# Patient Record
Sex: FEMALE | Race: BLACK OR AFRICAN AMERICAN | NOT HISPANIC OR LATINO | Employment: FULL TIME | URBAN - METROPOLITAN AREA
[De-identification: names, ages, dates, MRNs, and addresses within clinical notes are randomized per-mention and may not be internally consistent; named-entity substitution may affect disease eponyms.]

---

## 2024-03-02 ENCOUNTER — HOSPITAL ENCOUNTER (EMERGENCY)
Facility: HOSPITAL | Age: 39
Discharge: HOME | End: 2024-03-02
Attending: EMERGENCY MEDICINE
Payer: OTHER GOVERNMENT

## 2024-03-02 VITALS
RESPIRATION RATE: 18 BRPM | HEIGHT: 69 IN | WEIGHT: 211.63 LBS | OXYGEN SATURATION: 100 % | BODY MASS INDEX: 31.34 KG/M2 | SYSTOLIC BLOOD PRESSURE: 114 MMHG | DIASTOLIC BLOOD PRESSURE: 77 MMHG | TEMPERATURE: 98 F | HEART RATE: 86 BPM

## 2024-03-02 DIAGNOSIS — J03.90 TONSILLITIS: Primary | ICD-10-CM

## 2024-03-02 LAB
ANION GAP SERPL CALC-SCNC: 8 MMOL/L (ref 8–16)
BASOPHILS # BLD AUTO: 0.04 K/UL (ref 0–0.2)
BASOPHILS NFR BLD: 0.3 % (ref 0–1.9)
BUN SERPL-MCNC: 12 MG/DL (ref 6–20)
CALCIUM SERPL-MCNC: 9.4 MG/DL (ref 8.7–10.5)
CHLORIDE SERPL-SCNC: 103 MMOL/L (ref 95–110)
CO2 SERPL-SCNC: 26 MMOL/L (ref 23–29)
CREAT SERPL-MCNC: 1 MG/DL (ref 0.5–1.4)
DIFFERENTIAL METHOD BLD: ABNORMAL
EOSINOPHIL # BLD AUTO: 0.1 K/UL (ref 0–0.5)
EOSINOPHIL NFR BLD: 0.5 % (ref 0–8)
ERYTHROCYTE [DISTWIDTH] IN BLOOD BY AUTOMATED COUNT: 14.2 % (ref 11.5–14.5)
EST. GFR  (NO RACE VARIABLE): >60 ML/MIN/1.73 M^2
GLUCOSE SERPL-MCNC: 91 MG/DL (ref 70–110)
HCT VFR BLD AUTO: 32 % (ref 37–48.5)
HGB BLD-MCNC: 10.1 G/DL (ref 12–16)
IMM GRANULOCYTES # BLD AUTO: 0.06 K/UL (ref 0–0.04)
IMM GRANULOCYTES NFR BLD AUTO: 0.4 % (ref 0–0.5)
LYMPHOCYTES # BLD AUTO: 3.5 K/UL (ref 1–4.8)
LYMPHOCYTES NFR BLD: 23.5 % (ref 18–48)
MCH RBC QN AUTO: 25.2 PG (ref 27–31)
MCHC RBC AUTO-ENTMCNC: 31.6 G/DL (ref 32–36)
MCV RBC AUTO: 80 FL (ref 82–98)
MONOCYTES # BLD AUTO: 0.8 K/UL (ref 0.3–1)
MONOCYTES NFR BLD: 5.2 % (ref 4–15)
NEUTROPHILS # BLD AUTO: 10.3 K/UL (ref 1.8–7.7)
NEUTROPHILS NFR BLD: 70.1 % (ref 38–73)
NRBC BLD-RTO: 0 /100 WBC
PLATELET # BLD AUTO: 422 K/UL (ref 150–450)
PMV BLD AUTO: 8.7 FL (ref 9.2–12.9)
POTASSIUM SERPL-SCNC: 3.6 MMOL/L (ref 3.5–5.1)
RBC # BLD AUTO: 4.01 M/UL (ref 4–5.4)
SODIUM SERPL-SCNC: 137 MMOL/L (ref 136–145)
WBC # BLD AUTO: 14.67 K/UL (ref 3.9–12.7)

## 2024-03-02 PROCEDURE — 63600175 PHARM REV CODE 636 W HCPCS: Mod: JZ,JG | Performed by: EMERGENCY MEDICINE

## 2024-03-02 PROCEDURE — 96375 TX/PRO/DX INJ NEW DRUG ADDON: CPT

## 2024-03-02 PROCEDURE — 85025 COMPLETE CBC W/AUTO DIFF WBC: CPT | Performed by: EMERGENCY MEDICINE

## 2024-03-02 PROCEDURE — 80048 BASIC METABOLIC PNL TOTAL CA: CPT | Performed by: EMERGENCY MEDICINE

## 2024-03-02 PROCEDURE — 25500020 PHARM REV CODE 255: Performed by: EMERGENCY MEDICINE

## 2024-03-02 PROCEDURE — 96365 THER/PROPH/DIAG IV INF INIT: CPT | Mod: 59

## 2024-03-02 PROCEDURE — 25000003 PHARM REV CODE 250: Performed by: EMERGENCY MEDICINE

## 2024-03-02 PROCEDURE — 99285 EMERGENCY DEPT VISIT HI MDM: CPT | Mod: 25

## 2024-03-02 RX ORDER — PREDNISONE 20 MG/1
40 TABLET ORAL DAILY
Qty: 10 TABLET | Refills: 0 | Status: SHIPPED | OUTPATIENT
Start: 2024-03-02 | End: 2024-03-07

## 2024-03-02 RX ORDER — PREDNISONE 20 MG/1
40 TABLET ORAL DAILY
Qty: 10 TABLET | Refills: 0 | Status: SHIPPED | OUTPATIENT
Start: 2024-03-02 | End: 2024-03-02

## 2024-03-02 RX ORDER — MINERAL OIL
180 ENEMA (ML) RECTAL
COMMUNITY
Start: 2024-01-24

## 2024-03-02 RX ORDER — DEXAMETHASONE SODIUM PHOSPHATE 4 MG/ML
12 INJECTION, SOLUTION INTRA-ARTICULAR; INTRALESIONAL; INTRAMUSCULAR; INTRAVENOUS; SOFT TISSUE
Status: COMPLETED | OUTPATIENT
Start: 2024-03-02 | End: 2024-03-02

## 2024-03-02 RX ORDER — AMOXICILLIN AND CLAVULANATE POTASSIUM 875; 125 MG/1; MG/1
TABLET, FILM COATED ORAL
Status: ON HOLD | COMMUNITY
Start: 2024-02-21 | End: 2024-04-17 | Stop reason: HOSPADM

## 2024-03-02 RX ORDER — CLINDAMYCIN PHOSPHATE 900 MG/50ML
900 INJECTION, SOLUTION INTRAVENOUS
Status: COMPLETED | OUTPATIENT
Start: 2024-03-02 | End: 2024-03-02

## 2024-03-02 RX ORDER — FLUTICASONE PROPIONATE 50 MCG
SPRAY, SUSPENSION (ML) NASAL
COMMUNITY
Start: 2024-01-24

## 2024-03-02 RX ORDER — ALBUTEROL SULFATE 90 UG/1
AEROSOL, METERED RESPIRATORY (INHALATION)
COMMUNITY
Start: 2024-01-24

## 2024-03-02 RX ADMIN — DEXAMETHASONE SODIUM PHOSPHATE 12 MG: 4 INJECTION INTRA-ARTICULAR; INTRALESIONAL; INTRAMUSCULAR; INTRAVENOUS; SOFT TISSUE at 10:03

## 2024-03-02 RX ADMIN — CLINDAMYCIN PHOSPHATE 900 MG: 900 INJECTION, SOLUTION INTRAVENOUS at 10:03

## 2024-03-02 RX ADMIN — SODIUM CHLORIDE 1000 ML: 9 INJECTION, SOLUTION INTRAVENOUS at 11:03

## 2024-03-02 RX ADMIN — IOHEXOL 100 ML: 350 INJECTION, SOLUTION INTRAVENOUS at 11:03

## 2024-03-02 NOTE — ED PROVIDER NOTES
SCRIBE #1 NOTE: I, Ata Brady, am scribing for, and in the presence of, Kay Hernandez MD. I have scribed the entire note.       History     Chief Complaint   Patient presents with    Abscess     Pt went to urgent care and told she may have a left  jayesh-tonsillar abscess. Tested negative for strep.     Review of patient's allergies indicates:  No Known Allergies      History of Present Illness     HPI    3/2/2024, 10:14 AM  History obtained from the patient      History of Present Illness: Suri Hameed is a 38 y.o. female patient with a PMHx of asthma who presents to the Emergency Department for evaluation of a possible abscess which onset gradually within the past couple weeks. Pt was sent to ED by [facility] for concerns of a peritonsil abscess. Pt went to [facility] last week and tested negative for COVID and Flu. Pt also went to [facility] in [place] last week and tested negative for strep. Pt received amoxicillin from [facility] last week as a precaution, which improved the pain and swelling initially. Pt's sxs were improved Saturday of last week, but it came back yesterday. Symptoms are constant and moderate in severity. Pain worsens with swallowing and jaw movement. Pt has not been able to eat as much and has been drinking only water and juice. No associated sxs. Patient denies any fever, weakness, SOB, CP, and all other sxs at this time. No prior Tx. No further complaints or concerns at this time.       Arrival mode: Personal vehicle    PCP: No, Primary Doctor        Past Medical History:  Past Medical History:   Diagnosis Date    Mild intermittent asthma, uncomplicated        Past Surgical History:  No past surgical history on file.      Family History:  No family history on file.    Social History:  Social History     Tobacco Use    Smoking status: Not on file    Smokeless tobacco: Not on file   Substance and Sexual Activity    Alcohol use: Not on file    Drug use: Not on file    Sexual activity: Not on  file        Review of Systems     Review of Systems   Constitutional:  Positive for appetite change (decreased due to pain). Negative for chills and fever.   HENT:  Positive for sore throat. Negative for congestion.    Respiratory:  Negative for cough and shortness of breath.    Cardiovascular:  Negative for chest pain.   Gastrointestinal:  Negative for abdominal pain, nausea and vomiting.   Genitourinary:  Negative for dysuria.   Musculoskeletal:  Negative for back pain.   Skin:  Negative for rash.   Neurological:  Negative for dizziness, weakness, light-headedness and headaches.   Hematological:  Does not bruise/bleed easily.   All other systems reviewed and are negative.       Physical Exam     Initial Vitals [03/02/24 0948]   BP Pulse Resp Temp SpO2   (!) 165/69 95 16 98 °F (36.7 °C) 100 %      MAP       --          Physical Exam  Nursing Notes and Vital Signs Reviewed.  Constitutional: Patient is in no acute distress. Well-developed and well-nourished.  Head: Atraumatic. Normocephalic.  Eyes: PERRL. EOM intact. Conjunctivae are not pale. No scleral icterus.  ENT: Mucous membranes are moist. Airways patent. No drooling or stridor. Hoarse voice. Swelling and exudate to left tonsil. Uvala shifted to right.  Neck: Supple. Full ROM. No lymphadenopathy.  Cardiovascular: Regular rate. Regular rhythm. No murmurs, rubs, or gallops. Distal pulses are 2+ and symmetric.  Pulmonary/Chest: No respiratory distress. Clear to auscultation bilaterally. No wheezing or rales.  Abdominal: Soft and non-distended.  There is no tenderness.  No rebound, guarding, or rigidity. Good bowel sounds.  Genitourinary: No CVA tenderness  Musculoskeletal: Moves all extremities. No obvious deformities. No edema. No calf tenderness.  Skin: Warm and dry.  Neurological:  Alert, awake, and appropriate.  Normal speech.  No acute focal neurological deficits are appreciated.  Psychiatric: Normal affect. Good eye contact. Appropriate in content.     ED  "Course   Procedures  ED Vital Signs:  Vitals:    03/02/24 0948 03/02/24 1015 03/02/24 1100 03/02/24 1200   BP: (!) 165/69 128/65 120/66 114/77   Pulse: 95 91 86 86   Resp: 16 17 18 18   Temp: 98 °F (36.7 °C)   97.8 °F (36.6 °C)   TempSrc: Oral   Oral   SpO2: 100% 100% 100% 100%   Weight: 96 kg (211 lb 10.3 oz)      Height: 5' 9" (1.753 m)          Abnormal Lab Results:  Labs Reviewed   CBC W/ AUTO DIFFERENTIAL - Abnormal; Notable for the following components:       Result Value    WBC 14.67 (*)     Hemoglobin 10.1 (*)     Hematocrit 32.0 (*)     MCV 80 (*)     MCH 25.2 (*)     MCHC 31.6 (*)     MPV 8.7 (*)     Gran # (ANC) 10.3 (*)     Immature Grans (Abs) 0.06 (*)     All other components within normal limits   BASIC METABOLIC PANEL   PREGNANCY TEST, URINE RAPID        All Lab Results:  Results for orders placed or performed during the hospital encounter of 03/02/24   CBC auto differential   Result Value Ref Range    WBC 14.67 (H) 3.90 - 12.70 K/uL    RBC 4.01 4.00 - 5.40 M/uL    Hemoglobin 10.1 (L) 12.0 - 16.0 g/dL    Hematocrit 32.0 (L) 37.0 - 48.5 %    MCV 80 (L) 82 - 98 fL    MCH 25.2 (L) 27.0 - 31.0 pg    MCHC 31.6 (L) 32.0 - 36.0 g/dL    RDW 14.2 11.5 - 14.5 %    Platelets 422 150 - 450 K/uL    MPV 8.7 (L) 9.2 - 12.9 fL    Immature Granulocytes 0.4 0.0 - 0.5 %    Gran # (ANC) 10.3 (H) 1.8 - 7.7 K/uL    Immature Grans (Abs) 0.06 (H) 0.00 - 0.04 K/uL    Lymph # 3.5 1.0 - 4.8 K/uL    Mono # 0.8 0.3 - 1.0 K/uL    Eos # 0.1 0.0 - 0.5 K/uL    Baso # 0.04 0.00 - 0.20 K/uL    nRBC 0 0 /100 WBC    Gran % 70.1 38.0 - 73.0 %    Lymph % 23.5 18.0 - 48.0 %    Mono % 5.2 4.0 - 15.0 %    Eosinophil % 0.5 0.0 - 8.0 %    Basophil % 0.3 0.0 - 1.9 %    Differential Method Automated    Basic metabolic panel   Result Value Ref Range    Sodium 137 136 - 145 mmol/L    Potassium 3.6 3.5 - 5.1 mmol/L    Chloride 103 95 - 110 mmol/L    CO2 26 23 - 29 mmol/L    Glucose 91 70 - 110 mg/dL    BUN 12 6 - 20 mg/dL    Creatinine 1.0 0.5 - " 1.4 mg/dL    Calcium 9.4 8.7 - 10.5 mg/dL    Anion Gap 8 8 - 16 mmol/L    eGFR >60 >60 mL/min/1.73 m^2         Imaging Results:  Imaging Results              CT Soft Tissue Neck With Contrast (Final result)  Result time 03/02/24 11:51:32      Final result by Uli Butler MD (03/02/24 11:51:32)                   Impression:      Adenoid and tonsillar enlargement with prominent enlargement of the left palatine tonsil consistent with acute tonsillitis.  No evidence of acute abscess at this time.      Electronically signed by: Uli Butler MD  Date:    03/02/2024  Time:    11:51               Narrative:    EXAMINATION:  CT SOFT TISSUE NECK WITH CONTRAST    CLINICAL HISTORY:  Tonsillitis    TECHNIQUE:  Contrast enhanced CT scan of the neck soft tissues performed with 100 mL Omnipaque 350.    All CT scans at this facility are performed  using dose modulation techniques as appropriate to performed exam including the following:  automated exposure control; adjustment of mA and/or kV according to the patients size (this includes techniques or standardized protocols for targeted exams where dose is matched to indication/reason for exam: i.e. extremities or head);  iterative reconstruction technique.    COMPARISON:  None    FINDINGS:  The adenoids are enlarged.    The palatine tonsils are enlarged with the left tonsil much larger than the right.  While there is heterogeneous enhancement there is no definite abscess at this time.  There is parapharyngeal edema.  There is mild left hypopharyngeal mucosal space edema.    There are reactive cervical lymph nodes greater on the left than right    The thyroid gland appears normal.    The larynx appears normal.    Lung apices appear clear.    Visualized sinus cavities are clear.                                           The Emergency Provider reviewed the vital signs and test results, which are outlined above.     ED Discussion       11:56 AM: Reassessed pt at this time.   Discussed with pt all pertinent ED information and results. Discussed pt dx and plan of tx. Gave pt all f/u and return to the ED instructions. All questions and concerns were addressed at this time. Pt expresses understanding of information and instructions, and is comfortable with plan to discharge. Pt is stable for discharge.    I discussed with patient and/or family/caretaker that evaluation in the ED does not suggest any emergent or life threatening medical conditions requiring immediate intervention beyond what was provided in the ED, and I believe patient is safe for discharge.  Regardless, an unremarkable evaluation in the ED does not preclude the development or presence of a serious of life threatening condition. As such, patient was instructed to return immediately for any worsening or change in current symptoms.        Medical Decision Making  DDX:  1. PTA  2. Tonsillitis  3. Pharyngitis    Lab work reviewed and otherwise normal, enlarged tonsil on the left, mild uvula deviation, airway patent, tolerating po, already on Augmentin, given iv steroids and antibiotics, CT scan reviewed and no evidence of PTA, imaging consistent with acute tonsillitis, no airway obstruction, mild pharyngeal soft tissue swelling noted, overall patient non-toxic appearing, tolerating po, given steroids, feel that she is safe for discharge home, cont with augmentin and oral steroids, ENT referral placed and reason to return given.     Amount and/or Complexity of Data Reviewed  Labs: ordered. Decision-making details documented in ED Course.  Radiology: ordered. Decision-making details documented in ED Course.    Risk  Prescription drug management.  Decision regarding hospitalization.                ED Medication(s):  Medications   dexAMETHasone injection 12 mg (12 mg Intravenous Given 3/2/24 1031)   clindamycin in D5W 900 mg/50 mL IVPB 900 mg (0 mg Intravenous Stopped 3/2/24 1204)   sodium chloride 0.9% bolus 1,000 mL 1,000 mL (0  mLs Intravenous Stopped 3/2/24 1210)   iohexoL (OMNIPAQUE 350) injection 100 mL (100 mLs Intravenous Given 3/2/24 1128)       Discharge Medication List as of 3/2/2024 11:56 AM        START taking these medications    Details   predniSONE (DELTASONE) 20 MG tablet Take 2 tablets (40 mg total) by mouth once daily. for 5 days, Starting Sat 3/2/2024, Until Thu 3/7/2024, Normal              Follow-up Information       Otolaryngology. Schedule an appointment as soon as possible for a visit in 2 days.    Specialty: Otolaryngology  Why: Return to the Emergency Room, If symptoms worsen  Contact information:  [address] [place] [id number] [phone]                               Scribe Attestation:   Scribe #1: I performed the above scribed service and the documentation accurately describes the services I performed. I attest to the accuracy of the note.     Attending:   Physician Attestation Statement for Scribe #1: I, Kay Hernandez MD, personally performed the services described in this documentation, as scribed by Ata Brady, in my presence, and it is both accurate and complete.           Clinical Impression       ICD-10-CM ICD-9-CM   1. Tonsillitis  J03.90 463       Disposition:   Disposition: Discharged  Condition: Stable        Kay Hernandez MD  03/02/24 1219

## 2024-03-22 ENCOUNTER — HOSPITAL ENCOUNTER (EMERGENCY)
Facility: HOSPITAL | Age: 39
Discharge: HOME | End: 2024-03-22
Attending: EMERGENCY MEDICINE
Payer: OTHER GOVERNMENT

## 2024-03-22 VITALS
WEIGHT: 212.31 LBS | BODY MASS INDEX: 31.35 KG/M2 | DIASTOLIC BLOOD PRESSURE: 84 MMHG | RESPIRATION RATE: 18 BRPM | HEART RATE: 90 BPM | OXYGEN SATURATION: 100 % | SYSTOLIC BLOOD PRESSURE: 137 MMHG | TEMPERATURE: 98 F

## 2024-03-22 DIAGNOSIS — H93.8X3 EAR FULLNESS, BILATERAL: Primary | ICD-10-CM

## 2024-03-22 PROCEDURE — 99283 EMERGENCY DEPT VISIT LOW MDM: CPT

## 2024-03-22 RX ORDER — METHYLPREDNISOLONE 4 MG/1
TABLET ORAL
Qty: 1 EACH | Refills: 0 | Status: ON HOLD | OUTPATIENT
Start: 2024-03-22 | End: 2024-04-17 | Stop reason: HOSPADM

## 2024-03-23 NOTE — ED PROVIDER NOTES
Encounter Date: 3/22/2024       History     Chief Complaint   Patient presents with    Ear Fullness     Was being treated for tonsillitis but now the right ear feels very full and is unrelieved by OTC meds.     Patient is a 38-year-old female that presents with bilateral ear fullness but states it is worse on the right than the left.  Symptoms have been ongoing after tonsillitis diagnosis.  Patient has been dealing with tonsillitis for the last couple of months.  Has an appointment with an ENT on Monday.  Denies any fever, cough, nausea and vomiting.  Patient shows no signs of distress at this time.      Review of patient's allergies indicates:  No Known Allergies  Past Medical History:   Diagnosis Date    Mild intermittent asthma, uncomplicated      No past surgical history on file.  No family history on file.     Review of Systems   Constitutional:  Negative for fever.   HENT:  Positive for ear pain (bilateral). Negative for sore throat.    Respiratory:  Negative for shortness of breath.    Cardiovascular:  Negative for chest pain.   Gastrointestinal:  Negative for nausea.   Genitourinary:  Negative for dysuria.   Musculoskeletal:  Negative for back pain.   Skin:  Negative for rash.   Neurological:  Negative for weakness.   Hematological:  Does not bruise/bleed easily.       Physical Exam     Initial Vitals   BP Pulse Resp Temp SpO2   03/22/24 2026 03/22/24 2026 03/22/24 2026 03/22/24 2027 03/22/24 2026   137/84 90 18 98.1 °F (36.7 °C) 100 %      MAP       --                Physical Exam    Nursing note and vitals reviewed.  Constitutional: She appears well-developed and well-nourished.   HENT:   Head: Normocephalic and atraumatic.   Right Ear: No tenderness. No foreign bodies. No mastoid tenderness. Tympanic membrane is bulging. Tympanic membrane is not perforated, not erythematous and not retracted.   Left Ear: No tenderness. No foreign bodies. No mastoid tenderness. Tympanic membrane is bulging. Tympanic  membrane is not perforated, not erythematous and not retracted.   Eyes: EOM are normal. Pupils are equal, round, and reactive to light.   Neck: Neck supple.   Normal range of motion.  Cardiovascular:  Normal rate, regular rhythm, normal heart sounds and intact distal pulses.           Pulmonary/Chest: Breath sounds normal.   Abdominal: Abdomen is soft. Bowel sounds are normal.   Musculoskeletal:         General: Normal range of motion.      Cervical back: Normal range of motion and neck supple.     Neurological: She is alert and oriented to person, place, and time. She has normal strength and normal reflexes.   Skin: Skin is warm and dry.         ED Course   Procedures  Labs Reviewed - No data to display       Imaging Results    None          Medications - No data to display  Medical Decision Making  I discussed with patient and/or family/caretaker that evaluation in the ED does not suggest any emergent or life threatening medical conditions requiring immediate intervention beyond what was provided in the ED, and I believe patient is safe for discharge. Regardless, an unremarkable evaluation in the ED does not preclude the development or presence of a serious of life threatening condition. As such, patient was instructed to return immediately for any worsening or change in current symptoms.                                        Clinical Impression:  Final diagnoses:  [H93.8X3] Ear fullness, bilateral (Primary)          ED Disposition Condition    Discharge Stable          ED Prescriptions       Medication Sig Dispense Start Date End Date Auth. Provider    methylPREDNISolone (MEDROL DOSEPACK) 4 mg tablet Take per package directions 1 each 3/22/2024 -- Mainor Corbin NP          Follow-up Information       Follow up With Specialties Details Why Contact [facility]   As needed [address] [facility] [id number] [phone]               Mainor Corbin NP  03/22/24 2031

## 2024-03-25 ENCOUNTER — OFFICE VISIT (OUTPATIENT)
Dept: OTOLARYNGOLOGY | Facility: CLINIC | Age: 39
End: 2024-03-25
Payer: OTHER GOVERNMENT

## 2024-03-25 VITALS — HEIGHT: 69 IN | BODY MASS INDEX: 31.35 KG/M2 | TEMPERATURE: 98 F

## 2024-03-25 DIAGNOSIS — J35.1 TONSILLAR HYPERTROPHY: ICD-10-CM

## 2024-03-25 DIAGNOSIS — J35.01 CHRONIC TONSILLITIS: ICD-10-CM

## 2024-03-25 DIAGNOSIS — J03.90 TONSILLITIS: ICD-10-CM

## 2024-03-25 DIAGNOSIS — H65.93 OME (OTITIS MEDIA WITH EFFUSION), BILATERAL: Primary | ICD-10-CM

## 2024-03-25 PROCEDURE — 99999 PR PBB SHADOW E&M-EST. PATIENT-LVL IV: CPT | Mod: PBBFAC,,, | Performed by: STUDENT IN AN ORGANIZED HEALTH CARE EDUCATION/TRAINING PROGRAM

## 2024-03-25 PROCEDURE — 99214 OFFICE O/P EST MOD 30 MIN: CPT | Mod: PBBFAC | Performed by: STUDENT IN AN ORGANIZED HEALTH CARE EDUCATION/TRAINING PROGRAM

## 2024-03-25 PROCEDURE — 99204 OFFICE O/P NEW MOD 45 MIN: CPT | Mod: S$PBB,,, | Performed by: STUDENT IN AN ORGANIZED HEALTH CARE EDUCATION/TRAINING PROGRAM

## 2024-03-25 NOTE — PROGRESS NOTES
"Chief complaint:    Chief Complaint   Patient presents with    Sore Throat     Patient has had tonsillitis 3 times since December. She c/o fluid in ears.           Referring Provider:  Kay Hernandez Md  [address] [place] [address] [place] [address]      History of present illness:     Ms. Hameed is a 38 y.o. presenting for evaluation of chronic tonsillitis.     The patient reports tonsil symptoms including: sore throats, swollen glands, snoring, mouthbreathing, difficulty swallowing certain foods. Also with R>L ear fullness, muffled hearing over last few days.  Symptoms have been present for 4-5 months after a severe tonsillitis that has not resolved despite medical  Severity of episodes: severe, 2x trips to ED  Treatment has included: augmentin and most recently medrol dosepack x 2  The patient denies voice change, otalgia, unintentional weight loss or neck mass.      History      Past Medical History:   Past Medical History:   Diagnosis Date    Mild intermittent asthma, uncomplicated          Past Surgical History:No past surgical history on file.      Medications: Medication list reviewed. She  has a current medication list which includes the following prescription(s): albuterol, amoxicillin-clavulanate 875-125mg, fexofenadine, fluticasone propionate, and methylprednisolone.     Allergies: Review of patient's allergies indicates:  No Known Allergies      Family history: family history is not on file.         Social History          Alcohol use:  has no history on file for alcohol use.            Tobacco:  reports that she has never smoked. She does not have any smokeless tobacco history on file.         Physical Examination      Vitals: Temperature 97.9 °F (36.6 °C), temperature source Temporal, height 5' 9" (1.753 m).      General: Well developed, well nourished, well hydrated.     Voice: no dysphonia, no dysarthria      Head/Face: Normocephalic, atraumatic. No scars or lesions. Facial musculature equal. "     Eyes: No scleral icterus or conjunctival hemorrhage. EOMI. PERRLA.     Ears:     Right ear: No gross deformity. EAC is clear of debris and erythema. TM are intact with a partial clear effusion    Left ear: No gross deformity. EAC is clear of debris and erythema. TM are intact with a partial clear effusion    Nose: No gross deformity or lesions. No purulent discharge. No significant NSD.      Mouth/Oropharynx: Lips without any lesions. No mucosal lesions within the oropharynx. Pharyngeal walls symmetrical. Uvula midline. Tongue midline without lesions. Tonsils are 3+++ and symmetric with normal appearance. Tonsilliths are not present.    Neck: Trachea midline. No masses. No thyromegaly or nodules palpated.     Lymphatic: No lymphadenopathy in the neck.     Extremities: No cyanosis. Warm and well-perfused.     Skin: No scars or lesions on face or neck.      Neurologic: Moving all extremities without gross abnormality.CN II-XII grossly intact. House-Brackmann 1/6. No signs of nystagmus.        Data reviewed      Review of records:      I reviewed records from the referring provider's office visits including the history, workup, and/or treatment of this problem thus far.      Imaging:      I have independently reviewed the following imaging with the findings noted below:     CT neck 3/2/24  Markedly enlarged bilateral palatine tonsils        Assessment/Plan:    1. OME (otitis media with effusion), bilateral    2. Tonsillitis    3. Chronic tonsillitis    4. Tonsillar hypertrophy         Suri has chronic, severe tonsillitis.  We discussed conservative measures during infections vs proceeding with tonsillectomy  We had a lengthy discussion of the options for treatment today and I recommend tonsillectomy.  The risks and benefits of surgery were discussed at length including, but not limited to, respiratory distress, hemorrhage, regurgitation of food/liquids into the nasopharynx, voice changes, dehydration and pain.   The patient had the opportunity to ask questions to their satisfaction.         Otitis Media with Effusion, bilateral  New since chronic tonsillitis started  Finish steroids, start flonase  Consider tubes if not improved after tonsillectomy      Juan M Cevallos MD  [facility]   [facility]  [id number] [place] [address].  [place] Moreau [address]  P: [phone]  F: [phone]

## 2024-03-25 NOTE — H&P (VIEW-ONLY)
"Chief complaint:    Chief Complaint   Patient presents with    Sore Throat     Patient has had tonsillitis 3 times since December. She c/o fluid in ears.           Referring Provider:  Kay Hernandez Md  [address] [place] [address] [place] [address]      History of present illness:     Ms. Hameed is a 38 y.o. presenting for evaluation of chronic tonsillitis.     The patient reports tonsil symptoms including: sore throats, swollen glands, snoring, mouthbreathing, difficulty swallowing certain foods. Also with R>L ear fullness, muffled hearing over last few days.  Symptoms have been present for 4-5 months after a severe tonsillitis that has not resolved despite medical  Severity of episodes: severe, 2x trips to ED  Treatment has included: augmentin and most recently medrol dosepack x 2  The patient denies voice change, otalgia, unintentional weight loss or neck mass.      History      Past Medical History:   Past Medical History:   Diagnosis Date    Mild intermittent asthma, uncomplicated          Past Surgical History:No past surgical history on file.      Medications: Medication list reviewed. She  has a current medication list which includes the following prescription(s): albuterol, amoxicillin-clavulanate 875-125mg, fexofenadine, fluticasone propionate, and methylprednisolone.     Allergies: Review of patient's allergies indicates:  No Known Allergies      Family history: family history is not on file.         Social History          Alcohol use:  has no history on file for alcohol use.            Tobacco:  reports that she has never smoked. She does not have any smokeless tobacco history on file.         Physical Examination      Vitals: Temperature 97.9 °F (36.6 °C), temperature source Temporal, height 5' 9" (1.753 m).      General: Well developed, well nourished, well hydrated.     Voice: no dysphonia, no dysarthria      Head/Face: Normocephalic, atraumatic. No scars or lesions. Facial musculature equal. "     Eyes: No scleral icterus or conjunctival hemorrhage. EOMI. PERRLA.     Ears:     Right ear: No gross deformity. EAC is clear of debris and erythema. TM are intact with a partial clear effusion    Left ear: No gross deformity. EAC is clear of debris and erythema. TM are intact with a partial clear effusion    Nose: No gross deformity or lesions. No purulent discharge. No significant NSD.      Mouth/Oropharynx: Lips without any lesions. No mucosal lesions within the oropharynx. Pharyngeal walls symmetrical. Uvula midline. Tongue midline without lesions. Tonsils are 3+++ and symmetric with normal appearance. Tonsilliths are not present.    Neck: Trachea midline. No masses. No thyromegaly or nodules palpated.     Lymphatic: No lymphadenopathy in the neck.     Extremities: No cyanosis. Warm and well-perfused.     Skin: No scars or lesions on face or neck.      Neurologic: Moving all extremities without gross abnormality.CN II-XII grossly intact. House-Brackmann 1/6. No signs of nystagmus.        Data reviewed      Review of records:      I reviewed records from the referring provider's office visits including the history, workup, and/or treatment of this problem thus far.      Imaging:      I have independently reviewed the following imaging with the findings noted below:     CT neck 3/2/24  Markedly enlarged bilateral palatine tonsils        Assessment/Plan:    1. OME (otitis media with effusion), bilateral    2. Tonsillitis    3. Chronic tonsillitis    4. Tonsillar hypertrophy         Suri has chronic, severe tonsillitis.  We discussed conservative measures during infections vs proceeding with tonsillectomy  We had a lengthy discussion of the options for treatment today and I recommend tonsillectomy.  The risks and benefits of surgery were discussed at length including, but not limited to, respiratory distress, hemorrhage, regurgitation of food/liquids into the nasopharynx, voice changes, dehydration and pain.   The patient had the opportunity to ask questions to their satisfaction.         Otitis Media with Effusion, bilateral  New since chronic tonsillitis started  Finish steroids, start flonase  Consider tubes if not improved after tonsillectomy      Juan M Cevallos MD  [facility]   [facility]  [id number] [place] [address].  [place] [address]  P: [phone]  F: [phone]

## 2024-03-26 ENCOUNTER — ANESTHESIA EVENT (OUTPATIENT)
Dept: SURGERY | Facility: HOSPITAL | Age: 39
End: 2024-03-26
Payer: OTHER GOVERNMENT

## 2024-03-28 ENCOUNTER — PATIENT MESSAGE (OUTPATIENT)
Dept: PREADMISSION TESTING | Facility: HOSPITAL | Age: 39
End: 2024-03-28
Payer: OTHER GOVERNMENT

## 2024-04-01 ENCOUNTER — TELEPHONE (OUTPATIENT)
Dept: OTOLARYNGOLOGY | Facility: CLINIC | Age: 39
End: 2024-04-01
Payer: OTHER GOVERNMENT

## 2024-04-01 NOTE — TELEPHONE ENCOUNTER
Spoke to pt and informed that the acrylic nails do need to be removed in order to properly assess her oxygen level. Voiced understanding.

## 2024-04-01 NOTE — TELEPHONE ENCOUNTER
----- Message from Deandra Shukla sent at 4/1/2024  3:20 PM CDT -----  Contact: self  Pt is asking for an return call in reference to questions she has about an up coming apt,please call back at.[phone] Thx CJ

## 2024-04-04 ENCOUNTER — PATIENT MESSAGE (OUTPATIENT)
Dept: PREADMISSION TESTING | Facility: HOSPITAL | Age: 39
End: 2024-04-04
Payer: OTHER GOVERNMENT

## 2024-04-04 NOTE — ANESTHESIA PREPROCEDURE EVALUATION
04/04/2024  Suri Hameed is a 38 y.o., female.      Pre-op Assessment    I have reviewed the Patient Summary Reports.    I have reviewed the NPO Status.   I have reviewed the Medications.     Review of Systems  EENT/Dental:            Chronic Tonsillitis    Cardiovascular:  Cardiovascular Normal                                            Pulmonary:    Asthma    Sleep Apnea   Asthma:    Obstructive Sleep Apnea (RONNA).           Renal/:  Renal/ Normal                 Hepatic/GI:  Hepatic/GI Normal                     Physical Exam  General: Well nourished    Airway:  Mallampati: II   Mouth Opening: Normal  TM Distance: Normal  Tongue: Normal  Neck ROM: Normal ROM    Dental:  Intact        Anesthesia Plan  Type of Anesthesia, risks & benefits discussed:    Anesthesia Type: Gen ETT  Intra-op Monitoring Plan: Standard ASA Monitors  Post Op Pain Control Plan: multimodal analgesia and IV/PO Opioids PRN  Induction:  IV  Informed Consent: Informed consent signed with the Patient and all parties understand the risks and agree with anesthesia plan.  All questions answered. Patient consented to blood products? No  ASA Score: 2  Day of Surgery Review of History & Physical: H&P Update referred to the surgeon/provider.    Ready For Surgery From Anesthesia Perspective.     .

## 2024-04-05 ENCOUNTER — HOSPITAL ENCOUNTER (OUTPATIENT)
Facility: HOSPITAL | Age: 39
Discharge: HOME | End: 2024-04-05
Attending: STUDENT IN AN ORGANIZED HEALTH CARE EDUCATION/TRAINING PROGRAM | Admitting: STUDENT IN AN ORGANIZED HEALTH CARE EDUCATION/TRAINING PROGRAM
Payer: OTHER GOVERNMENT

## 2024-04-05 ENCOUNTER — ANESTHESIA (OUTPATIENT)
Dept: SURGERY | Facility: HOSPITAL | Age: 39
End: 2024-04-05
Payer: OTHER GOVERNMENT

## 2024-04-05 DIAGNOSIS — J35.1 TONSILLAR HYPERTROPHY: Primary | ICD-10-CM

## 2024-04-05 DIAGNOSIS — J35.01 CHRONIC TONSILLITIS: ICD-10-CM

## 2024-04-05 PROCEDURE — 25000003 PHARM REV CODE 250: Performed by: NURSE ANESTHETIST, CERTIFIED REGISTERED

## 2024-04-05 PROCEDURE — 71000033 HC RECOVERY, INTIAL HOUR: Performed by: STUDENT IN AN ORGANIZED HEALTH CARE EDUCATION/TRAINING PROGRAM

## 2024-04-05 PROCEDURE — 63600175 PHARM REV CODE 636 W HCPCS: Performed by: STUDENT IN AN ORGANIZED HEALTH CARE EDUCATION/TRAINING PROGRAM

## 2024-04-05 PROCEDURE — 36000707: Performed by: STUDENT IN AN ORGANIZED HEALTH CARE EDUCATION/TRAINING PROGRAM

## 2024-04-05 PROCEDURE — 88304 TISSUE EXAM BY PATHOLOGIST: CPT | Performed by: PATHOLOGY

## 2024-04-05 PROCEDURE — 63600175 PHARM REV CODE 636 W HCPCS: Performed by: NURSE ANESTHETIST, CERTIFIED REGISTERED

## 2024-04-05 PROCEDURE — 42826 REMOVAL OF TONSILS: CPT | Mod: ,,, | Performed by: STUDENT IN AN ORGANIZED HEALTH CARE EDUCATION/TRAINING PROGRAM

## 2024-04-05 PROCEDURE — 71000015 HC POSTOP RECOV 1ST HR: Performed by: STUDENT IN AN ORGANIZED HEALTH CARE EDUCATION/TRAINING PROGRAM

## 2024-04-05 PROCEDURE — 88304 TISSUE EXAM BY PATHOLOGIST: CPT | Mod: 26,,, | Performed by: PATHOLOGY

## 2024-04-05 PROCEDURE — D9220A PRA ANESTHESIA: Mod: ,,, | Performed by: NURSE ANESTHETIST, CERTIFIED REGISTERED

## 2024-04-05 PROCEDURE — 36000706: Performed by: STUDENT IN AN ORGANIZED HEALTH CARE EDUCATION/TRAINING PROGRAM

## 2024-04-05 PROCEDURE — 37000009 HC ANESTHESIA EA ADD 15 MINS: Performed by: STUDENT IN AN ORGANIZED HEALTH CARE EDUCATION/TRAINING PROGRAM

## 2024-04-05 PROCEDURE — 63600175 PHARM REV CODE 636 W HCPCS: Performed by: ANESTHESIOLOGY

## 2024-04-05 PROCEDURE — 37000008 HC ANESTHESIA 1ST 15 MINUTES: Performed by: STUDENT IN AN ORGANIZED HEALTH CARE EDUCATION/TRAINING PROGRAM

## 2024-04-05 RX ORDER — LIDOCAINE HYDROCHLORIDE 20 MG/ML
INJECTION, SOLUTION EPIDURAL; INFILTRATION; INTRACAUDAL; PERINEURAL
Status: DISCONTINUED | OUTPATIENT
Start: 2024-04-05 | End: 2024-04-05

## 2024-04-05 RX ORDER — DEXAMETHASONE 6 MG/1
TABLET ORAL
Qty: 4 TABLET | Refills: 0 | Status: ON HOLD | OUTPATIENT
Start: 2024-04-05 | End: 2024-04-17 | Stop reason: HOSPADM

## 2024-04-05 RX ORDER — ONDANSETRON HYDROCHLORIDE 2 MG/ML
INJECTION, SOLUTION INTRAVENOUS
Status: DISCONTINUED | OUTPATIENT
Start: 2024-04-05 | End: 2024-04-05

## 2024-04-05 RX ORDER — SUCCINYLCHOLINE CHLORIDE 20 MG/ML
INJECTION INTRAMUSCULAR; INTRAVENOUS
Status: DISCONTINUED | OUTPATIENT
Start: 2024-04-05 | End: 2024-04-05

## 2024-04-05 RX ORDER — DEXMEDETOMIDINE HYDROCHLORIDE 100 UG/ML
INJECTION, SOLUTION INTRAVENOUS
Status: DISCONTINUED | OUTPATIENT
Start: 2024-04-05 | End: 2024-04-05

## 2024-04-05 RX ORDER — SODIUM CHLORIDE, SODIUM LACTATE, POTASSIUM CHLORIDE, CALCIUM CHLORIDE 600; 310; 30; 20 MG/100ML; MG/100ML; MG/100ML; MG/100ML
INJECTION, SOLUTION INTRAVENOUS CONTINUOUS
Status: DISCONTINUED | OUTPATIENT
Start: 2024-04-05 | End: 2024-04-05 | Stop reason: HOSPADM

## 2024-04-05 RX ORDER — FENTANYL CITRATE 50 UG/ML
INJECTION, SOLUTION INTRAMUSCULAR; INTRAVENOUS
Status: DISCONTINUED | OUTPATIENT
Start: 2024-04-05 | End: 2024-04-05

## 2024-04-05 RX ORDER — OXYCODONE HYDROCHLORIDE 5 MG/1
5 TABLET ORAL EVERY 4 HOURS PRN
Qty: 40 TABLET | Refills: 0 | Status: ON HOLD | OUTPATIENT
Start: 2024-04-05 | End: 2024-04-17 | Stop reason: HOSPADM

## 2024-04-05 RX ORDER — MEPERIDINE HYDROCHLORIDE 25 MG/ML
12.5 INJECTION INTRAMUSCULAR; INTRAVENOUS; SUBCUTANEOUS ONCE AS NEEDED
Status: DISCONTINUED | OUTPATIENT
Start: 2024-04-05 | End: 2024-04-05 | Stop reason: HOSPADM

## 2024-04-05 RX ORDER — PROPOFOL 10 MG/ML
VIAL (ML) INTRAVENOUS
Status: DISCONTINUED | OUTPATIENT
Start: 2024-04-05 | End: 2024-04-05

## 2024-04-05 RX ORDER — FENTANYL CITRATE 50 UG/ML
25 INJECTION, SOLUTION INTRAMUSCULAR; INTRAVENOUS EVERY 5 MIN PRN
Status: DISCONTINUED | OUTPATIENT
Start: 2024-04-05 | End: 2024-04-05 | Stop reason: HOSPADM

## 2024-04-05 RX ORDER — OXYCODONE AND ACETAMINOPHEN 5; 325 MG/1; MG/1
1 TABLET ORAL
Status: DISCONTINUED | OUTPATIENT
Start: 2024-04-05 | End: 2024-04-05 | Stop reason: HOSPADM

## 2024-04-05 RX ORDER — DEXAMETHASONE SODIUM PHOSPHATE 4 MG/ML
INJECTION, SOLUTION INTRA-ARTICULAR; INTRALESIONAL; INTRAMUSCULAR; INTRAVENOUS; SOFT TISSUE
Status: DISCONTINUED | OUTPATIENT
Start: 2024-04-05 | End: 2024-04-05

## 2024-04-05 RX ORDER — ROCURONIUM BROMIDE 10 MG/ML
INJECTION, SOLUTION INTRAVENOUS
Status: DISCONTINUED | OUTPATIENT
Start: 2024-04-05 | End: 2024-04-05

## 2024-04-05 RX ORDER — ONDANSETRON HYDROCHLORIDE 2 MG/ML
4 INJECTION, SOLUTION INTRAVENOUS DAILY PRN
Status: DISCONTINUED | OUTPATIENT
Start: 2024-04-05 | End: 2024-04-05 | Stop reason: HOSPADM

## 2024-04-05 RX ORDER — ACETAMINOPHEN 10 MG/ML
INJECTION, SOLUTION INTRAVENOUS
Status: DISCONTINUED | OUTPATIENT
Start: 2024-04-05 | End: 2024-04-05

## 2024-04-05 RX ORDER — KETOROLAC TROMETHAMINE 30 MG/ML
INJECTION, SOLUTION INTRAMUSCULAR; INTRAVENOUS
Status: DISCONTINUED | OUTPATIENT
Start: 2024-04-05 | End: 2024-04-05

## 2024-04-05 RX ORDER — MIDAZOLAM HYDROCHLORIDE 1 MG/ML
INJECTION INTRAMUSCULAR; INTRAVENOUS
Status: DISCONTINUED | OUTPATIENT
Start: 2024-04-05 | End: 2024-04-05

## 2024-04-05 RX ADMIN — FENTANYL CITRATE 25 MCG: 50 INJECTION, SOLUTION INTRAMUSCULAR; INTRAVENOUS at 12:04

## 2024-04-05 RX ADMIN — MIDAZOLAM HYDROCHLORIDE 2 MG: 1 INJECTION, SOLUTION INTRAMUSCULAR; INTRAVENOUS at 11:04

## 2024-04-05 RX ADMIN — KETOROLAC TROMETHAMINE 30 MG: 30 INJECTION, SOLUTION INTRAMUSCULAR; INTRAVENOUS at 12:04

## 2024-04-05 RX ADMIN — SUCCINYLCHOLINE CHLORIDE 120 MG: 20 INJECTION, SOLUTION INTRAMUSCULAR; INTRAVENOUS; PARENTERAL at 11:04

## 2024-04-05 RX ADMIN — SUGAMMADEX 200 MG: 100 INJECTION, SOLUTION INTRAVENOUS at 12:04

## 2024-04-05 RX ADMIN — ACETAMINOPHEN 1000 MG: 10 INJECTION, SOLUTION INTRAVENOUS at 11:04

## 2024-04-05 RX ADMIN — SODIUM CHLORIDE, SODIUM LACTATE, POTASSIUM CHLORIDE, AND CALCIUM CHLORIDE: 600; 310; 30; 20 INJECTION, SOLUTION INTRAVENOUS at 11:04

## 2024-04-05 RX ADMIN — ROCURONIUM BROMIDE 15 MG: 10 INJECTION, SOLUTION INTRAVENOUS at 11:04

## 2024-04-05 RX ADMIN — DEXAMETHASONE SODIUM PHOSPHATE 12 MG: 4 INJECTION, SOLUTION INTRA-ARTICULAR; INTRALESIONAL; INTRAMUSCULAR; INTRAVENOUS; SOFT TISSUE at 11:04

## 2024-04-05 RX ADMIN — SODIUM CHLORIDE, SODIUM LACTATE, POTASSIUM CHLORIDE, AND CALCIUM CHLORIDE: 600; 310; 30; 20 INJECTION, SOLUTION INTRAVENOUS at 12:04

## 2024-04-05 RX ADMIN — ONDANSETRON 4 MG: 2 INJECTION INTRAMUSCULAR; INTRAVENOUS at 12:04

## 2024-04-05 RX ADMIN — FENTANYL CITRATE 50 MCG: 50 INJECTION, SOLUTION INTRAMUSCULAR; INTRAVENOUS at 12:04

## 2024-04-05 RX ADMIN — FENTANYL CITRATE 100 MCG: 50 INJECTION, SOLUTION INTRAMUSCULAR; INTRAVENOUS at 11:04

## 2024-04-05 RX ADMIN — PROPOFOL 200 MG: 10 INJECTION, EMULSION INTRAVENOUS at 11:04

## 2024-04-05 RX ADMIN — LIDOCAINE HYDROCHLORIDE 70 MG: 20 INJECTION, SOLUTION EPIDURAL; INFILTRATION; INTRACAUDAL; PERINEURAL at 11:04

## 2024-04-05 RX ADMIN — ROCURONIUM BROMIDE 5 MG: 10 INJECTION, SOLUTION INTRAVENOUS at 11:04

## 2024-04-05 RX ADMIN — DEXMEDETOMIDINE HYDROCHLORIDE 4 MCG: 100 INJECTION, SOLUTION INTRAVENOUS at 12:04

## 2024-04-05 NOTE — BRIEF OP NOTE
[facility]  Brief Operative Note     SUMMARY     Surgery Date: 4/5/2024     Surgeon(s) and Role:     * Juan M Cevallos MD - Primary    Assisting Surgeon: None    Pre-op Diagnosis:  Chronic tonsillitis [J35.01]  Tonsillar hypertrophy [J35.1]    Post-op Diagnosis:  Post-Op Diagnosis Codes:     * Chronic tonsillitis [J35.01]     * Tonsillar hypertrophy [J35.1]    Procedure(s) (LRB):  TONSILLECTOMY (Bilateral)    Anesthesia: General    Findings/Key Components:  see op note    Estimated Blood Loss: 25 mL         Specimens:   Specimen (24h ago, onward)      None            Discharge Note    SUMMARY     Admit Date: 4/5/2024    Discharge Date and Time: No discharge date for patient encounter.    Attending Physician: Juan M Cevallos MD     Discharge Provider: Juan M Cevallos    Final Diagnosis: Post-Op Diagnosis Codes:     * Chronic tonsillitis [J35.01]     * Tonsillar hypertrophy [J35.1]    Disposition: Home or Self Care, discharged in good condition    Follow Up/Patient Instructions:       Medications:  Reconciled Home Medications:   Current Discharge Medication List        START taking these medications    Details   dexAMETHasone (DECADRON) 6 MG tablet Take one pill every other morning starting the day after surgery.  Qty: 4 tablet, Refills: 0      oxyCODONE (ROXICODONE) 5 MG immediate release tablet Take 1 tablet (5 mg total) by mouth every 4 (four) hours as needed for Pain.  Qty: 40 tablet, Refills: 0           CONTINUE these medications which have NOT CHANGED    Details   albuterol (PROVENTIL/VENTOLIN HFA) 90 mcg/actuation inhaler INHALE 2 PUFFS BY MOUTH FOUR TIMES A DAY AS NEEDED FOR ASTHMA ATTACK      amoxicillin-clavulanate 875-125mg (AUGMENTIN) 875-125 mg per tablet Take by mouth.      fexofenadine (ALLEGRA) 180 MG tablet 180 mg.      fluticasone propionate (FLONASE) 50 mcg/actuation nasal spray INSTILL 1 SPRAY IN EACH NOSTRIL EVERY DAY FOR ALLERGIES      methylPREDNISolone (MEDROL DOSEPACK) 4 mg  tablet Take per package directions  Qty: 1 each, Refills: 0           No discharge procedures on file.

## 2024-04-05 NOTE — OP NOTE
DATE OF PROCEDURE:  04/05/2024      PRE-OPERATIVE DIAGNOSIS:   Chronic tonsillitis  Tonsillar hypertrophy     POST-OPERATIVE DIAGNOSIS:   same      PROCEDURE:   tonsillectomy      SURGEON:   Juan M Cevallos MD     ANESTHESIA:   General      ESTIMATED BLOOD LOSS:   25 mL      SPECIMENS:   Left tonsil  Right tonsil       COMPLICATIONS:   None apparent     INDICATIONS:    Suri Hameed is a 38 y.o. female with  chronic tonsillitis and tonsillar hypertrophy who presents today for tonsillectomy. Risks, benefits, and expectations were thoroughly discussed and the patient/patient's family wished to proceed. Informed consent was obtained. All questions were answered.       OPERATIVE FINDINGS:   3+/4 palatine tonsils that were chronically inflamed       OPERATIVE DETAILS:   After being properly identified in the preoperative holding area, the patient was brought into the operating suite.  The patient was placed supine on the operating table.  A pre-procedural time-out was performed. Pre-operative antibiotics and steroids were administered. After induction of general anesthesia, the patient was successfully intubated with confirmation of tube placement via CO2 return.  The bed was then turned 90 degrees and the patient was prepped and draped in the usual fashion for this procedure.      The mouth was held open in suspension using a Zulma-Jose Guadalupe mouth gag and Erie Suspension Arm. The right tonsil was grasped using a curved Allis and removed in a capsular plane using the Bovie set on 20 coag, 20 cut. The left tonsil was removed in a similar fashion. Small areas of bleeding and visible blood vessels were coagulate with the suction bovie at coag 35. The operative field was then irrigated and meticulously checked for hemostasis. The Zulma-Jose Guadalupe was released and a flexible suction was used to remove stomach and oropharynx contents. The patient was resuspended and the tonsil fossa were rechecked for hemostasis. There was no  bleeding. The patient tolerated the procedure well.      With the surgical portion of the procedure complete, all instrumentation was then removed from the operative field. The patient's skin was cleaned.  The patient was returned to the care of the anesthesia team.  The patient was then weaned from his anesthetic and transported to the PACU in stable condition.

## 2024-04-05 NOTE — DISCHARGE INSTRUCTIONS
Adult Tonsillectomy Discharge Instructions    -Throat pain is greater the first few days after surgery, and may last up to two weeks. It may be severe at first. It is very important to stay well hydrated and ahead of the pain.  - staying hydrated is critical to avoiding dehydration and keeping the pain controlled. It will also help reduce the risk of bleeding. Drink small sips throughout the day. You will not have your usual appetite for up to two weeks after surgery, and this is normal. However, the key to recovery is to stay hydrated.  - No travel for 2 weeks.    - Call if poor drinking, bleeding, persistent fever >101.5 more than 1 day after surgery, or other concerning symptoms.     Activity/Restrictions:    - Avoid heavy lifting, straining or strenuous activities until instructed otherwise     Diet:   - Eat soft foods for 2 weeks, and nothing with sharp edges such as chips or pretzels. Spicy, citrus, or tomato-based foods may burn as well.     Pain Instructions:   - Alternate using acetaminophen/Tylenol and ibuprofen/Motrin every 3 hours for the first several days to control your pain. If this does not bring you relief or the pain remains severe, a stronger pain medication has been prescribed to you.   - Take Oxycodone as prescribed up to every 4 hours as needed if you are still having pain after taking Tylenol and Motrin.   - DO NOT MIX NARCOTIC PAIN MEDICATIONS OR TAKE NARCOTIC PRESCRIPTIONS AT THE SAME TIME (PERCODET, LORTAB, ROXICODONE, ETC.)   - DO NOT DRIVE OR OPERATE HEAVY MACHINERY WHILE ON NARCOTICS    - DO NOT TAKE MORE THAN 4 GRAMS (4000mg) OF TYLENOL (ACETAMINOPHEN) IN 24 HOURS       Follow Up:    - A follow up appointment has been scheduled for you as outlined below:   Future Appointments   Date Time Provider Department Center   4/22/2024  8:20 AM Diana Hernandez PA-C ON ENT  Medical          For any questions, please call our clinic our leave us a My Chart message. [facility]  Line: [phone], then ask for ENT Clinic.   For after hours questions and/or urgent concerns, call the same number above ([phone]) and ask for the on-call ENT physician.

## 2024-04-05 NOTE — ANESTHESIA PROCEDURE NOTES
Intubation    Date/Time: 4/5/2024 11:53 AM    Performed by: Todd Lawler CRNA  Authorized by: Dru Ling MD    Intubation:     Induction:  Intravenous    Intubated:  Postinduction    Mask Ventilation:  Easy mask    Attempts:  1    Method of Intubation:  Video laryngoscopy    Blade:  Locke 3    Laryngeal View Grade: Grade I - full view of cords      Difficult Airway Encountered?: No      Complications:  None    Airway Device:  Oral endotracheal tube    Airway Device Size:  7.5    Style/Cuff Inflation:  Cuffed (inflated to minimal occlusive pressure)    Inflation Amount (mL):  6    Tube secured:  22    Secured at:  The lips    Placement Verified By:  Capnometry    Complicating Factors:  Small mouth    Findings Post-Intubation:  BS equal bilateral and atraumatic/condition of teeth unchanged

## 2024-04-05 NOTE — TRANSFER OF CARE
"Anesthesia Transfer of Care Note    Patient: Suri Hameed    Procedure(s) Performed: Procedure(s) (LRB):  TONSILLECTOMY (Bilateral)    Patient location: PACU    Anesthesia Type: general    Transport from OR: Transported from OR on room air with adequate spontaneous ventilation    Post pain: adequate analgesia    Post assessment: no apparent anesthetic complications and tolerated procedure well    Post vital signs: stable    Level of consciousness: awake    Nausea/Vomiting: no nausea/vomiting    Complications: none    Transfer of care protocol was followed      Last vitals: Visit Vitals  /83 (BP Location: Right arm, Patient Position: Sitting)   Pulse 95   Temp 36.4 °C (97.6 °F) (Temporal)   Resp 18   Ht 5' 9" (1.753 m)   Wt 94.3 kg (208 lb 0.1 oz)   LMP 03/18/2024   Breastfeeding No   BMI 30.72 kg/m²     "

## 2024-04-05 NOTE — LETTER
April 5, 2024         [id number] [facility] [facility] [address]  [place] [address]  Phone: [phone]  Fax: [phone]       Patient: Suri Hameed   YOB: 1985  Date of Visit: 04/05/2024    To Whom It May Concern:    Dc Hameed  was at [facility] on 04/05/2024 for surgery with Dr. Cevallos. The patient may return to work/school on 4/22/2024 with no restrictions. If you have any questions or concerns, or if I can be of further assistance, please do not hesitate to contact me.    Sincerely,    Bambi Brooks RN/Juan M Cevallos MD

## 2024-04-05 NOTE — ANESTHESIA POSTPROCEDURE EVALUATION
Anesthesia Post Evaluation    Patient: Suri Hameed    Procedure(s) Performed: Procedure(s) (LRB):  TONSILLECTOMY (Bilateral)    Final Anesthesia Type: general      Patient location during evaluation: PACU  Patient participation: Yes- Able to Participate  Level of consciousness: awake  Post-procedure vital signs: reviewed and stable  Pain management: adequate  Airway patency: patent    PONV status at discharge: No PONV  Anesthetic complications: no      Cardiovascular status: stable  Respiratory status: unassisted  Hydration status: euvolemic  Follow-up not needed.              Vitals Value Taken Time   /75 04/05/24 1314   Temp 36.6 °C (97.8 °F) 04/05/24 1250   Pulse 94 04/05/24 1315   Resp 11 04/05/24 1315   SpO2 93 % 04/05/24 1315   Vitals shown include unvalidated device data.      No case tracking events are documented in the log.      Pain/Mykel Score: Pain Rating Prior to Med Admin: 7 (4/5/2024 12:56 PM)  Mykel Score: 9 (4/5/2024  1:15 PM)

## 2024-04-08 VITALS
RESPIRATION RATE: 19 BRPM | SYSTOLIC BLOOD PRESSURE: 133 MMHG | BODY MASS INDEX: 30.81 KG/M2 | HEIGHT: 69 IN | OXYGEN SATURATION: 96 % | HEART RATE: 100 BPM | WEIGHT: 208 LBS | DIASTOLIC BLOOD PRESSURE: 97 MMHG | TEMPERATURE: 98 F

## 2024-04-09 LAB
FINAL PATHOLOGIC DIAGNOSIS: NORMAL
GROSS: NORMAL
Lab: NORMAL

## 2024-04-15 ENCOUNTER — HOSPITAL ENCOUNTER (INPATIENT)
Facility: HOSPITAL | Age: 39
LOS: 2 days | Discharge: HOME | DRG: 392 | End: 2024-04-17
Attending: STUDENT IN AN ORGANIZED HEALTH CARE EDUCATION/TRAINING PROGRAM | Admitting: HOSPITALIST
Payer: OTHER GOVERNMENT

## 2024-04-15 DIAGNOSIS — K57.92 DIVERTICULITIS: ICD-10-CM

## 2024-04-15 DIAGNOSIS — R10.84 GENERALIZED ABDOMINAL PAIN: Primary | ICD-10-CM

## 2024-04-15 DIAGNOSIS — R07.9 CHEST PAIN: ICD-10-CM

## 2024-04-15 LAB
ALBUMIN SERPL BCP-MCNC: 3.6 G/DL (ref 3.5–5.2)
ALP SERPL-CCNC: 70 U/L (ref 55–135)
ALT SERPL W/O P-5'-P-CCNC: 25 U/L (ref 10–44)
ANION GAP SERPL CALC-SCNC: 12 MMOL/L (ref 8–16)
AST SERPL-CCNC: 14 U/L (ref 10–40)
B-HCG UR QL: NEGATIVE
BASOPHILS # BLD AUTO: 0.04 K/UL (ref 0–0.2)
BASOPHILS NFR BLD: 0.3 % (ref 0–1.9)
BILIRUB SERPL-MCNC: 0.7 MG/DL (ref 0.1–1)
BUN SERPL-MCNC: 13 MG/DL (ref 6–20)
CALCIUM SERPL-MCNC: 10.2 MG/DL (ref 8.7–10.5)
CHLORIDE SERPL-SCNC: 99 MMOL/L (ref 95–110)
CO2 SERPL-SCNC: 24 MMOL/L (ref 23–29)
CREAT SERPL-MCNC: 1 MG/DL (ref 0.5–1.4)
DIFFERENTIAL METHOD BLD: ABNORMAL
EOSINOPHIL # BLD AUTO: 0.1 K/UL (ref 0–0.5)
EOSINOPHIL NFR BLD: 0.4 % (ref 0–8)
ERYTHROCYTE [DISTWIDTH] IN BLOOD BY AUTOMATED COUNT: 14 % (ref 11.5–14.5)
EST. GFR  (NO RACE VARIABLE): >60 ML/MIN/1.73 M^2
GLUCOSE SERPL-MCNC: 85 MG/DL (ref 70–110)
HCT VFR BLD AUTO: 37.3 % (ref 37–48.5)
HGB BLD-MCNC: 11.7 G/DL (ref 12–16)
IMM GRANULOCYTES # BLD AUTO: 0.05 K/UL (ref 0–0.04)
IMM GRANULOCYTES NFR BLD AUTO: 0.4 % (ref 0–0.5)
LIPASE SERPL-CCNC: 23 U/L (ref 4–60)
LYMPHOCYTES # BLD AUTO: 2.6 K/UL (ref 1–4.8)
LYMPHOCYTES NFR BLD: 20 % (ref 18–48)
MCH RBC QN AUTO: 25 PG (ref 27–31)
MCHC RBC AUTO-ENTMCNC: 31.4 G/DL (ref 32–36)
MCV RBC AUTO: 80 FL (ref 82–98)
MONOCYTES # BLD AUTO: 0.8 K/UL (ref 0.3–1)
MONOCYTES NFR BLD: 6.5 % (ref 4–15)
NEUTROPHILS # BLD AUTO: 9.2 K/UL (ref 1.8–7.7)
NEUTROPHILS NFR BLD: 72.4 % (ref 38–73)
NRBC BLD-RTO: 0 /100 WBC
PLATELET # BLD AUTO: 605 K/UL (ref 150–450)
PMV BLD AUTO: 8.7 FL (ref 9.2–12.9)
POTASSIUM SERPL-SCNC: 3.9 MMOL/L (ref 3.5–5.1)
PROT SERPL-MCNC: 9.1 G/DL (ref 6–8.4)
RBC # BLD AUTO: 4.68 M/UL (ref 4–5.4)
SODIUM SERPL-SCNC: 135 MMOL/L (ref 136–145)
WBC # BLD AUTO: 12.77 K/UL (ref 3.9–12.7)

## 2024-04-15 PROCEDURE — 25000003 PHARM REV CODE 250: Performed by: REGISTERED NURSE

## 2024-04-15 PROCEDURE — 11000001 HC ACUTE MED/SURG PRIVATE ROOM

## 2024-04-15 PROCEDURE — 99285 EMERGENCY DEPT VISIT HI MDM: CPT | Mod: 25

## 2024-04-15 PROCEDURE — 80053 COMPREHEN METABOLIC PANEL: CPT | Performed by: NURSE PRACTITIONER

## 2024-04-15 PROCEDURE — 63600175 PHARM REV CODE 636 W HCPCS: Performed by: NURSE PRACTITIONER

## 2024-04-15 PROCEDURE — 25500020 PHARM REV CODE 255: Performed by: REGISTERED NURSE

## 2024-04-15 PROCEDURE — 96375 TX/PRO/DX INJ NEW DRUG ADDON: CPT

## 2024-04-15 PROCEDURE — 81025 URINE PREGNANCY TEST: CPT | Performed by: NURSE PRACTITIONER

## 2024-04-15 PROCEDURE — 63600175 PHARM REV CODE 636 W HCPCS: Mod: JZ,JG | Performed by: REGISTERED NURSE

## 2024-04-15 PROCEDURE — 85025 COMPLETE CBC W/AUTO DIFF WBC: CPT | Performed by: NURSE PRACTITIONER

## 2024-04-15 PROCEDURE — 25000003 PHARM REV CODE 250: Performed by: NURSE PRACTITIONER

## 2024-04-15 PROCEDURE — 96361 HYDRATE IV INFUSION ADD-ON: CPT

## 2024-04-15 PROCEDURE — 96365 THER/PROPH/DIAG IV INF INIT: CPT

## 2024-04-15 PROCEDURE — 83690 ASSAY OF LIPASE: CPT | Performed by: NURSE PRACTITIONER

## 2024-04-15 RX ORDER — METRONIDAZOLE 500 MG/100ML
500 INJECTION, SOLUTION INTRAVENOUS
Status: DISCONTINUED | OUTPATIENT
Start: 2024-04-16 | End: 2024-04-17 | Stop reason: HOSPADM

## 2024-04-15 RX ORDER — MORPHINE SULFATE 4 MG/ML
2 INJECTION, SOLUTION INTRAMUSCULAR; INTRAVENOUS EVERY 4 HOURS PRN
Status: DISCONTINUED | OUTPATIENT
Start: 2024-04-15 | End: 2024-04-17 | Stop reason: HOSPADM

## 2024-04-15 RX ORDER — ACETAMINOPHEN 325 MG/1
650 TABLET ORAL EVERY 8 HOURS PRN
Status: DISCONTINUED | OUTPATIENT
Start: 2024-04-15 | End: 2024-04-17 | Stop reason: HOSPADM

## 2024-04-15 RX ORDER — ALUMINUM HYDROXIDE, MAGNESIUM HYDROXIDE, AND SIMETHICONE 1200; 120; 1200 MG/30ML; MG/30ML; MG/30ML
30 SUSPENSION ORAL 4 TIMES DAILY PRN
Status: DISCONTINUED | OUTPATIENT
Start: 2024-04-15 | End: 2024-04-17 | Stop reason: HOSPADM

## 2024-04-15 RX ORDER — LIDOCAINE HYDROCHLORIDE 20 MG/ML
15 SOLUTION OROPHARYNGEAL ONCE
Status: COMPLETED | OUTPATIENT
Start: 2024-04-15 | End: 2024-04-15

## 2024-04-15 RX ORDER — SIMETHICONE 80 MG
1 TABLET,CHEWABLE ORAL 4 TIMES DAILY PRN
Status: DISCONTINUED | OUTPATIENT
Start: 2024-04-15 | End: 2024-04-17 | Stop reason: HOSPADM

## 2024-04-15 RX ORDER — SODIUM CHLORIDE 0.9 % (FLUSH) 0.9 %
3 SYRINGE (ML) INJECTION EVERY 12 HOURS PRN
Status: DISCONTINUED | OUTPATIENT
Start: 2024-04-15 | End: 2024-04-17 | Stop reason: HOSPADM

## 2024-04-15 RX ORDER — ALUMINUM HYDROXIDE, MAGNESIUM HYDROXIDE, AND SIMETHICONE 1200; 120; 1200 MG/30ML; MG/30ML; MG/30ML
30 SUSPENSION ORAL ONCE
Status: COMPLETED | OUTPATIENT
Start: 2024-04-15 | End: 2024-04-15

## 2024-04-15 RX ORDER — IBUPROFEN 200 MG
16 TABLET ORAL
Status: DISCONTINUED | OUTPATIENT
Start: 2024-04-15 | End: 2024-04-17 | Stop reason: HOSPADM

## 2024-04-15 RX ORDER — GLUCAGON 1 MG
1 KIT INJECTION
Status: DISCONTINUED | OUTPATIENT
Start: 2024-04-15 | End: 2024-04-17 | Stop reason: HOSPADM

## 2024-04-15 RX ORDER — SODIUM CHLORIDE 9 MG/ML
1000 INJECTION, SOLUTION INTRAVENOUS
Status: COMPLETED | OUTPATIENT
Start: 2024-04-15 | End: 2024-04-15

## 2024-04-15 RX ORDER — TALC
6 POWDER (GRAM) TOPICAL NIGHTLY PRN
Status: DISCONTINUED | OUTPATIENT
Start: 2024-04-15 | End: 2024-04-17 | Stop reason: HOSPADM

## 2024-04-15 RX ORDER — CIPROFLOXACIN 2 MG/ML
400 INJECTION, SOLUTION INTRAVENOUS
Status: DISCONTINUED | OUTPATIENT
Start: 2024-04-15 | End: 2024-04-17 | Stop reason: HOSPADM

## 2024-04-15 RX ORDER — HYDROCODONE BITARTRATE AND ACETAMINOPHEN 5; 325 MG/1; MG/1
1 TABLET ORAL EVERY 6 HOURS PRN
Status: DISCONTINUED | OUTPATIENT
Start: 2024-04-15 | End: 2024-04-17 | Stop reason: HOSPADM

## 2024-04-15 RX ORDER — IBUPROFEN 200 MG
24 TABLET ORAL
Status: DISCONTINUED | OUTPATIENT
Start: 2024-04-15 | End: 2024-04-17 | Stop reason: HOSPADM

## 2024-04-15 RX ORDER — POLYETHYLENE GLYCOL 3350 17 G/17G
17 POWDER, FOR SOLUTION ORAL DAILY PRN
Status: DISCONTINUED | OUTPATIENT
Start: 2024-04-15 | End: 2024-04-17 | Stop reason: HOSPADM

## 2024-04-15 RX ORDER — SODIUM CHLORIDE 9 MG/ML
INJECTION, SOLUTION INTRAVENOUS CONTINUOUS
Status: DISCONTINUED | OUTPATIENT
Start: 2024-04-15 | End: 2024-04-17 | Stop reason: HOSPADM

## 2024-04-15 RX ORDER — NALOXONE HCL 0.4 MG/ML
0.02 VIAL (ML) INJECTION
Status: DISCONTINUED | OUTPATIENT
Start: 2024-04-15 | End: 2024-04-17 | Stop reason: HOSPADM

## 2024-04-15 RX ORDER — ACETAMINOPHEN 650 MG/1
650 SUPPOSITORY RECTAL EVERY 4 HOURS PRN
Status: DISCONTINUED | OUTPATIENT
Start: 2024-04-15 | End: 2024-04-17 | Stop reason: HOSPADM

## 2024-04-15 RX ORDER — SODIUM CHLORIDE 9 MG/ML
INJECTION, SOLUTION INTRAVENOUS CONTINUOUS
Status: DISCONTINUED | OUTPATIENT
Start: 2024-04-15 | End: 2024-04-15

## 2024-04-15 RX ORDER — ONDANSETRON HYDROCHLORIDE 2 MG/ML
8 INJECTION, SOLUTION INTRAVENOUS
Status: COMPLETED | OUTPATIENT
Start: 2024-04-15 | End: 2024-04-15

## 2024-04-15 RX ORDER — ONDANSETRON HYDROCHLORIDE 2 MG/ML
4 INJECTION, SOLUTION INTRAVENOUS EVERY 8 HOURS PRN
Status: DISCONTINUED | OUTPATIENT
Start: 2024-04-15 | End: 2024-04-17 | Stop reason: HOSPADM

## 2024-04-15 RX ORDER — METRONIDAZOLE 500 MG/100ML
500 INJECTION, SOLUTION INTRAVENOUS
Status: COMPLETED | OUTPATIENT
Start: 2024-04-15 | End: 2024-04-15

## 2024-04-15 RX ORDER — PROMETHAZINE HYDROCHLORIDE 25 MG/1
25 TABLET ORAL EVERY 6 HOURS PRN
Status: DISCONTINUED | OUTPATIENT
Start: 2024-04-15 | End: 2024-04-17 | Stop reason: HOSPADM

## 2024-04-15 RX ADMIN — SODIUM CHLORIDE: 9 INJECTION, SOLUTION INTRAVENOUS at 07:04

## 2024-04-15 RX ADMIN — SODIUM CHLORIDE 1000 ML: 9 INJECTION, SOLUTION INTRAVENOUS at 01:04

## 2024-04-15 RX ADMIN — ONDANSETRON 8 MG: 2 INJECTION INTRAMUSCULAR; INTRAVENOUS at 01:04

## 2024-04-15 RX ADMIN — IOHEXOL 25 ML: 350 INJECTION, SOLUTION INTRAVENOUS at 06:04

## 2024-04-15 RX ADMIN — CIPROFLOXACIN 400 MG: 400 INJECTION, SOLUTION INTRAVENOUS at 09:04

## 2024-04-15 RX ADMIN — METRONIDAZOLE 500 MG: 5 INJECTION, SOLUTION INTRAVENOUS at 07:04

## 2024-04-15 RX ADMIN — IOHEXOL 100 ML: 350 INJECTION, SOLUTION INTRAVENOUS at 05:04

## 2024-04-15 RX ADMIN — LIDOCAINE HYDROCHLORIDE 15 ML: 20 SOLUTION ORAL at 02:04

## 2024-04-15 RX ADMIN — SODIUM CHLORIDE: 9 INJECTION, SOLUTION INTRAVENOUS at 09:04

## 2024-04-15 RX ADMIN — ALUMINUM HYDROXIDE, MAGNESIUM HYDROXIDE, AND SIMETHICONE 30 ML: 200; 200; 20 SUSPENSION ORAL at 02:04

## 2024-04-15 NOTE — FIRST PROVIDER EVALUATION
Medical screening examination initiated.  I have conducted a focused provider triage encounter, findings are as follows:    Brief history of present illness:  38-year-old female with complaint of epigastric pain over the past 2 days.  Patient reports mild nausea.    There were no vitals filed for this visit.    Pertinent physical exam: epigastric tenderness    Brief workup plan: labs, GI cocktail    Preliminary workup initiated; this workup will be continued and followed by the physician or advanced practice provider that is assigned to the patient when roomed.

## 2024-04-15 NOTE — ED PROVIDER NOTES
Encounter Date: 4/15/2024       History     Chief Complaint   Patient presents with    Abdominal Pain     Mid to upper abdominal pain since last pm, denies nausea, vomiting or diarrhea.     30-year-old female presents emergency department with complaints of abdominal pain.  Patient states pain began 2 days ago.  Patient denies any nausea/vomiting/diarrhea, fever, chills, weakness, dizziness or any other symptoms.    The history is provided by the patient.     Review of patient's allergies indicates:  No Known Allergies  Past Medical History:   Diagnosis Date    Mild intermittent asthma, uncomplicated     Sleep apnea      Past Surgical History:   Procedure Laterality Date    TONSILLECTOMY Bilateral 4/5/2024    Procedure: TONSILLECTOMY;  Surgeon: Juan M Cevallos MD;  Location: [facility];  Service: ENT;  Laterality: Bilateral;     Family History   Problem Relation Name Age of Onset    Heart disease Father      Seizures Sister       Social History     Tobacco Use    Smoking status: Never     Passive exposure: Never   Substance Use Topics    Alcohol use: Yes     Comment: OCC    Drug use: Never     Review of Systems   Constitutional:  Negative for fever.   HENT:  Negative for sore throat.    Respiratory:  Negative for shortness of breath.    Cardiovascular:  Negative for chest pain.   Gastrointestinal:  Positive for abdominal pain. Negative for nausea.   Genitourinary:  Negative for dysuria.   Musculoskeletal:  Negative for back pain.   Skin:  Negative for rash.   Neurological:  Negative for weakness.   Hematological:  Does not bruise/bleed easily.   All other systems reviewed and are negative.      Physical Exam     Initial Vitals [04/15/24 1232]   BP Pulse Resp Temp SpO2   137/83 104 20 98.2 °F (36.8 °C) 99 %      MAP       --         Physical Exam    Constitutional: She appears well-developed and well-nourished. She is not diaphoretic. No distress.   HENT:   Head: Normocephalic and atraumatic.   Eyes: Conjunctivae and  EOM are normal. Pupils are equal, round, and reactive to light.   Neck: Neck supple.   Normal range of motion.  Cardiovascular:  Normal rate, regular rhythm and normal heart sounds.           No murmur heard.  Pulmonary/Chest: Breath sounds normal. No respiratory distress. She has no wheezes. She has no rales.   Abdominal: Abdomen is soft. Bowel sounds are normal. There is generalized abdominal tenderness. There is no rebound and no guarding.   Musculoskeletal:         General: No tenderness or edema. Normal range of motion.      Cervical back: Normal range of motion and neck supple.     Neurological: She is alert and oriented to person, place, and time. No cranial nerve deficit. GCS score is 15. GCS eye subscore is 4. GCS verbal subscore is 5. GCS motor subscore is 6.   Skin: Skin is warm and dry. Capillary refill takes less than 2 seconds.   Psychiatric: She has a normal mood and affect. Thought content normal.         ED Course   Procedures  Labs Reviewed   CBC W/ AUTO DIFFERENTIAL - Abnormal; Notable for the following components:       Result Value    WBC 12.77 (*)     Hemoglobin 11.7 (*)     MCV 80 (*)     MCH 25.0 (*)     MCHC 31.4 (*)     Platelets 605 (*)     MPV 8.7 (*)     Gran # (ANC) 9.2 (*)     Immature Grans (Abs) 0.05 (*)     All other components within normal limits   COMPREHENSIVE METABOLIC PANEL - Abnormal; Notable for the following components:    Sodium 135 (*)     Total Protein 9.1 (*)     All other components within normal limits   LIPASE   PREGNANCY TEST, URINE RAPID    Narrative:     Specimen Source->Urine          Imaging Results              CT Abdomen Pelvis With IV Contrast NO Oral Contrast (Final result)  Result time 04/15/24 18:41:56      Final result by Marly Garcia MD (04/15/24 18:41:56)                   Impression:      Again there is regional mural thickening, underlying diverticulosis surrounding stranding and mesenteric prominent lymph nodes at the distal descending colon.   No overt perforation.  Associated mesenteric phlegmon possible 2 cm.  Again findings may be inflammatory related to diverticulitis versus neoplastic.    Scant ascites present.    No obstructive bowel findings.    No sizable liver lesion    No adrenal gland mass    Pancreas, spleen and kidneys stable unremarkable.    Urinary bladder decompressed      Electronically signed by: Marlykamila Garcia  Date:    04/15/2024  Time:    18:41               Narrative:    EXAMINATION:  CT ABDOMEN PELVIS WITH IV CONTRAST    CLINICAL HISTORY:  Abdominal pain, acute, nonlocalized;    TECHNIQUE:  Low dose axial images, sagittal and coronal reformations were obtained from the lung bases to the pubic symphysis following the IV administration of 100 mL of Omnipaque 350 and rectal contrast    COMPARISON:  Earlier imaging same date noncontrast                                       CT Abdomen Pelvis  Without Contrast (Final result)  Result time 04/15/24 15:57:33      Final result by Todd Mitchell MD (04/15/24 15:57:33)                   Impression:      There is a combination of concentric wall thickening and haziness and stranding in the surrounding fat involving a segment of the mid to distal descending colon measuring 8 cm in length.  Considerations include diverticulitis versus neoplasm.  No evidence of perforation is visible.    There are numerous prominent mesenteric lymph nodes identified medial to the left colon and in the retroperitoneum in the left para-aortic space.  This appearance is somewhat more suspicious for neoplasm.  Correlation with patient history and further evaluation by colonoscopy should be considered.    Moderate collection of dependent free pelvic fluid likely associated with the process in the left colon.    Incidental retroverted uterus.      Electronically signed by: Todd Mitchell  Date:    04/15/2024  Time:    15:57               Narrative:    EXAMINATION:  CT ABDOMEN PELVIS WITHOUT  CONTRAST    CLINICAL HISTORY:  Abdominal pain, acute, nonlocalized;    TECHNIQUE:  Low dose axial images, sagittal and coronal reformations were obtained from the lung bases to the pubic symphysis.  30 mL of oral Omnipaque 350 was administered.    COMPARISON:  None    FINDINGS:  The lung bases are clear and heart size is normal.    No gross parenchymal abnormalities are identified in the liver or spleen.  The pancreas, gallbladder and adrenals appear normal.  No parenchymal abnormality, intrarenal calculi or hydronephrosis are identified in either kidney.  There is a collection of free fluid visible in the left paracolic gutter extending into the pelvis associated with a segment of concentric wall thickening involving a segment of the mid to distal descending colon measuring approximately 8 cm in length consistent with diverticulitis or possible neoplasm.  There is haziness and stranding in the surrounding pericolic fat and multiple prominent mesenteric lymph nodes medial to the left colon in the abdomen and in the retroperitoneum in the left periaortic space.  Multiple colonic diverticula are visible projecting from the walls of the descending colon from the level of the splenic flexure through the distal segment.  No free air is visible to indicate evidence of perforation.    Images obtained through the pelvis demonstrate no abnormality in the urinary bladder.  The uterus is retroverted but otherwise unremarkable for patient age.  The ovaries appear normal for patient age including in incidental 1.7 cm follicular cyst on the right.  Moderate dependent free pelvic fluid is felt to be associated with the process in the left colon.                                       Medications   metronidazole IVPB 500 mg (500 mg Intravenous New Bag 4/15/24 1933)   ciprofloxacin (CIPRO)400mg/200ml D5W IVPB 400 mg (has no administration in time range)   0.9%  NaCl infusion ( Intravenous New Bag 4/15/24 1931)   aluminum-magnesium  hydroxide-simethicone 200-200-20 mg/5 mL suspension 30 mL (30 mLs Oral Given 4/15/24 1403)     And   LIDOcaine viscous HCl 2% oral solution 15 mL (15 mLs Oral Given 4/15/24 1403)   0.9%  NaCl infusion (1,000 mLs Intravenous New Bag 4/15/24 1348)   ondansetron injection 8 mg (8 mg Intravenous Given 4/15/24 1349)   iohexoL (OMNIPAQUE 350) injection 100 mL (100 mLs Intravenous Given 4/15/24 1724)   iohexoL (OMNIPAQUE 350) injection 25 mL (25 mLs Rectal Given 4/15/24 1817)     Medical Decision Making  Differential diagnosis:  Acute appendicitis, diverticulitis, acute cholecystitis    Amount and/or Complexity of Data Reviewed  Labs: ordered.     Details: Labs Reviewed  CBC W/ AUTO DIFFERENTIAL - Abnormal; Notable for the following components:      Result Value   WBC 12.77 (*)    Hemoglobin 11.7 (*)    MCV 80 (*)    MCH 25.0 (*)    MCHC 31.4 (*)    Platelets 605 (*)    MPV 8.7 (*)    Gran # (ANC) 9.2 (*)    Immature Grans (Abs) 0.05 (*)    All other components within normal limits  COMPREHENSIVE METABOLIC PANEL - Abnormal; Notable for the following components:   Sodium 135 (*)    Total Protein 9.1 (*)    All other components within normal limits  LIPASE  PREGNANCY TEST, URINE RAPID   Narrative:    Specimen Source->Urine    Radiology: ordered.     Details: Diverticulitis versus neoplasm on CT scan    Risk  Prescription drug management.  Risk Details: Patient admitted for IV antibiotics and serial abdominal exams.  Patient will be followed up by General surgery.         4:33 PM: I discussed the pt's case with Dr. Rosenthal (General surgery) who recommends repeating CT scan with IV contrast.    7:03 PM: Dr. Rosenthal recommends admission and IV antibiotics.    7:58 PM: Discussed case with Clarke Spann ([facility]). Dr. Spann agrees with current care and management of pt and accepts admission.   Admitting Service: Hospital medicine   Admitting Physician: Maria Ines  Admit to: [facility]                                    Clinical Impression:  Final diagnoses:  [R10.84] Generalized abdominal pain (Primary)          ED Disposition Condition    Admit Stable                Lance Fields Jr., TRINIDADP  04/15/24 2004

## 2024-04-16 PROBLEM — J45.909 UNCOMPLICATED ASTHMA: Status: ACTIVE | Noted: 2024-04-16

## 2024-04-16 PROBLEM — K57.92 DIVERTICULITIS: Status: ACTIVE | Noted: 2024-04-16

## 2024-04-16 PROBLEM — R10.84 GENERALIZED ABDOMINAL PAIN: Status: ACTIVE | Noted: 2024-04-16

## 2024-04-16 PROBLEM — G47.30 SLEEP APNEA: Status: ACTIVE | Noted: 2024-04-16

## 2024-04-16 LAB
ALBUMIN SERPL BCP-MCNC: 2.8 G/DL (ref 3.5–5.2)
ALP SERPL-CCNC: 61 U/L (ref 55–135)
ALT SERPL W/O P-5'-P-CCNC: 17 U/L (ref 10–44)
ANION GAP SERPL CALC-SCNC: 10 MMOL/L (ref 8–16)
AST SERPL-CCNC: 12 U/L (ref 10–40)
BASOPHILS # BLD AUTO: 0.03 K/UL (ref 0–0.2)
BASOPHILS NFR BLD: 0.3 % (ref 0–1.9)
BILIRUB DIRECT SERPL-MCNC: 0.3 MG/DL (ref 0.1–0.3)
BILIRUB SERPL-MCNC: 0.6 MG/DL (ref 0.1–1)
BUN SERPL-MCNC: 7 MG/DL (ref 6–20)
CALCIUM SERPL-MCNC: 8.7 MG/DL (ref 8.7–10.5)
CHLORIDE SERPL-SCNC: 102 MMOL/L (ref 95–110)
CO2 SERPL-SCNC: 22 MMOL/L (ref 23–29)
CREAT SERPL-MCNC: 0.9 MG/DL (ref 0.5–1.4)
DIFFERENTIAL METHOD BLD: ABNORMAL
EOSINOPHIL # BLD AUTO: 0.1 K/UL (ref 0–0.5)
EOSINOPHIL NFR BLD: 0.6 % (ref 0–8)
ERYTHROCYTE [DISTWIDTH] IN BLOOD BY AUTOMATED COUNT: 13.7 % (ref 11.5–14.5)
EST. GFR  (NO RACE VARIABLE): >60 ML/MIN/1.73 M^2
GLUCOSE SERPL-MCNC: 143 MG/DL (ref 70–110)
HCT VFR BLD AUTO: 27.8 % (ref 37–48.5)
HGB BLD-MCNC: 8.8 G/DL (ref 12–16)
IMM GRANULOCYTES # BLD AUTO: 0.04 K/UL (ref 0–0.04)
IMM GRANULOCYTES NFR BLD AUTO: 0.4 % (ref 0–0.5)
LYMPHOCYTES # BLD AUTO: 2.6 K/UL (ref 1–4.8)
LYMPHOCYTES NFR BLD: 27.2 % (ref 18–48)
MAGNESIUM SERPL-MCNC: 2 MG/DL (ref 1.6–2.6)
MCH RBC QN AUTO: 25.1 PG (ref 27–31)
MCHC RBC AUTO-ENTMCNC: 31.7 G/DL (ref 32–36)
MCV RBC AUTO: 79 FL (ref 82–98)
MONOCYTES # BLD AUTO: 0.6 K/UL (ref 0.3–1)
MONOCYTES NFR BLD: 5.8 % (ref 4–15)
NEUTROPHILS # BLD AUTO: 6.3 K/UL (ref 1.8–7.7)
NEUTROPHILS NFR BLD: 65.7 % (ref 38–73)
NRBC BLD-RTO: 0 /100 WBC
PHOSPHATE SERPL-MCNC: 2.7 MG/DL (ref 2.7–4.5)
PLATELET # BLD AUTO: 439 K/UL (ref 150–450)
PMV BLD AUTO: 8.6 FL (ref 9.2–12.9)
POTASSIUM SERPL-SCNC: 3.9 MMOL/L (ref 3.5–5.1)
PROT SERPL-MCNC: 6.3 G/DL (ref 6–8.4)
RBC # BLD AUTO: 3.51 M/UL (ref 4–5.4)
SODIUM SERPL-SCNC: 134 MMOL/L (ref 136–145)
WBC # BLD AUTO: 9.61 K/UL (ref 3.9–12.7)

## 2024-04-16 PROCEDURE — 11000001 HC ACUTE MED/SURG PRIVATE ROOM

## 2024-04-16 PROCEDURE — 25000003 PHARM REV CODE 250: Performed by: NURSE PRACTITIONER

## 2024-04-16 PROCEDURE — 99223 1ST HOSP IP/OBS HIGH 75: CPT | Mod: ,,, | Performed by: COLON & RECTAL SURGERY

## 2024-04-16 PROCEDURE — 36415 COLL VENOUS BLD VENIPUNCTURE: CPT | Performed by: COLON & RECTAL SURGERY

## 2024-04-16 PROCEDURE — 83735 ASSAY OF MAGNESIUM: CPT | Performed by: COLON & RECTAL SURGERY

## 2024-04-16 PROCEDURE — 63600175 PHARM REV CODE 636 W HCPCS: Mod: JZ,JG | Performed by: NURSE PRACTITIONER

## 2024-04-16 PROCEDURE — 85025 COMPLETE CBC W/AUTO DIFF WBC: CPT | Performed by: COLON & RECTAL SURGERY

## 2024-04-16 PROCEDURE — 94761 N-INVAS EAR/PLS OXIMETRY MLT: CPT

## 2024-04-16 PROCEDURE — 80048 BASIC METABOLIC PNL TOTAL CA: CPT | Performed by: COLON & RECTAL SURGERY

## 2024-04-16 PROCEDURE — 80076 HEPATIC FUNCTION PANEL: CPT | Performed by: COLON & RECTAL SURGERY

## 2024-04-16 PROCEDURE — 84100 ASSAY OF PHOSPHORUS: CPT | Performed by: COLON & RECTAL SURGERY

## 2024-04-16 PROCEDURE — 63600175 PHARM REV CODE 636 W HCPCS: Performed by: REGISTERED NURSE

## 2024-04-16 RX ADMIN — SODIUM CHLORIDE: 9 INJECTION, SOLUTION INTRAVENOUS at 03:04

## 2024-04-16 RX ADMIN — CIPROFLOXACIN 400 MG: 400 INJECTION, SOLUTION INTRAVENOUS at 08:04

## 2024-04-16 RX ADMIN — METRONIDAZOLE 500 MG: 5 INJECTION, SOLUTION INTRAVENOUS at 03:04

## 2024-04-16 RX ADMIN — HYDROCODONE BITARTRATE AND ACETAMINOPHEN 1 TABLET: 5; 325 TABLET ORAL at 08:04

## 2024-04-16 RX ADMIN — METRONIDAZOLE 500 MG: 5 INJECTION, SOLUTION INTRAVENOUS at 11:04

## 2024-04-16 RX ADMIN — HYDROCODONE BITARTRATE AND ACETAMINOPHEN 1 TABLET: 5; 325 TABLET ORAL at 09:04

## 2024-04-16 RX ADMIN — METRONIDAZOLE 500 MG: 5 INJECTION, SOLUTION INTRAVENOUS at 09:04

## 2024-04-16 RX ADMIN — HYDROCODONE BITARTRATE AND ACETAMINOPHEN 1 TABLET: 5; 325 TABLET ORAL at 03:04

## 2024-04-16 NOTE — PLAN OF CARE
[facility]  Initial Discharge Assessment       Primary Care Provider: Neelam Benjamin    Admission Diagnosis: Generalized abdominal pain [R10.84]  Chest pain [R07.9]    Admission Date: 4/15/2024  Expected Discharge Date:     Transition of Care Barriers: None    Payor: [employer] / Plan: [place] [employer] / Product Type: Government /     Extended Emergency Contact Information  Primary Emergency Contact: JANET MANCIA  Mobile Phone: [phone]  Relation: Relative  Preferred language: English   needed? No  Secondary Emergency Contact: Ryan Howard   [place]  Mobile Phone: [phone]  Relation: Significant other    Discharge Plan A: Home with family         [id number] - [place] - [address] AT [facility]  [address]  [place] [address]  Phone: [phone] Fax: [phone]      Initial Assessment (most recent)       Adult Discharge Assessment - 04/16/24 1144          Discharge Assessment    Assessment Type Discharge Planning Assessment     Confirmed/corrected address, phone number and insurance Yes     Confirmed Demographics Correct on Facesheet     Source of Information patient     Communicated ANATOLIY with patient/caregiver Date not available/Unable to determine     Reason For Admission abd pain     People in Home alone     Facility Arrived From: home     Do you expect to return to your current living situation? Yes     Do you have help at home or someone to help you manage your care at home? Yes     Who are your caregiver(s) and their phone number(s)? family     Prior to hospitilization cognitive status: Alert/Oriented     Current cognitive status: Alert/Oriented     Walking or Climbing Stairs Difficulty no     Dressing/Bathing Difficulty no     Home Accessibility stairs to enter home   3rd floor apt    Equipment Currently Used at Home CPAP     Readmission within 30 days? No     Patient currently being followed  by outpatient case management? No     Do you currently have service(s) that help you manage your care at home? No     Do you take prescription medications? Yes     Do you have prescription coverage? Yes     Coverage [place]     Do you have any problems affording any of your prescribed medications? No     Is the patient taking medications as prescribed? yes     Who is going to help you get home at discharge? family     How do you get to doctors appointments? car, drives self     Are you on dialysis? No     Do you take coumadin? No     Discharge Plan A Home with family     DME Needed Upon Discharge  none     Discharge Plan discussed with: Patient     Transition of Care Barriers None                   Anticipated DC dispo: home with family   Prior Level of Function: independent with ADLs   People in home: lives alone     Comments:  CM met with patient at bedside to introduce role and discuss discharge planning. Family will be help at home and can provide transport at time of discharge. Confirmed demographics, insurance, and emergency contacts. CM discharge needs depends on hospital progress. CM will continue following to assist with other needs.

## 2024-04-16 NOTE — HOSPITAL COURSE
4/16  Admitted for diverticulitis with mesenteric phlegmon  On IV antibiotics, IV fluids, bowel rest  Surgery following  Pain currently 3/10, LLQ, improving  Reports flatus, no BM    4/17  Pain resolved, tolerating diet  Stable for discharge home  F/U with surgery and PCP for further management

## 2024-04-16 NOTE — HPI
37yo F with a PMHx significant for asthma and sleep apnea  who presented to the emergency room for evaluation of diffuse abdominal pain over 2-3 days.  Patient states that her pain is located in the left lower quadrant and left flank.  Has previously had episodes of diverticulitis including requiring 1 admission in [place] for IV antibiotics but did not require any percutaneous drainage or surgical intervention.   Workup in the ER was concerning for leukocytosis of 12k and a CT scan concerning for a phlegmon near the descending colon concerning for diverticulitis versus malignancy. There was an associated phlegmon in the mesentery. She was admitted to the hospital medical service and surgery is consulted for evaluation.   She reports previously having diverticulitis requiring hospital admission in 2022 in [place] that resolved with IV antibiotics and bowel rest.  This was located in the sigmoid colon/left lower quadrant.  The pain this time as higher up than the previous episodes.  She reports having colonoscopy in [place], [place] in 2023 where diverticulosis was seen as well as 1 polyp was removed.  Denies any previous abdominal surgical history.  Denies any fever, chills, nausea, vomiting, hematochezia or melena.

## 2024-04-16 NOTE — ASSESSMENT & PLAN NOTE
Likely secondary to diverticulitis versus mesenteric phlegmon.  General surgery consulted.  IV antibiotics initiated in ED.  Plan:  -serial abdominal exams   -continue IV antibiotics   -repeat labs in a.m.  -general surgery consulted

## 2024-04-16 NOTE — ASSESSMENT & PLAN NOTE
37yo F with likely descending diverticulitis with mesenteric phlegmon    - No acute surgical intervention required at this time.  Given the previous history of diverticulitis as well as previous colonoscopy last year, I believe this to be more related to diverticular flare as opposed to malignancy.  Would treat with IV antibiotics and bowel rest and see if the pain and clinical condition improve.  If this is not improve with IV antibiotics and bowel rest, could require surgical intervention during this admission.  This was discussed with the patient and she is amenable to this plan.  - keep NPO for now, okay for sips of water and ice chips  - IVF  - IV abx  - OOB, ambulation encouraged

## 2024-04-16 NOTE — NURSING
Pt remains free of falls/injury. Safety precautions maintained. Pt . IV abx given.  Pt ambulated to the bathroom w/o any notes distress.  Regular diet tolerated. VSS. No S/S of distress noted at this time. Bed alarm set.12 hour chart check complete. Will continue to monitor.

## 2024-04-16 NOTE — H&P
[facility] - Med Surg 3  [facility] Medicine  History & Physical    Patient Name: Suri Hameed  MRN: [id number]  Patient Class: IP- Inpatient  Admission Date: 4/15/2024  Attending Physician: Rashid Spann MD   Primary Care Provider: [facility] Primary Doctor         Patient information was obtained from patient, past medical records, and ER records.     Subjective:     Principal Problem:Generalized abdominal pain    Chief Complaint:   Chief Complaint   Patient presents with    Abdominal Pain     Mid to upper abdominal pain since last pm, denies nausea, vomiting or diarrhea.        HPI: Suri Hameed is a 38 y.o. female with a PMH  has a past medical history of Mild intermittent asthma, uncomplicated and Sleep apnea.  Presented to the ER for diffuse abdominal pain gradually worsened over the last few days.  Patient states her pain is more prominent in the left lower quadrant.  Denies any suspicious food intake, Tiffani illnesses, or sick contacts.  Denies any associated fever, chills, sweats, nausea, vomiting, bladder/bowel complaints, or any other symptoms at this time.    ER workup revealed leukocytosis of 12.77, H/H of 11.7/37.3, and CT scan demonstrated:[Again there is regional mural thickening, underlying diverticulosis surrounding stranding and mesenteric prominent lymph nodes at the distal descending colon. No overt perforation. Associated mesenteric phlegmon possible 2 cm. Again findings may be inflammatory related to diverticulitis versus neoplastic].  ER provider discussed findings with general surgeon on-call (Dr. Rosenthal) who recommended hospital Medicine to admit for IV antibiotics and serial abdominal exams.  Will see patient was consult in a.m. patient in agreement with treatment plan.  Patient will be admitted under inpatient status.    PCP: Tiera Primary Doctor      Past Medical History:   Diagnosis Date    Mild intermittent asthma, uncomplicated     Sleep apnea        Past Surgical History:    Procedure Laterality Date    TONSILLECTOMY Bilateral 4/5/2024    Procedure: TONSILLECTOMY;  Surgeon: Juan M Cevallos MD;  Location: [facility];  Service: ENT;  Laterality: Bilateral;       Review of patient's allergies indicates:  No Known Allergies    Current Facility-Administered Medications   Medication Dose Route Frequency Provider Last Rate Last Admin    0.9%  NaCl infusion   Intravenous Continuous Reese Spann  mL/hr at 04/16/24 0343 New Bag at 04/16/24 0343    acetaminophen suppository 650 mg  650 mg Rectal Q4H PRN Reese Spann NP        acetaminophen tablet 650 mg  650 mg Oral Q8H PRN Reese Spann NP        aluminum-magnesium hydroxide-simethicone 200-200-20 mg/5 mL suspension 30 mL  30 mL Oral QID PRN Reese Spann NP        ciprofloxacin (CIPRO)400mg/200ml D5W IVPB 400 mg  400 mg Intravenous Q12H Lance Fields Jr., FNP   Stopped at 04/15/24 2202    dextrose 10% bolus 125 mL 125 mL  12.5 g Intravenous PRN Reese Spann NP        dextrose 10% bolus 250 mL 250 mL  25 g Intravenous PRN Reese Spann NP        glucagon (human recombinant) injection 1 mg  1 mg Intramuscular PRN Reese Spann NP        glucose chewable tablet 16 g  16 g Oral PRN Reese Spann NP        glucose chewable tablet 24 g  24 g Oral PRN Reese Spann NP        HYDROcodone-acetaminophen 5-325 mg per tablet 1 tablet  1 tablet Oral Q6H PRN Reese Spann NP        melatonin tablet 6 mg  6 mg Oral Nightly PRN Reese Spann NP        metronidazole IVPB 500 mg  500 mg Intravenous Q8H Reese Spann NP   Stopped at 04/16/24 0446    morphine injection 2 mg  2 mg Intravenous Q4H PRN Reese Spann NP        naloxone 0.4 mg/mL injection 0.02 mg  0.02 mg Intravenous PRN Reese Spann NP        ondansetron injection 4 mg  4 mg Intravenous Q8H PRN Resee Spann NP        polyethylene glycol packet 17 g  17 g Oral Daily PRN Reese Spann, NP         promethazine tablet 25 mg  25 mg Oral Q6H PRN Reese Spann, JOSE MANUEL        simethicone chewable tablet 80 mg  1 tablet Oral QID PRN Reese Spann NP        sodium chloride 0.9% flush 3 mL  3 mL Intravenous Q12H PRN Reese Spann NP         Family History       Problem Relation (Age of Onset)    Heart disease Father    Seizures Sister          Tobacco Use    Smoking status: Never     Passive exposure: Never    Smokeless tobacco: Not on file   Substance and Sexual Activity    Alcohol use: Yes     Comment: OCC    Drug use: Never    Sexual activity: Yes     Review of Systems   Constitutional:  Negative for chills, diaphoresis, fatigue and fever.   Respiratory:  Negative for cough and shortness of breath.    Cardiovascular:  Negative for chest pain and leg swelling.   Gastrointestinal:  Positive for abdominal pain. Negative for nausea and vomiting.   All other systems reviewed and are negative.    Objective:     Vital Signs (Most Recent):  Temp: 98.6 °F (37 °C) (04/16/24 0751)  Pulse: 94 (04/16/24 0751)  Resp: 16 (04/16/24 0751)  BP: (!) 125/59 (04/16/24 0751)  SpO2: 100 % (04/16/24 0751) Vital Signs (24h Range):  Temp:  [97.6 °F (36.4 °C)-100 °F (37.8 °C)] 98.6 °F (37 °C)  Pulse:  [] 94  Resp:  [16-20] 16  SpO2:  [97 %-100 %] 100 %  BP: (105-137)/(51-83) 125/59     Weight: 97.7 kg (215 lb 7.3 oz)  Body mass index is 31.82 kg/m².     Physical Exam  Vitals and nursing note reviewed.   Constitutional:       General: She is awake. She is not in acute distress.     Appearance: Normal appearance. She is well-developed and well-groomed. She is not ill-appearing, toxic-appearing or diaphoretic.   HENT:      Head: Normocephalic and atraumatic.   Eyes:      Extraocular Movements: Extraocular movements intact.      Conjunctiva/sclera: Conjunctivae normal.   Cardiovascular:      Rate and Rhythm: Normal rate and regular rhythm.      Heart sounds: Normal heart sounds. No murmur heard.  Pulmonary:      Effort:  Pulmonary effort is normal.      Breath sounds: Normal breath sounds.   Abdominal:      General: Bowel sounds are normal.      Palpations: Abdomen is soft.      Tenderness: There is generalized abdominal tenderness.   Musculoskeletal:      Cervical back: Normal range of motion and neck supple.      Right lower leg: No edema.      Left lower leg: No edema.      Comments: 5/5 strength throughout   Skin:     General: Skin is warm and dry.      Capillary Refill: Capillary refill takes less than 2 seconds.   Neurological:      General: No focal deficit present.      Mental Status: She is alert and oriented to person, place, and time. Mental status is at baseline.      GCS: GCS eye subscore is 4. GCS verbal subscore is 5. GCS motor subscore is 6.      Cranial Nerves: Cranial nerves 2-12 are intact.      Sensory: Sensation is intact.   Psychiatric:         Mood and Affect: Mood normal.         Speech: Speech normal.         Behavior: Behavior normal. Behavior is cooperative.              LABS:  Recent Results (from the past 24 hour(s))   CBC auto differential    Collection Time: 04/15/24  1:46 PM   Result Value Ref Range    WBC 12.77 (H) 3.90 - 12.70 K/uL    RBC 4.68 4.00 - 5.40 M/uL    Hemoglobin 11.7 (L) 12.0 - 16.0 g/dL    Hematocrit 37.3 37.0 - 48.5 %    MCV 80 (L) 82 - 98 fL    MCH 25.0 (L) 27.0 - 31.0 pg    MCHC 31.4 (L) 32.0 - 36.0 g/dL    RDW 14.0 11.5 - 14.5 %    Platelets 605 (H) 150 - 450 K/uL    MPV 8.7 (L) 9.2 - 12.9 fL    Immature Granulocytes 0.4 0.0 - 0.5 %    Gran # (ANC) 9.2 (H) 1.8 - 7.7 K/uL    Immature Grans (Abs) 0.05 (H) 0.00 - 0.04 K/uL    Lymph # 2.6 1.0 - 4.8 K/uL    Mono # 0.8 0.3 - 1.0 K/uL    Eos # 0.1 0.0 - 0.5 K/uL    Baso # 0.04 0.00 - 0.20 K/uL    nRBC 0 0 /100 WBC    Gran % 72.4 38.0 - 73.0 %    Lymph % 20.0 18.0 - 48.0 %    Mono % 6.5 4.0 - 15.0 %    Eosinophil % 0.4 0.0 - 8.0 %    Basophil % 0.3 0.0 - 1.9 %    Differential Method Automated    Comprehensive metabolic panel    Collection  Time: 04/15/24  1:46 PM   Result Value Ref Range    Sodium 135 (L) 136 - 145 mmol/L    Potassium 3.9 3.5 - 5.1 mmol/L    Chloride 99 95 - 110 mmol/L    CO2 24 23 - 29 mmol/L    Glucose 85 70 - 110 mg/dL    BUN 13 6 - 20 mg/dL    Creatinine 1.0 0.5 - 1.4 mg/dL    Calcium 10.2 8.7 - 10.5 mg/dL    Total Protein 9.1 (H) 6.0 - 8.4 g/dL    Albumin 3.6 3.5 - 5.2 g/dL    Total Bilirubin 0.7 0.1 - 1.0 mg/dL    Alkaline Phosphatase 70 55 - 135 U/L    AST 14 10 - 40 U/L    ALT 25 10 - 44 U/L    eGFR >60 >60 mL/min/1.73 m^2    Anion Gap 12 8 - 16 mmol/L   Lipase    Collection Time: 04/15/24  1:46 PM   Result Value Ref Range    Lipase 23 4 - 60 U/L   Pregnancy, urine rapid (UPT)    Collection Time: 04/15/24  1:52 PM   Result Value Ref Range    Preg Test, Ur Negative        RADIOLOGY  CT Abdomen Pelvis With IV Contrast NO Oral Contrast    Result Date: 4/15/2024  EXAMINATION: CT ABDOMEN PELVIS WITH IV CONTRAST CLINICAL HISTORY: Abdominal pain, acute, nonlocalized; TECHNIQUE: Low dose axial images, sagittal and coronal reformations were obtained from the lung bases to the pubic symphysis following the IV administration of 100 mL of Omnipaque 350 and rectal contrast COMPARISON: Earlier imaging same date noncontrast     Again there is regional mural thickening, underlying diverticulosis surrounding stranding and mesenteric prominent lymph nodes at the distal descending colon.  No overt perforation.  Associated mesenteric phlegmon possible 2 cm.  Again findings may be inflammatory related to diverticulitis versus neoplastic. Scant ascites present. No obstructive bowel findings. No sizable liver lesion No adrenal gland mass Pancreas, spleen and kidneys stable unremarkable. Urinary bladder decompressed Electronically signed by: Marly Garcia Date:    04/15/2024 Time:    18:41    CT Abdomen Pelvis  Without Contrast    Result Date: 4/15/2024  EXAMINATION: CT ABDOMEN PELVIS WITHOUT CONTRAST CLINICAL HISTORY: Abdominal pain, acute,  nonlocalized; TECHNIQUE: Low dose axial images, sagittal and coronal reformations were obtained from the lung bases to the pubic symphysis.  30 mL of oral Omnipaque 350 was administered. COMPARISON: None FINDINGS: The lung bases are clear and heart size is normal. No gross parenchymal abnormalities are identified in the liver or spleen.  The pancreas, gallbladder and adrenals appear normal.  No parenchymal abnormality, intrarenal calculi or hydronephrosis are identified in either kidney.  There is a collection of free fluid visible in the left paracolic gutter extending into the pelvis associated with a segment of concentric wall thickening involving a segment of the mid to distal descending colon measuring approximately 8 cm in length consistent with diverticulitis or possible neoplasm.  There is haziness and stranding in the surrounding pericolic fat and multiple prominent mesenteric lymph nodes medial to the left colon in the abdomen and in the retroperitoneum in the left periaortic space.  Multiple colonic diverticula are visible projecting from the walls of the descending colon from the level of the splenic flexure through the distal segment.  No free air is visible to indicate evidence of perforation. Images obtained through the pelvis demonstrate no abnormality in the urinary bladder.  The uterus is retroverted but otherwise unremarkable for patient age.  The ovaries appear normal for patient age including in incidental 1.7 cm follicular cyst on the right.  Moderate dependent free pelvic fluid is felt to be associated with the process in the left colon.     There is a combination of concentric wall thickening and haziness and stranding in the surrounding fat involving a segment of the mid to distal descending colon measuring 8 cm in length.  Considerations include diverticulitis versus neoplasm.  No evidence of perforation is visible. There are numerous prominent mesenteric lymph nodes identified medial to  the left colon and in the retroperitoneum in the left para-aortic space.  This appearance is somewhat more suspicious for neoplasm.  Correlation with patient history and further evaluation by colonoscopy should be considered. Moderate collection of dependent free pelvic fluid likely associated with the process in the left colon. Incidental retroverted uterus. Electronically signed by: Todd Mitchell Date:    04/15/2024 Time:    15:57      EKG    MICROBIOLOGY    MDM     Amount and/or Complexity of Data Reviewed  Clinical lab tests: reviewed  Tests in the radiology section of CPT®: reviewed  Tests in the medicine section of CPT®: reviewed  Discussion of test results with the performing providers: yes  Decide to obtain previous medical records or to obtain history from someone other than the patient: yes  Obtain history from someone other than the patient: yes  Review and summarize past medical records: yes  Discuss the patient with other providers: yes  Independent visualization of images, tracings, or specimens: yes        Assessment/Plan:     * Generalized abdominal pain  Likely secondary to diverticulitis versus mesenteric phlegmon.  General surgery consulted.  IV antibiotics initiated in ED.  Plan:  -serial abdominal exams   -continue IV antibiotics   -repeat labs in a.m.  -general surgery consulted        VTE Risk Mitigation (From admission, onward)           Ordered     Reason for No Pharmacological VTE Prophylaxis  Once        Comments: Possible surgical procedure   Question:  Reasons:  Answer:  Physician Provided (leave comment)    04/15/24 2033     IP VTE HIGH RISK PATIENT  Once         04/15/24 2033     Place sequential compression device  Until discontinued         04/15/24 2033                     //Core Measures   -DVT proph: SCDs, withholding anticoagulation pending surgical intervention   -Code status Full    -Surrogate: none present      Components of this note were documented using a voice  recognition system and are subject to errors not corrected at the time the document was proof read. Please contact the author for any clarifications.          Reese Spann NP  Department of Hospital Medicine  [facility] - [facility]

## 2024-04-16 NOTE — CONSULTS
[facility]  Colorectal Surgery  Consult Note    Patient Name: Suri Hameed  MRN: [id number]  Code Status: Full Code  Admission Date: 4/15/2024  Hospital Length of Stay: 1 days  Attending Physician: Rashid Spann MD  Primary Care Provider: Tiera Primary Doctor    Patient information was obtained from patient, past medical records, and ER records.     Inpatient consult to General Surgery  Consult performed by: Chris Rosenthal MD  Consult ordered by: Reese Spann NP        Subjective:     Principal Problem: Generalized abdominal pain    History of Present Illness: 37yo F with a PMHx significant for asthma and sleep apnea  who presented to the emergency room for evaluation of diffuse abdominal pain over 2-3 days.  Patient states that her pain is located in the left lower quadrant and left flank.  Has previously had episodes of diverticulitis including requiring 1 admission in [place] for IV antibiotics but did not require any percutaneous drainage or surgical intervention.   Workup in the ER was concerning for leukocytosis of 12k and a CT scan concerning for a phlegmon near the descending colon concerning for diverticulitis versus malignancy. There was an associated phlegmon in the mesentery. She was admitted to the hospital medical service and surgery is consulted for evaluation.   She reports previously having diverticulitis requiring hospital admission in 2022 in [place] that resolved with IV antibiotics and bowel rest.  This was located in the sigmoid colon/left lower quadrant.  The pain this time as higher up than the previous episodes.  She reports having colonoscopy in [place], [place] in 2023 where diverticulosis was seen as well as 1 polyp was removed.  Denies any previous abdominal surgical history.  Denies any fever, chills, nausea, vomiting, hematochezia or melena.    Current Facility-Administered Medications   Medication Dose Route Frequency Provider Last Rate Last Admin    0.9%   NaCl infusion   Intravenous Continuous Reese Spann  mL/hr at 04/16/24 0343 New Bag at 04/16/24 0343    acetaminophen suppository 650 mg  650 mg Rectal Q4H PRN Reese Spann NP        acetaminophen tablet 650 mg  650 mg Oral Q8H PRN Reese Spann NP        aluminum-magnesium hydroxide-simethicone 200-200-20 mg/5 mL suspension 30 mL  30 mL Oral QID PRN Reese Spann NP        ciprofloxacin (CIPRO)400mg/200ml D5W IVPB 400 mg  400 mg Intravenous Q12H Lance Fields Jr.,  mL/hr at 04/16/24 0822 400 mg at 04/16/24 0822    dextrose 10% bolus 125 mL 125 mL  12.5 g Intravenous PRN Reese Spann NP        dextrose 10% bolus 250 mL 250 mL  25 g Intravenous PRN Reese Spann NP        glucagon (human recombinant) injection 1 mg  1 mg Intramuscular PRN Reese Spann NP        glucose chewable tablet 16 g  16 g Oral PRN Reese Spann NP        glucose chewable tablet 24 g  24 g Oral PRN Reese Spann NP        HYDROcodone-acetaminophen 5-325 mg per tablet 1 tablet  1 tablet Oral Q6H PRN Reese Spann NP   1 tablet at 04/16/24 0825    melatonin tablet 6 mg  6 mg Oral Nightly PRN Reese Spann NP        metronidazole IVPB 500 mg  500 mg Intravenous Q8H Reese Spann NP   Stopped at 04/16/24 0446    morphine injection 2 mg  2 mg Intravenous Q4H PRN Reese Spann NP        naloxone 0.4 mg/mL injection 0.02 mg  0.02 mg Intravenous PRN Reese Spann NP        ondansetron injection 4 mg  4 mg Intravenous Q8H PRN Reese Spann NP        polyethylene glycol packet 17 g  17 g Oral Daily PRN Reese Spann NP        promethazine tablet 25 mg  25 mg Oral Q6H PRN Reese Spann NP        simethicone chewable tablet 80 mg  1 tablet Oral QID PRN Alombro, Reese J, NP        sodium chloride 0.9% flush 3 mL  3 mL Intravenous Q12H PRN Reese Spann, NP           Review of patient's allergies indicates:  No Known  Allergies    Past Medical History:   Diagnosis Date    Mild intermittent asthma, uncomplicated     Sleep apnea      Past Surgical History:   Procedure Laterality Date    TONSILLECTOMY Bilateral 4/5/2024    Procedure: TONSILLECTOMY;  Surgeon: Juan M Cevallos MD;  Location: [facility];  Service: ENT;  Laterality: Bilateral;     Family History       Problem Relation (Age of Onset)    Heart disease Father    Seizures Sister          Tobacco Use    Smoking status: Never     Passive exposure: Never    Smokeless tobacco: Not on file   Substance and Sexual Activity    Alcohol use: Yes     Comment: OCC    Drug use: Never    Sexual activity: Yes     Review of Systems   Constitutional:  Negative for activity change, appetite change, chills, fatigue, fever and unexpected weight change.   HENT:  Negative for congestion, ear pain, sore throat and trouble swallowing.    Eyes:  Negative for pain, redness and itching.   Respiratory:  Negative for cough, shortness of breath and wheezing.    Cardiovascular:  Negative for chest pain, palpitations and leg swelling.   Gastrointestinal:  Positive for abdominal pain. Negative for abdominal distention, anal bleeding, blood in stool, nausea, rectal pain and vomiting.   Endocrine: Negative for cold intolerance, heat intolerance and polyuria.   Genitourinary:  Negative for dysuria, flank pain, frequency and hematuria.   Musculoskeletal:  Negative for gait problem, joint swelling and neck pain.   Skin:  Negative for color change, rash and wound.   Allergic/Immunologic: Negative for environmental allergies and immunocompromised state.   Neurological:  Negative for dizziness, speech difficulty, weakness and numbness.   Psychiatric/Behavioral:  Negative for agitation, confusion and hallucinations.      Objective:     Vital Signs (Most Recent):  Temp: 98.6 °F (37 °C) (04/16/24 0751)  Pulse: 95 (04/16/24 0913)  Resp: 18 (04/16/24 0913)  BP: (!) 125/59 (04/16/24 0751)  SpO2: 97 % (04/16/24 0913) Vital  Signs (24h Range):  Temp:  [97.6 °F (36.4 °C)-100 °F (37.8 °C)] 98.6 °F (37 °C)  Pulse:  [] 95  Resp:  [16-20] 18  SpO2:  [97 %-100 %] 97 %  BP: (105-137)/(51-83) 125/59     Weight: 97.7 kg (215 lb 7.3 oz)  Body mass index is 31.82 kg/m².     Physical Exam  Constitutional:       Appearance: She is well-developed.   HENT:      Head: Normocephalic and atraumatic.   Eyes:      Conjunctiva/sclera: Conjunctivae normal.   Neck:      Thyroid: No thyromegaly.   Cardiovascular:      Rate and Rhythm: Normal rate.   Pulmonary:      Effort: Pulmonary effort is normal. No respiratory distress.   Abdominal:      Comments: Soft, nondistended, +TTP L lateral abdomen >LLQ; no rebound or guarding   Musculoskeletal:         General: No tenderness. Normal range of motion.      Cervical back: Normal range of motion.   Skin:     General: Skin is warm and dry.      Capillary Refill: Capillary refill takes less than 2 seconds.      Findings: No rash.   Neurological:      Mental Status: She is alert and oriented to person, place, and time.            I have reviewed all pertinent lab results within the past 24 hours.  CBC:   Recent Labs   Lab 04/16/24  0911   WBC 9.61   RBC 3.51*   HGB 8.8*   HCT 27.8*      MCV 79*   MCH 25.1*   MCHC 31.7*     BMP:   Recent Labs   Lab 04/16/24  0911   *   *   K 3.9      CO2 22*   BUN 7   CREATININE 0.9   CALCIUM 8.7   MG 2.0     CMP:   Recent Labs   Lab 04/16/24  0911   *   CALCIUM 8.7   ALBUMIN 2.8*   PROT 6.3   *   K 3.9   CO2 22*      BUN 7   CREATININE 0.9   ALKPHOS 61   ALT 17   AST 12   BILITOT 0.6     LFTs:   Recent Labs   Lab 04/16/24  0911   ALT 17   AST 12   ALKPHOS 61   BILITOT 0.6   PROT 6.3   ALBUMIN 2.8*       Significant Diagnostics:  I have reviewed all pertinent imaging results/findings within the past 24 hours.    CT:  Impression:     Again there is regional mural thickening, underlying diverticulosis surrounding stranding and mesenteric  prominent lymph nodes at the distal descending colon.  No overt perforation.  Associated mesenteric phlegmon possible 2 cm.  Again findings may be inflammatory related to diverticulitis versus neoplastic.     Scant ascites present.     No obstructive bowel findings.     No sizable liver lesion     No adrenal gland mass     Pancreas, spleen and kidneys stable unremarkable.     Urinary bladder decompressed    Assessment/Plan:     * Generalized abdominal pain  39yo F with likely descending diverticulitis with mesenteric phlegmon    - No acute surgical intervention required at this time.  Given the previous history of diverticulitis as well as previous colonoscopy last year, I believe this to be more related to diverticular flare as opposed to malignancy.  Would treat with IV antibiotics and bowel rest and see if the pain and clinical condition improve.  If this is not improve with IV antibiotics and bowel rest, could require surgical intervention during this admission.  This was discussed with the patient and she is amenable to this plan.  - keep NPO for now, okay for sips of water and ice chips  - IVF  - IV abx  - OOB, ambulation encouraged    Sleep apnea  Care per hospital medicine    Uncomplicated asthma  Care per hospital medicine      VTE Risk Mitigation (From admission, onward)           Ordered     Reason for No Pharmacological VTE Prophylaxis  Once        Comments: Possible surgical procedure   Question:  Reasons:  Answer:  Physician Provided (leave comment)    04/15/24 2033     IP VTE HIGH RISK PATIENT  Once         04/15/24 2033     Place sequential compression device  Until discontinued         04/15/24 2033                    Thank you for your consult. I will follow-up with patient. Please contact us if you have any additional questions.    Chris Rosenthal MD  Colorectal Surgery  [facility]

## 2024-04-16 NOTE — SUBJECTIVE & OBJECTIVE
Current Facility-Administered Medications   Medication Dose Route Frequency Provider Last Rate Last Admin    0.9%  NaCl infusion   Intravenous Continuous Reese Spann  mL/hr at 04/16/24 0343 New Bag at 04/16/24 0343    acetaminophen suppository 650 mg  650 mg Rectal Q4H PRN Reese Spann NP        acetaminophen tablet 650 mg  650 mg Oral Q8H PRN Reese Spann NP        aluminum-magnesium hydroxide-simethicone 200-200-20 mg/5 mL suspension 30 mL  30 mL Oral QID PRN Reese Spann NP        ciprofloxacin (CIPRO)400mg/200ml D5W IVPB 400 mg  400 mg Intravenous Q12H Lance Fields Jr.,  mL/hr at 04/16/24 0822 400 mg at 04/16/24 0822    dextrose 10% bolus 125 mL 125 mL  12.5 g Intravenous PRN Reese Spann NP        dextrose 10% bolus 250 mL 250 mL  25 g Intravenous PRN Reese Spann NP        glucagon (human recombinant) injection 1 mg  1 mg Intramuscular PRN Reese Spann NP        glucose chewable tablet 16 g  16 g Oral PRN Reese Spann NP        glucose chewable tablet 24 g  24 g Oral PRN Reese Spann NP        HYDROcodone-acetaminophen 5-325 mg per tablet 1 tablet  1 tablet Oral Q6H PRN Reese Spann NP   1 tablet at 04/16/24 0825    melatonin tablet 6 mg  6 mg Oral Nightly PRN Reese Spann NP        metronidazole IVPB 500 mg  500 mg Intravenous Q8H Reese Spann NP   Stopped at 04/16/24 0446    morphine injection 2 mg  2 mg Intravenous Q4H PRN Reese Spann NP        naloxone 0.4 mg/mL injection 0.02 mg  0.02 mg Intravenous PRN Reese Spann NP        ondansetron injection 4 mg  4 mg Intravenous Q8H PRN Reese Spann NP        polyethylene glycol packet 17 g  17 g Oral Daily PRN Reese Spann NP        promethazine tablet 25 mg  25 mg Oral Q6H PRN Alombro, Reese J, NP        simethicone chewable tablet 80 mg  1 tablet Oral QID PRN Reese Spann, NP        sodium chloride 0.9% flush 3 mL  3 mL  Intravenous Q12H PRN Reese Spann NP           Review of patient's allergies indicates:  No Known Allergies    Past Medical History:   Diagnosis Date    Mild intermittent asthma, uncomplicated     Sleep apnea      Past Surgical History:   Procedure Laterality Date    TONSILLECTOMY Bilateral 4/5/2024    Procedure: TONSILLECTOMY;  Surgeon: Juan M Cevallos MD;  Location: [facility];  Service: ENT;  Laterality: Bilateral;     Family History       Problem Relation (Age of Onset)    Heart disease Father    Seizures Sister          Tobacco Use    Smoking status: Never     Passive exposure: Never    Smokeless tobacco: Not on file   Substance and Sexual Activity    Alcohol use: Yes     Comment: OCC    Drug use: Never    Sexual activity: Yes     Review of Systems   Constitutional:  Negative for activity change, appetite change, chills, fatigue, fever and unexpected weight change.   HENT:  Negative for congestion, ear pain, sore throat and trouble swallowing.    Eyes:  Negative for pain, redness and itching.   Respiratory:  Negative for cough, shortness of breath and wheezing.    Cardiovascular:  Negative for chest pain, palpitations and leg swelling.   Gastrointestinal:  Positive for abdominal pain. Negative for abdominal distention, anal bleeding, blood in stool, nausea, rectal pain and vomiting.   Endocrine: Negative for cold intolerance, heat intolerance and polyuria.   Genitourinary:  Negative for dysuria, flank pain, frequency and hematuria.   Musculoskeletal:  Negative for gait problem, joint swelling and neck pain.   Skin:  Negative for color change, rash and wound.   Allergic/Immunologic: Negative for environmental allergies and immunocompromised state.   Neurological:  Negative for dizziness, speech difficulty, weakness and numbness.   Psychiatric/Behavioral:  Negative for agitation, confusion and hallucinations.      Objective:     Vital Signs (Most Recent):  Temp: 98.6 °F (37 °C) (04/16/24 0751)  Pulse: 95  (04/16/24 0913)  Resp: 18 (04/16/24 0913)  BP: (!) 125/59 (04/16/24 0751)  SpO2: 97 % (04/16/24 0913) Vital Signs (24h Range):  Temp:  [97.6 °F (36.4 °C)-100 °F (37.8 °C)] 98.6 °F (37 °C)  Pulse:  [] 95  Resp:  [16-20] 18  SpO2:  [97 %-100 %] 97 %  BP: (105-137)/(51-83) 125/59     Weight: 97.7 kg (215 lb 7.3 oz)  Body mass index is 31.82 kg/m².     Physical Exam  Constitutional:       Appearance: She is well-developed.   HENT:      Head: Normocephalic and atraumatic.   Eyes:      Conjunctiva/sclera: Conjunctivae normal.   Neck:      Thyroid: No thyromegaly.   Cardiovascular:      Rate and Rhythm: Normal rate.   Pulmonary:      Effort: Pulmonary effort is normal. No respiratory distress.   Abdominal:      Comments: Soft, nondistended, +TTP L lateral abdomen >LLQ; no rebound or guarding   Musculoskeletal:         General: No tenderness. Normal range of motion.      Cervical back: Normal range of motion.   Skin:     General: Skin is warm and dry.      Capillary Refill: Capillary refill takes less than 2 seconds.      Findings: No rash.   Neurological:      Mental Status: She is alert and oriented to person, place, and time.            I have reviewed all pertinent lab results within the past 24 hours.  CBC:   Recent Labs   Lab 04/16/24 0911   WBC 9.61   RBC 3.51*   HGB 8.8*   HCT 27.8*      MCV 79*   MCH 25.1*   MCHC 31.7*     BMP:   Recent Labs   Lab 04/16/24 0911   *   *   K 3.9      CO2 22*   BUN 7   CREATININE 0.9   CALCIUM 8.7   MG 2.0     CMP:   Recent Labs   Lab 04/16/24 0911   *   CALCIUM 8.7   ALBUMIN 2.8*   PROT 6.3   *   K 3.9   CO2 22*      BUN 7   CREATININE 0.9   ALKPHOS 61   ALT 17   AST 12   BILITOT 0.6     LFTs:   Recent Labs   Lab 04/16/24 0911   ALT 17   AST 12   ALKPHOS 61   BILITOT 0.6   PROT 6.3   ALBUMIN 2.8*       Significant Diagnostics:  I have reviewed all pertinent imaging results/findings within the past 24 hours.    CT:  Impression:      Again there is regional mural thickening, underlying diverticulosis surrounding stranding and mesenteric prominent lymph nodes at the distal descending colon.  No overt perforation.  Associated mesenteric phlegmon possible 2 cm.  Again findings may be inflammatory related to diverticulitis versus neoplastic.     Scant ascites present.     No obstructive bowel findings.     No sizable liver lesion     No adrenal gland mass     Pancreas, spleen and kidneys stable unremarkable.     Urinary bladder decompressed

## 2024-04-16 NOTE — ASSESSMENT & PLAN NOTE
Likely secondary to diverticulitis versus mesenteric phlegmon.  General surgery consulted.  IV antibiotics initiated in ED.  Plan:  -serial abdominal exams   -continue IV antibiotics   -repeat labs in a.m.  -general surgery following

## 2024-04-16 NOTE — PROGRESS NOTES
[facility]  Hospital Medicine  Progress Note    Patient Name: Suri Hameed  MRN: [id number]  Patient Class: IP- Inpatient   Admission Date: 4/15/2024  Length of Stay: 1 days  Attending Physician: Gustavo Navarro MD  Primary Care Provider: Neelam Benjamin        Subjective:     Principal Problem:Diverticulitis        HPI:  Suri Hameed is a 38 y.o. female with a PMH  has a past medical history of Mild intermittent asthma, uncomplicated and Sleep apnea.  Presented to the ER for diffuse abdominal pain gradually worsened over the last few days.  Patient states her pain is more prominent in the left lower quadrant.  Denies any suspicious food intake, [place] illnesses, or sick contacts.  Denies any associated fever, chills, sweats, nausea, vomiting, bladder/bowel complaints, or any other symptoms at this time.    ER workup revealed leukocytosis of 12.77, H/H of 11.7/37.3, and CT scan demonstrated:[Again there is regional mural thickening, underlying diverticulosis surrounding stranding and mesenteric prominent lymph nodes at the distal descending colon. No overt perforation. Associated mesenteric phlegmon possible 2 cm. Again findings may be inflammatory related to diverticulitis versus neoplastic].  ER provider discussed findings with general surgeon on-call (Dr. Rosenthal) who recommended hospital Medicine to admit for IV antibiotics and serial abdominal exams.  Will see patient was consult in a.m. patient in agreement with treatment plan.  Patient will be admitted under inpatient status.    PCP: No, Primary Doctor      Overview/Hospital Course:  4/16  Admitted for diverticulitis with mesenteric phlegmon  On IV antibiotics, IV fluids, bowel rest  Surgery following  Pain currently 3/10, LLQ, improving  Reports flatus, no BM      Review of Systems   All other systems reviewed and are negative.    Objective:     Vital Signs (Most Recent):  Temp: 98.6 °F (37 °C) (04/16/24 0751)  Pulse: 95 (04/16/24  0913)  Resp: 15 (04/16/24 1533)  BP: (!) 125/59 (04/16/24 0751)  SpO2: 97 % (04/16/24 0913) Vital Signs (24h Range):  Temp:  [97.6 °F (36.4 °C)-100 °F (37.8 °C)] 98.6 °F (37 °C)  Pulse:  [] 95  Resp:  [15-20] 15  SpO2:  [97 %-100 %] 97 %  BP: (105-125)/(51-60) 125/59     Weight: 97.7 kg (215 lb 7.3 oz)  Body mass index is 31.82 kg/m².    Intake/Output Summary (Last 24 hours) at 4/16/2024 1625  Last data filed at 4/16/2024 0425  Gross per 24 hour   Intake 0 ml   Output --   Net 0 ml         Physical Exam  Vitals and nursing note reviewed.   Constitutional:       General: She is not in acute distress.     Appearance: Normal appearance. She is normal weight.   Cardiovascular:      Rate and Rhythm: Normal rate and regular rhythm.      Heart sounds: No murmur heard.  Pulmonary:      Effort: Pulmonary effort is normal. No respiratory distress.      Breath sounds: No wheezing.   Abdominal:      Tenderness: There is abdominal tenderness.   Neurological:      General: No focal deficit present.      Mental Status: She is alert and oriented to person, place, and time.   Psychiatric:         Mood and Affect: Mood normal.         Behavior: Behavior normal.             Significant Labs: All pertinent labs within the past 24 hours have been reviewed.  Recent Lab Results         04/16/24  0911        Albumin 2.8       ALP 61       ALT 17       Anion Gap 10       AST 12       Baso # 0.03       Basophil % 0.3       Bilirubin Direct 0.3       BILIRUBIN TOTAL 0.6  Comment: For infants and newborns, interpretation of results should be based  on gestational age, weight and in agreement with clinical  observations.    Premature Infant recommended reference ranges:  Up to 24 hours.............<8.0 mg/dL  Up to 48 hours............<12.0 mg/dL  3-5 days..................<15.0 mg/dL  6-29 days.................<15.0 mg/dL         BUN 7       Calcium 8.7       Chloride 102       CO2 22       Creatinine 0.9       Differential Method  Automated       eGFR >60       Eos # 0.1       Eos % 0.6       Glucose 143       Gran # (ANC) 6.3       Gran % 65.7       Hematocrit 27.8       Hemoglobin 8.8       Immature Grans (Abs) 0.04  Comment: Mild elevation in immature granulocytes is non specific and   can be seen in a variety of conditions including stress response,   acute inflammation, trauma and pregnancy. Correlation with other   laboratory and clinical findings is essential.         Immature Granulocytes 0.4       Lymph # 2.6       Lymph % 27.2       Magnesium  2.0       MCH 25.1       MCHC 31.7       MCV 79       Mono # 0.6       Mono % 5.8       MPV 8.6       nRBC 0       Phosphorus Level 2.7       Platelet Count 439       Potassium 3.9       PROTEIN TOTAL 6.3       RBC 3.51       RDW 13.7       Sodium 134       WBC 9.61               Significant Imaging: I have reviewed all pertinent imaging results/findings within the past 24 hours.    CT Abdomen Pelvis With IV Contrast NO Oral Contrast   Final Result      Again there is regional mural thickening, underlying diverticulosis surrounding stranding and mesenteric prominent lymph nodes at the distal descending colon.  No overt perforation.  Associated mesenteric phlegmon possible 2 cm.  Again findings may be inflammatory related to diverticulitis versus neoplastic.      Scant ascites present.      No obstructive bowel findings.      No sizable liver lesion      No adrenal gland mass      Pancreas, spleen and kidneys stable unremarkable.      Urinary bladder decompressed         Electronically signed by: Marly Garcia   Date:    04/15/2024   Time:    18:41      CT Abdomen Pelvis  Without Contrast   Final Result      There is a combination of concentric wall thickening and haziness and stranding in the surrounding fat involving a segment of the mid to distal descending colon measuring 8 cm in length.  Considerations include diverticulitis versus neoplasm.  No evidence of perforation is visible.       There are numerous prominent mesenteric lymph nodes identified medial to the left colon and in the retroperitoneum in the left para-aortic space.  This appearance is somewhat more suspicious for neoplasm.  Correlation with patient history and further evaluation by colonoscopy should be considered.      Moderate collection of dependent free pelvic fluid likely associated with the process in the left colon.      Incidental retroverted uterus.         Electronically signed by: Todd Mitchell   Date:    04/15/2024   Time:    15:57            Assessment/Plan:      * Diverticulitis  Likely secondary to diverticulitis versus mesenteric phlegmon.  General surgery consulted.  IV antibiotics initiated in ED.  Plan:  -serial abdominal exams   -continue IV antibiotics   -repeat labs in a.m.  -general surgery following        VTE Risk Mitigation (From admission, onward)           Ordered     Reason for No Pharmacological VTE Prophylaxis  Once        Comments: Possible surgical procedure   Question:  Reasons:  Answer:  Physician Provided (leave comment)    04/15/24 2033     IP VTE HIGH RISK PATIENT  Once         04/15/24 2033     Place sequential compression device  Until discontinued         04/15/24 2033                    Discharge Planning   ANATOLIY:      Code Status: Full Code   Is the patient medically ready for discharge?:     Reason for patient still in hospital (select all that apply): Patient trending condition, Laboratory test, Treatment, and Consult recommendations  Discharge Plan A: Home with family                  Gustavo Navarro MD  Department of Hospital Medicine   [facility]

## 2024-04-16 NOTE — SUBJECTIVE & OBJECTIVE
Review of Systems   All other systems reviewed and are negative.    Objective:     Vital Signs (Most Recent):  Temp: 98.6 °F (37 °C) (04/16/24 0751)  Pulse: 95 (04/16/24 0913)  Resp: 15 (04/16/24 1533)  BP: (!) 125/59 (04/16/24 0751)  SpO2: 97 % (04/16/24 0913) Vital Signs (24h Range):  Temp:  [97.6 °F (36.4 °C)-100 °F (37.8 °C)] 98.6 °F (37 °C)  Pulse:  [] 95  Resp:  [15-20] 15  SpO2:  [97 %-100 %] 97 %  BP: (105-125)/(51-60) 125/59     Weight: 97.7 kg (215 lb 7.3 oz)  Body mass index is 31.82 kg/m².    Intake/Output Summary (Last 24 hours) at 4/16/2024 1625  Last data filed at 4/16/2024 0425  Gross per 24 hour   Intake 0 ml   Output --   Net 0 ml         Physical Exam  Vitals and nursing note reviewed.   Constitutional:       General: She is not in acute distress.     Appearance: Normal appearance. She is normal weight.   Cardiovascular:      Rate and Rhythm: Normal rate and regular rhythm.      Heart sounds: No murmur heard.  Pulmonary:      Effort: Pulmonary effort is normal. No respiratory distress.      Breath sounds: No wheezing.   Abdominal:      Tenderness: There is abdominal tenderness.   Neurological:      General: No focal deficit present.      Mental Status: She is alert and oriented to person, place, and time.   Psychiatric:         Mood and Affect: Mood normal.         Behavior: Behavior normal.             Significant Labs: All pertinent labs within the past 24 hours have been reviewed.  Recent Lab Results         04/16/24  0911        Albumin 2.8       ALP 61       ALT 17       Anion Gap 10       AST 12       Baso # 0.03       Basophil % 0.3       Bilirubin Direct 0.3       BILIRUBIN TOTAL 0.6  Comment: For infants and newborns, interpretation of results should be based  on gestational age, weight and in agreement with clinical  observations.    Premature Infant recommended reference ranges:  Up to 24 hours.............<8.0 mg/dL  Up to 48 hours............<12.0 mg/dL  3-5  days..................<15.0 mg/dL  6-29 days.................<15.0 mg/dL         BUN 7       Calcium 8.7       Chloride 102       CO2 22       Creatinine 0.9       Differential Method Automated       eGFR >60       Eos # 0.1       Eos % 0.6       Glucose 143       Gran # (ANC) 6.3       Gran % 65.7       Hematocrit 27.8       Hemoglobin 8.8       Immature Grans (Abs) 0.04  Comment: Mild elevation in immature granulocytes is non specific and   can be seen in a variety of conditions including stress response,   acute inflammation, trauma and pregnancy. Correlation with other   laboratory and clinical findings is essential.         Immature Granulocytes 0.4       Lymph # 2.6       Lymph % 27.2       Magnesium  2.0       MCH 25.1       MCHC 31.7       MCV 79       Mono # 0.6       Mono % 5.8       MPV 8.6       nRBC 0       Phosphorus Level 2.7       Platelet Count 439       Potassium 3.9       PROTEIN TOTAL 6.3       RBC 3.51       RDW 13.7       Sodium 134       WBC 9.61               Significant Imaging: I have reviewed all pertinent imaging results/findings within the past 24 hours.    CT Abdomen Pelvis With IV Contrast NO Oral Contrast   Final Result      Again there is regional mural thickening, underlying diverticulosis surrounding stranding and mesenteric prominent lymph nodes at the distal descending colon.  No overt perforation.  Associated mesenteric phlegmon possible 2 cm.  Again findings may be inflammatory related to diverticulitis versus neoplastic.      Scant ascites present.      No obstructive bowel findings.      No sizable liver lesion      No adrenal gland mass      Pancreas, spleen and kidneys stable unremarkable.      Urinary bladder decompressed         Electronically signed by: Marly Garcia   Date:    04/15/2024   Time:    18:41      CT Abdomen Pelvis  Without Contrast   Final Result      There is a combination of concentric wall thickening and haziness and stranding in the surrounding  fat involving a segment of the mid to distal descending colon measuring 8 cm in length.  Considerations include diverticulitis versus neoplasm.  No evidence of perforation is visible.      There are numerous prominent mesenteric lymph nodes identified medial to the left colon and in the retroperitoneum in the left para-aortic space.  This appearance is somewhat more suspicious for neoplasm.  Correlation with patient history and further evaluation by colonoscopy should be considered.      Moderate collection of dependent free pelvic fluid likely associated with the process in the left colon.      Incidental retroverted uterus.         Electronically signed by: Todd Mitchell   Date:    04/15/2024   Time:    15:57

## 2024-04-16 NOTE — HPI
Suri Hameed is a 38 y.o. female with a PMH  has a past medical history of Mild intermittent asthma, uncomplicated and Sleep apnea.  Presented to the ER for diffuse abdominal pain gradually worsened over the last few days.  Patient states her pain is more prominent in the left lower quadrant.  Denies any suspicious food intake, [place] illnesses, or sick contacts.  Denies any associated fever, chills, sweats, nausea, vomiting, bladder/bowel complaints, or any other symptoms at this time.    ER workup revealed leukocytosis of 12.77, H/H of 11.7/37.3, and CT scan demonstrated:[Again there is regional mural thickening, underlying diverticulosis surrounding stranding and mesenteric prominent lymph nodes at the distal descending colon. No overt perforation. Associated mesenteric phlegmon possible 2 cm. Again findings may be inflammatory related to diverticulitis versus neoplastic].  ER provider discussed findings with general surgeon on-call (Dr. Rosenthal) who recommended hospital Medicine to admit for IV antibiotics and serial abdominal exams.  Will see patient was consult in a.m. patient in agreement with treatment plan.  Patient will be admitted under inpatient status.    PCP: No, Primary Doctor

## 2024-04-16 NOTE — SUBJECTIVE & OBJECTIVE
Past Medical History:   Diagnosis Date    Mild intermittent asthma, uncomplicated     Sleep apnea        Past Surgical History:   Procedure Laterality Date    TONSILLECTOMY Bilateral 4/5/2024    Procedure: TONSILLECTOMY;  Surgeon: Juan M Cevallos MD;  Location: [facility];  Service: ENT;  Laterality: Bilateral;       Review of patient's allergies indicates:  No Known Allergies    Current Facility-Administered Medications   Medication Dose Route Frequency Provider Last Rate Last Admin    0.9%  NaCl infusion   Intravenous Continuous Reese Spann  mL/hr at 04/16/24 0343 New Bag at 04/16/24 0343    acetaminophen suppository 650 mg  650 mg Rectal Q4H PRN Reese Spann NP        acetaminophen tablet 650 mg  650 mg Oral Q8H PRN Reese Spann NP        aluminum-magnesium hydroxide-simethicone 200-200-20 mg/5 mL suspension 30 mL  30 mL Oral QID PRN Reese Spann NP        ciprofloxacin (CIPRO)400mg/200ml D5W IVPB 400 mg  400 mg Intravenous Q12H Lance Fields Jr., FNP   Stopped at 04/15/24 2202    dextrose 10% bolus 125 mL 125 mL  12.5 g Intravenous PRN Reese Spann NP        dextrose 10% bolus 250 mL 250 mL  25 g Intravenous PRN Reese Spann NP        glucagon (human recombinant) injection 1 mg  1 mg Intramuscular PRN Reese Spann NP        glucose chewable tablet 16 g  16 g Oral PRN Reese Spann NP        glucose chewable tablet 24 g  24 g Oral PRN Reese Spann NP        HYDROcodone-acetaminophen 5-325 mg per tablet 1 tablet  1 tablet Oral Q6H PRN Reese Spann NP        melatonin tablet 6 mg  6 mg Oral Nightly PRN Reese Spann NP        metronidazole IVPB 500 mg  500 mg Intravenous Q8H Reese Spann NP   Stopped at 04/16/24 0446    morphine injection 2 mg  2 mg Intravenous Q4H PRN Reese Spann NP        naloxone 0.4 mg/mL injection 0.02 mg  0.02 mg Intravenous PRN Reese Spann NP        ondansetron injection 4 mg  4 mg  Intravenous Q8H PRN Reese Spann NP        polyethylene glycol packet 17 g  17 g Oral Daily PRN Reese Spann NP        promethazine tablet 25 mg  25 mg Oral Q6H PRN Reese Spann NP        simethicone chewable tablet 80 mg  1 tablet Oral QID PRN Reese Spann NP        sodium chloride 0.9% flush 3 mL  3 mL Intravenous Q12H PRN Reese Spann NP         Family History       Problem Relation (Age of Onset)    Heart disease Father    Seizures Sister          Tobacco Use    Smoking status: Never     Passive exposure: Never    Smokeless tobacco: Not on file   Substance and Sexual Activity    Alcohol use: Yes     Comment: OCC    Drug use: Never    Sexual activity: Yes     Review of Systems   Constitutional:  Negative for chills, diaphoresis, fatigue and fever.   Respiratory:  Negative for cough and shortness of breath.    Cardiovascular:  Negative for chest pain and leg swelling.   Gastrointestinal:  Positive for abdominal pain. Negative for nausea and vomiting.   All other systems reviewed and are negative.    Objective:     Vital Signs (Most Recent):  Temp: 98.6 °F (37 °C) (04/16/24 0751)  Pulse: 94 (04/16/24 0751)  Resp: 16 (04/16/24 0751)  BP: (!) 125/59 (04/16/24 0751)  SpO2: 100 % (04/16/24 0751) Vital Signs (24h Range):  Temp:  [97.6 °F (36.4 °C)-100 °F (37.8 °C)] 98.6 °F (37 °C)  Pulse:  [] 94  Resp:  [16-20] 16  SpO2:  [97 %-100 %] 100 %  BP: (105-137)/(51-83) 125/59     Weight: 97.7 kg (215 lb 7.3 oz)  Body mass index is 31.82 kg/m².     Physical Exam  Vitals and nursing note reviewed.   Constitutional:       General: She is awake. She is not in acute distress.     Appearance: Normal appearance. She is well-developed and well-groomed. She is not ill-appearing, toxic-appearing or diaphoretic.   HENT:      Head: Normocephalic and atraumatic.   Eyes:      Extraocular Movements: Extraocular movements intact.      Conjunctiva/sclera: Conjunctivae normal.   Cardiovascular:       Rate and Rhythm: Normal rate and regular rhythm.      Heart sounds: Normal heart sounds. No murmur heard.  Pulmonary:      Effort: Pulmonary effort is normal.      Breath sounds: Normal breath sounds.   Abdominal:      General: Bowel sounds are normal.      Palpations: Abdomen is soft.      Tenderness: There is generalized abdominal tenderness.   Musculoskeletal:      Cervical back: Normal range of motion and neck supple.      Right lower leg: No edema.      Left lower leg: No edema.      Comments: 5/5 strength throughout   Skin:     General: Skin is warm and dry.      Capillary Refill: Capillary refill takes less than 2 seconds.   Neurological:      General: No focal deficit present.      Mental Status: She is alert and oriented to person, place, and time. Mental status is at baseline.      GCS: GCS eye subscore is 4. GCS verbal subscore is 5. GCS motor subscore is 6.      Cranial Nerves: Cranial nerves 2-12 are intact.      Sensory: Sensation is intact.   Psychiatric:         Mood and Affect: Mood normal.         Speech: Speech normal.         Behavior: Behavior normal. Behavior is cooperative.              LABS:  Recent Results (from the past 24 hour(s))   CBC auto differential    Collection Time: 04/15/24  1:46 PM   Result Value Ref Range    WBC 12.77 (H) 3.90 - 12.70 K/uL    RBC 4.68 4.00 - 5.40 M/uL    Hemoglobin 11.7 (L) 12.0 - 16.0 g/dL    Hematocrit 37.3 37.0 - 48.5 %    MCV 80 (L) 82 - 98 fL    MCH 25.0 (L) 27.0 - 31.0 pg    MCHC 31.4 (L) 32.0 - 36.0 g/dL    RDW 14.0 11.5 - 14.5 %    Platelets 605 (H) 150 - 450 K/uL    MPV 8.7 (L) 9.2 - 12.9 fL    Immature Granulocytes 0.4 0.0 - 0.5 %    Gran # (ANC) 9.2 (H) 1.8 - 7.7 K/uL    Immature Grans (Abs) 0.05 (H) 0.00 - 0.04 K/uL    Lymph # 2.6 1.0 - 4.8 K/uL    Mono # 0.8 0.3 - 1.0 K/uL    Eos # 0.1 0.0 - 0.5 K/uL    Baso # 0.04 0.00 - 0.20 K/uL    nRBC 0 0 /100 WBC    Gran % 72.4 38.0 - 73.0 %    Lymph % 20.0 18.0 - 48.0 %    Mono % 6.5 4.0 - 15.0 %     Eosinophil % 0.4 0.0 - 8.0 %    Basophil % 0.3 0.0 - 1.9 %    Differential Method Automated    Comprehensive metabolic panel    Collection Time: 04/15/24  1:46 PM   Result Value Ref Range    Sodium 135 (L) 136 - 145 mmol/L    Potassium 3.9 3.5 - 5.1 mmol/L    Chloride 99 95 - 110 mmol/L    CO2 24 23 - 29 mmol/L    Glucose 85 70 - 110 mg/dL    BUN 13 6 - 20 mg/dL    Creatinine 1.0 0.5 - 1.4 mg/dL    Calcium 10.2 8.7 - 10.5 mg/dL    Total Protein 9.1 (H) 6.0 - 8.4 g/dL    Albumin 3.6 3.5 - 5.2 g/dL    Total Bilirubin 0.7 0.1 - 1.0 mg/dL    Alkaline Phosphatase 70 55 - 135 U/L    AST 14 10 - 40 U/L    ALT 25 10 - 44 U/L    eGFR >60 >60 mL/min/1.73 m^2    Anion Gap 12 8 - 16 mmol/L   Lipase    Collection Time: 04/15/24  1:46 PM   Result Value Ref Range    Lipase 23 4 - 60 U/L   Pregnancy, urine rapid (UPT)    Collection Time: 04/15/24  1:52 PM   Result Value Ref Range    Preg Test, Ur Negative        RADIOLOGY  CT Abdomen Pelvis With IV Contrast NO Oral Contrast    Result Date: 4/15/2024  EXAMINATION: CT ABDOMEN PELVIS WITH IV CONTRAST CLINICAL HISTORY: Abdominal pain, acute, nonlocalized; TECHNIQUE: Low dose axial images, sagittal and coronal reformations were obtained from the lung bases to the pubic symphysis following the IV administration of 100 mL of Omnipaque 350 and rectal contrast COMPARISON: Earlier imaging same date noncontrast     Again there is regional mural thickening, underlying diverticulosis surrounding stranding and mesenteric prominent lymph nodes at the distal descending colon.  No overt perforation.  Associated mesenteric phlegmon possible 2 cm.  Again findings may be inflammatory related to diverticulitis versus neoplastic. Scant ascites present. No obstructive bowel findings. No sizable liver lesion No adrenal gland mass Pancreas, spleen and kidneys stable unremarkable. Urinary bladder decompressed Electronically signed by: Marly Garcia Date:    04/15/2024 Time:    18:41    CT Abdomen  Pelvis  Without Contrast    Result Date: 4/15/2024  EXAMINATION: CT ABDOMEN PELVIS WITHOUT CONTRAST CLINICAL HISTORY: Abdominal pain, acute, nonlocalized; TECHNIQUE: Low dose axial images, sagittal and coronal reformations were obtained from the lung bases to the pubic symphysis.  30 mL of oral Omnipaque 350 was administered. COMPARISON: None FINDINGS: The lung bases are clear and heart size is normal. No gross parenchymal abnormalities are identified in the liver or spleen.  The pancreas, gallbladder and adrenals appear normal.  No parenchymal abnormality, intrarenal calculi or hydronephrosis are identified in either kidney.  There is a collection of free fluid visible in the left paracolic gutter extending into the pelvis associated with a segment of concentric wall thickening involving a segment of the mid to distal descending colon measuring approximately 8 cm in length consistent with diverticulitis or possible neoplasm.  There is haziness and stranding in the surrounding pericolic fat and multiple prominent mesenteric lymph nodes medial to the left colon in the abdomen and in the retroperitoneum in the left periaortic space.  Multiple colonic diverticula are visible projecting from the walls of the descending colon from the level of the splenic flexure through the distal segment.  No free air is visible to indicate evidence of perforation. Images obtained through the pelvis demonstrate no abnormality in the urinary bladder.  The uterus is retroverted but otherwise unremarkable for patient age.  The ovaries appear normal for patient age including in incidental 1.7 cm follicular cyst on the right.  Moderate dependent free pelvic fluid is felt to be associated with the process in the left colon.     There is a combination of concentric wall thickening and haziness and stranding in the surrounding fat involving a segment of the mid to distal descending colon measuring 8 cm in length.  Considerations include  diverticulitis versus neoplasm.  No evidence of perforation is visible. There are numerous prominent mesenteric lymph nodes identified medial to the left colon and in the retroperitoneum in the left para-aortic space.  This appearance is somewhat more suspicious for neoplasm.  Correlation with patient history and further evaluation by colonoscopy should be considered. Moderate collection of dependent free pelvic fluid likely associated with the process in the left colon. Incidental retroverted uterus. Electronically signed by: Todd Mitchell Date:    04/15/2024 Time:    15:57      EKG    MICROBIOLOGY    MDM     Amount and/or Complexity of Data Reviewed  Clinical lab tests: reviewed  Tests in the radiology section of CPT®: reviewed  Tests in the medicine section of CPT®: reviewed  Discussion of test results with the performing providers: yes  Decide to obtain previous medical records or to obtain history from someone other than the patient: yes  Obtain history from someone other than the patient: yes  Review and summarize past medical records: yes  Discuss the patient with other providers: yes  Independent visualization of images, tracings, or specimens: yes

## 2024-04-17 VITALS
BODY MASS INDEX: 31.91 KG/M2 | HEART RATE: 78 BPM | RESPIRATION RATE: 18 BRPM | HEIGHT: 69 IN | TEMPERATURE: 98 F | WEIGHT: 215.44 LBS | DIASTOLIC BLOOD PRESSURE: 56 MMHG | OXYGEN SATURATION: 99 % | SYSTOLIC BLOOD PRESSURE: 105 MMHG

## 2024-04-17 LAB
ALBUMIN SERPL BCP-MCNC: 2.7 G/DL (ref 3.5–5.2)
ALP SERPL-CCNC: 67 U/L (ref 55–135)
ALT SERPL W/O P-5'-P-CCNC: 20 U/L (ref 10–44)
ANION GAP SERPL CALC-SCNC: 9 MMOL/L (ref 8–16)
AST SERPL-CCNC: 18 U/L (ref 10–40)
BASOPHILS # BLD AUTO: 0.02 K/UL (ref 0–0.2)
BASOPHILS NFR BLD: 0.2 % (ref 0–1.9)
BILIRUB DIRECT SERPL-MCNC: 0.2 MG/DL (ref 0.1–0.3)
BILIRUB SERPL-MCNC: 0.5 MG/DL (ref 0.1–1)
BUN SERPL-MCNC: 5 MG/DL (ref 6–20)
CALCIUM SERPL-MCNC: 8.9 MG/DL (ref 8.7–10.5)
CHLORIDE SERPL-SCNC: 101 MMOL/L (ref 95–110)
CO2 SERPL-SCNC: 22 MMOL/L (ref 23–29)
CREAT SERPL-MCNC: 0.8 MG/DL (ref 0.5–1.4)
DIFFERENTIAL METHOD BLD: ABNORMAL
EOSINOPHIL # BLD AUTO: 0.1 K/UL (ref 0–0.5)
EOSINOPHIL NFR BLD: 1 % (ref 0–8)
ERYTHROCYTE [DISTWIDTH] IN BLOOD BY AUTOMATED COUNT: 13.6 % (ref 11.5–14.5)
EST. GFR  (NO RACE VARIABLE): >60 ML/MIN/1.73 M^2
GLUCOSE SERPL-MCNC: 87 MG/DL (ref 70–110)
HCT VFR BLD AUTO: 26.7 % (ref 37–48.5)
HGB BLD-MCNC: 8.3 G/DL (ref 12–16)
IMM GRANULOCYTES # BLD AUTO: 0.02 K/UL (ref 0–0.04)
IMM GRANULOCYTES NFR BLD AUTO: 0.2 % (ref 0–0.5)
LYMPHOCYTES # BLD AUTO: 2.4 K/UL (ref 1–4.8)
LYMPHOCYTES NFR BLD: 28 % (ref 18–48)
MAGNESIUM SERPL-MCNC: 1.8 MG/DL (ref 1.6–2.6)
MCH RBC QN AUTO: 25.1 PG (ref 27–31)
MCHC RBC AUTO-ENTMCNC: 31.1 G/DL (ref 32–36)
MCV RBC AUTO: 81 FL (ref 82–98)
MONOCYTES # BLD AUTO: 0.7 K/UL (ref 0.3–1)
MONOCYTES NFR BLD: 8 % (ref 4–15)
NEUTROPHILS # BLD AUTO: 5.4 K/UL (ref 1.8–7.7)
NEUTROPHILS NFR BLD: 62.6 % (ref 38–73)
NRBC BLD-RTO: 0 /100 WBC
PHOSPHATE SERPL-MCNC: 3 MG/DL (ref 2.7–4.5)
PLATELET # BLD AUTO: 401 K/UL (ref 150–450)
PMV BLD AUTO: 8.7 FL (ref 9.2–12.9)
POTASSIUM SERPL-SCNC: 3.8 MMOL/L (ref 3.5–5.1)
PROT SERPL-MCNC: 6.7 G/DL (ref 6–8.4)
RBC # BLD AUTO: 3.31 M/UL (ref 4–5.4)
SODIUM SERPL-SCNC: 132 MMOL/L (ref 136–145)
WBC # BLD AUTO: 8.61 K/UL (ref 3.9–12.7)

## 2024-04-17 PROCEDURE — 63600175 PHARM REV CODE 636 W HCPCS: Mod: JZ,JG | Performed by: NURSE PRACTITIONER

## 2024-04-17 PROCEDURE — 83735 ASSAY OF MAGNESIUM: CPT | Performed by: COLON & RECTAL SURGERY

## 2024-04-17 PROCEDURE — 36415 COLL VENOUS BLD VENIPUNCTURE: CPT | Performed by: COLON & RECTAL SURGERY

## 2024-04-17 PROCEDURE — 85025 COMPLETE CBC W/AUTO DIFF WBC: CPT | Performed by: COLON & RECTAL SURGERY

## 2024-04-17 PROCEDURE — 80048 BASIC METABOLIC PNL TOTAL CA: CPT | Performed by: COLON & RECTAL SURGERY

## 2024-04-17 PROCEDURE — 63600175 PHARM REV CODE 636 W HCPCS: Performed by: REGISTERED NURSE

## 2024-04-17 PROCEDURE — 99232 SBSQ HOSP IP/OBS MODERATE 35: CPT | Mod: ,,,

## 2024-04-17 PROCEDURE — 80076 HEPATIC FUNCTION PANEL: CPT | Performed by: COLON & RECTAL SURGERY

## 2024-04-17 PROCEDURE — 25000003 PHARM REV CODE 250: Performed by: NURSE PRACTITIONER

## 2024-04-17 PROCEDURE — 94761 N-INVAS EAR/PLS OXIMETRY MLT: CPT

## 2024-04-17 PROCEDURE — 84100 ASSAY OF PHOSPHORUS: CPT | Performed by: COLON & RECTAL SURGERY

## 2024-04-17 RX ORDER — CIPROFLOXACIN 500 MG/1
500 TABLET ORAL EVERY 12 HOURS
Qty: 14 TABLET | Refills: 0 | Status: SHIPPED | OUTPATIENT
Start: 2024-04-17 | End: 2024-04-24

## 2024-04-17 RX ORDER — METRONIDAZOLE 500 MG/1
500 TABLET ORAL EVERY 8 HOURS
Qty: 21 TABLET | Refills: 0 | Status: SHIPPED | OUTPATIENT
Start: 2024-04-17 | End: 2024-04-24

## 2024-04-17 RX ORDER — HYDROCODONE BITARTRATE AND ACETAMINOPHEN 5; 325 MG/1; MG/1
1 TABLET ORAL EVERY 6 HOURS PRN
Qty: 15 TABLET | Refills: 0 | Status: SHIPPED | OUTPATIENT
Start: 2024-04-17

## 2024-04-17 RX ADMIN — METRONIDAZOLE 500 MG: 5 INJECTION, SOLUTION INTRAVENOUS at 12:04

## 2024-04-17 RX ADMIN — CIPROFLOXACIN 400 MG: 400 INJECTION, SOLUTION INTRAVENOUS at 08:04

## 2024-04-17 RX ADMIN — HYDROCODONE BITARTRATE AND ACETAMINOPHEN 1 TABLET: 5; 325 TABLET ORAL at 07:04

## 2024-04-17 RX ADMIN — METRONIDAZOLE 500 MG: 5 INJECTION, SOLUTION INTRAVENOUS at 05:04

## 2024-04-17 NOTE — SUBJECTIVE & OBJECTIVE
Interval History: abdominal pain improved, now intermittent located in LUQ. She has been NPO, bowel rest. She is passing flatus. Denies n/v.     Medications:  Continuous Infusions:  Current Facility-Administered Medications   Medication Dose Route Frequency Provider Last Rate Last Admin    0.9%  NaCl infusion   Intravenous Continuous Reese Spann  mL/hr at 04/16/24 0343 New Bag at 04/16/24 0343    acetaminophen suppository 650 mg  650 mg Rectal Q4H PRN Reese Spann NP        acetaminophen tablet 650 mg  650 mg Oral Q8H PRN Reese Spann NP        aluminum-magnesium hydroxide-simethicone 200-200-20 mg/5 mL suspension 30 mL  30 mL Oral QID PRN Reese Spann NP        ciprofloxacin (CIPRO)400mg/200ml D5W IVPB 400 mg  400 mg Intravenous Q12H Lance Fields Jr.,  mL/hr at 04/17/24 0834 400 mg at 04/17/24 0834    dextrose 10% bolus 125 mL 125 mL  12.5 g Intravenous PRN Reese Spann NP        dextrose 10% bolus 250 mL 250 mL  25 g Intravenous PRN Reese Spann NP        glucagon (human recombinant) injection 1 mg  1 mg Intramuscular PRN Reese Spann NP        glucose chewable tablet 16 g  16 g Oral PRN Reese Spann NP        glucose chewable tablet 24 g  24 g Oral PRN Reese Spann NP        HYDROcodone-acetaminophen 5-325 mg per tablet 1 tablet  1 tablet Oral Q6H PRN Reese Spann NP   1 tablet at 04/17/24 0744    melatonin tablet 6 mg  6 mg Oral Nightly PRN Reese Spann NP        metronidazole IVPB 500 mg  500 mg Intravenous Q8H Reese Spann NP   Stopped at 04/17/24 0604    morphine injection 2 mg  2 mg Intravenous Q4H PRN Reese Spann NP        naloxone 0.4 mg/mL injection 0.02 mg  0.02 mg Intravenous PRN Reese Spann NP        ondansetron injection 4 mg  4 mg Intravenous Q8H PRN Reese Spann NP        polyethylene glycol packet 17 g  17 g Oral Daily PRN Reese Spann, NP        promethazine tablet  25 mg  25 mg Oral Q6H PRN Reese Spann NP        simethicone chewable tablet 80 mg  1 tablet Oral QID PRN Reese Spann NP        sodium chloride 0.9% flush 3 mL  3 mL Intravenous Q12H PRN Reese Spann NP         Scheduled Meds:  Current Facility-Administered Medications   Medication Dose Route Frequency Provider Last Rate Last Admin    0.9%  NaCl infusion   Intravenous Continuous Reese Spann  mL/hr at 04/16/24 0343 New Bag at 04/16/24 0343    acetaminophen suppository 650 mg  650 mg Rectal Q4H PRN Reese Spann NP        acetaminophen tablet 650 mg  650 mg Oral Q8H PRN Reese Spann NP        aluminum-magnesium hydroxide-simethicone 200-200-20 mg/5 mL suspension 30 mL  30 mL Oral QID PRN Reese Spann NP        ciprofloxacin (CIPRO)400mg/200ml D5W IVPB 400 mg  400 mg Intravenous Q12H Lance Fields Jr.,  mL/hr at 04/17/24 0834 400 mg at 04/17/24 0834    dextrose 10% bolus 125 mL 125 mL  12.5 g Intravenous PRN Reese Spann NP        dextrose 10% bolus 250 mL 250 mL  25 g Intravenous PRN Reese Spann NP        glucagon (human recombinant) injection 1 mg  1 mg Intramuscular PRN Reese Spann NP        glucose chewable tablet 16 g  16 g Oral PRN Reese Spann NP        glucose chewable tablet 24 g  24 g Oral PRN Reese Spann NP        HYDROcodone-acetaminophen 5-325 mg per tablet 1 tablet  1 tablet Oral Q6H PRN Reese Spann NP   1 tablet at 04/17/24 0744    melatonin tablet 6 mg  6 mg Oral Nightly PRN Reese Spann NP        metronidazole IVPB 500 mg  500 mg Intravenous Q8H Reese Spann NP   Stopped at 04/17/24 0604    morphine injection 2 mg  2 mg Intravenous Q4H PRN Reese Spann NP        naloxone 0.4 mg/mL injection 0.02 mg  0.02 mg Intravenous PRN Reese Spann NP        ondansetron injection 4 mg  4 mg Intravenous Q8H PRN Reese Spann, NP        polyethylene glycol packet 17 g   17 g Oral Daily PRN Reese Spann NP        promethazine tablet 25 mg  25 mg Oral Q6H PRN Reese Spann NP        simethicone chewable tablet 80 mg  1 tablet Oral QID PRN Reese Spann NP        sodium chloride 0.9% flush 3 mL  3 mL Intravenous Q12H PRN Reese Spann NP         PRN Meds:  Current Facility-Administered Medications   Medication Dose Route Frequency Provider Last Rate Last Admin    0.9%  NaCl infusion   Intravenous Continuous Reese Spann  mL/hr at 04/16/24 0343 New Bag at 04/16/24 0343    acetaminophen suppository 650 mg  650 mg Rectal Q4H PRN Reese Spann NP        acetaminophen tablet 650 mg  650 mg Oral Q8H PRN Reese Spann NP        aluminum-magnesium hydroxide-simethicone 200-200-20 mg/5 mL suspension 30 mL  30 mL Oral QID PRN Reese Spann NP        ciprofloxacin (CIPRO)400mg/200ml D5W IVPB 400 mg  400 mg Intravenous Q12H Lance Fields Jr.,  mL/hr at 04/17/24 0834 400 mg at 04/17/24 0834    dextrose 10% bolus 125 mL 125 mL  12.5 g Intravenous PRN Reese Spann NP        dextrose 10% bolus 250 mL 250 mL  25 g Intravenous PRN Reese Spann NP        glucagon (human recombinant) injection 1 mg  1 mg Intramuscular PRN Reese Spann NP        glucose chewable tablet 16 g  16 g Oral PRN Reese Spann NP        glucose chewable tablet 24 g  24 g Oral PRN Reese Spann NP        HYDROcodone-acetaminophen 5-325 mg per tablet 1 tablet  1 tablet Oral Q6H PRN Reese Spann NP   1 tablet at 04/17/24 0744    melatonin tablet 6 mg  6 mg Oral Nightly PRN Reese Spann NP        metronidazole IVPB 500 mg  500 mg Intravenous Q8H Reese Spann NP   Stopped at 04/17/24 0604    morphine injection 2 mg  2 mg Intravenous Q4H PRN Alombro, Reese J, NP        naloxone 0.4 mg/mL injection 0.02 mg  0.02 mg Intravenous PRN Reese Spann, NP        ondansetron injection 4 mg  4 mg Intravenous Q8H PRN  Reese Spann, JOSE MANUEL        polyethylene glycol packet 17 g  17 g Oral Daily PRN Reese Spann NP        promethazine tablet 25 mg  25 mg Oral Q6H PRN Reese Spann NP        simethicone chewable tablet 80 mg  1 tablet Oral QID PRN Reees Spann NP        sodium chloride 0.9% flush 3 mL  3 mL Intravenous Q12H PRN Reese Spann NP            Review of patient's allergies indicates:  No Known Allergies  Objective:     Vital Signs (Most Recent):  Temp: 98.5 °F (36.9 °C) (04/17/24 0729)  Pulse: 86 (04/17/24 0729)  Resp: 17 (04/17/24 0744)  BP: (!) 104/54 (04/17/24 0729)  SpO2: 99 % (04/17/24 0738) Vital Signs (24h Range):  Temp:  [98.1 °F (36.7 °C)-99.4 °F (37.4 °C)] 98.5 °F (36.9 °C)  Pulse:  [86-98] 86  Resp:  [15-18] 17  SpO2:  [96 %-99 %] 99 %  BP: (102-123)/(50-62) 104/54     Weight: 97.7 kg (215 lb 7.3 oz)  Body mass index is 31.82 kg/m².    Intake/Output - Last 3 Shifts         04/15 0700  04/16 0659 04/16 0700  04/17 0659 04/17 0700  04/18 0659    P.O. 0      Total Intake(mL/kg) 0 (0)      Net 0             Urine Occurrence 0 x 1 x              Physical Exam  Constitutional:       Appearance: She is well-developed.   HENT:      Head: Normocephalic and atraumatic.   Eyes:      Conjunctiva/sclera: Conjunctivae normal.   Neck:      Thyroid: No thyromegaly.   Cardiovascular:      Rate and Rhythm: Normal rate.   Pulmonary:      Effort: Pulmonary effort is normal. No respiratory distress.   Abdominal:      Comments: Soft, nondistended, +mild TTP L lateral abdomen >LLQ; no rebound or guarding   Musculoskeletal:         General: No tenderness. Normal range of motion.      Cervical back: Normal range of motion.   Skin:     General: Skin is warm and dry.      Capillary Refill: Capillary refill takes less than 2 seconds.      Findings: No rash.   Neurological:      Mental Status: She is alert and oriented to person, place, and time.          Significant Labs:  I have reviewed all pertinent lab  results within the past 24 hours.  CBC:   Recent Labs   Lab 04/17/24  0640   WBC 8.61   RBC 3.31*   HGB 8.3*   HCT 26.7*      MCV 81*   MCH 25.1*   MCHC 31.1*     BMP:   Recent Labs   Lab 04/17/24  0640   GLU 87   *   K 3.8      CO2 22*   BUN 5*   CREATININE 0.8   CALCIUM 8.9   MG 1.8       Significant Diagnostics:  I have reviewed all pertinent imaging results/findings within the past 24 hours.

## 2024-04-17 NOTE — PLAN OF CARE
[facility]  Discharge Final Note    Primary Care Provider: Neelam Benjamin    Expected Discharge Date: 4/17/2024    Final Discharge Note (most recent)       Final Note - 04/17/24 1410          Final Note    Assessment Type Final Discharge Note (P)      Anticipated Discharge Disposition Home or Self Care (P)         Post-Acute Status    Discharge Delays None known at this time (P)                      Important Message from Medicare             Contact Info       [facility] [place]   Relationship: PCP - General    [address]  [place] [address]   Phone: [phone]       Next Steps: Schedule an appointment as soon as possible for a visit in 1 week(s)    Instructions: Hospital discharge follow up    Chris Rosenthal MD   Specialty: Colon and Rectal Surgery, General Surgery    [address] [place] [address] [address] [place] [id number]   Phone: [phone]       Next Steps: Schedule an appointment as soon as possible for a visit in 1 week(s)    Instructions: Hospital discharge follow up

## 2024-04-17 NOTE — PROGRESS NOTES
[facility]  Colorectal Surgery  Progress Note    Subjective:     Interval History: abdominal pain improved, now intermittent located in LUQ. She has been NPO, bowel rest. She is passing flatus. Denies n/v.     Medications:  Continuous Infusions:  Current Facility-Administered Medications   Medication Dose Route Frequency Provider Last Rate Last Admin    0.9%  NaCl infusion   Intravenous Continuous Reese Spann  mL/hr at 04/16/24 0343 New Bag at 04/16/24 0343    acetaminophen suppository 650 mg  650 mg Rectal Q4H PRN Reese Spann NP        acetaminophen tablet 650 mg  650 mg Oral Q8H PRN Reese Spann NP        aluminum-magnesium hydroxide-simethicone 200-200-20 mg/5 mL suspension 30 mL  30 mL Oral QID PRN Reese Spann NP        ciprofloxacin (CIPRO)400mg/200ml D5W IVPB 400 mg  400 mg Intravenous Q12H Lance Fields Jr.,  mL/hr at 04/17/24 0834 400 mg at 04/17/24 0834    dextrose 10% bolus 125 mL 125 mL  12.5 g Intravenous PRN Reese Spann NP        dextrose 10% bolus 250 mL 250 mL  25 g Intravenous PRN Reese Spann NP        glucagon (human recombinant) injection 1 mg  1 mg Intramuscular PRN Reese Spann NP        glucose chewable tablet 16 g  16 g Oral PRN Reese Spann NP        glucose chewable tablet 24 g  24 g Oral PRN Reese Spann NP        HYDROcodone-acetaminophen 5-325 mg per tablet 1 tablet  1 tablet Oral Q6H PRN Reese Spann NP   1 tablet at 04/17/24 0744    melatonin tablet 6 mg  6 mg Oral Nightly PRN Reese Spann NP        metronidazole IVPB 500 mg  500 mg Intravenous Q8H Reese Spann NP   Stopped at 04/17/24 0604    morphine injection 2 mg  2 mg Intravenous Q4H PRN Reese Spann NP        naloxone 0.4 mg/mL injection 0.02 mg  0.02 mg Intravenous PRN Reese Spann NP        ondansetron injection 4 mg  4 mg Intravenous Q8H PRN Reese Spann, NP        polyethylene glycol packet  17 g  17 g Oral Daily PRN Reese Spann NP        promethazine tablet 25 mg  25 mg Oral Q6H PRN Reese Spann NP        simethicone chewable tablet 80 mg  1 tablet Oral QID PRN Reese Spann NP        sodium chloride 0.9% flush 3 mL  3 mL Intravenous Q12H PRN Reese Spann NP         Scheduled Meds:  Current Facility-Administered Medications   Medication Dose Route Frequency Provider Last Rate Last Admin    0.9%  NaCl infusion   Intravenous Continuous Reese Spann  mL/hr at 04/16/24 0343 New Bag at 04/16/24 0343    acetaminophen suppository 650 mg  650 mg Rectal Q4H PRN Reese Spann NP        acetaminophen tablet 650 mg  650 mg Oral Q8H PRN Reese Spann NP        aluminum-magnesium hydroxide-simethicone 200-200-20 mg/5 mL suspension 30 mL  30 mL Oral QID PRN Reese Spann NP        ciprofloxacin (CIPRO)400mg/200ml D5W IVPB 400 mg  400 mg Intravenous Q12H Lance Fields Jr.,  mL/hr at 04/17/24 0834 400 mg at 04/17/24 0834    dextrose 10% bolus 125 mL 125 mL  12.5 g Intravenous PRN Reese Spann NP        dextrose 10% bolus 250 mL 250 mL  25 g Intravenous PRN Reese Spann NP        glucagon (human recombinant) injection 1 mg  1 mg Intramuscular PRN Reese Spann NP        glucose chewable tablet 16 g  16 g Oral PRN Reese Spann NP        glucose chewable tablet 24 g  24 g Oral PRN Reese Spann NP        HYDROcodone-acetaminophen 5-325 mg per tablet 1 tablet  1 tablet Oral Q6H PRN Reese Spann NP   1 tablet at 04/17/24 0744    melatonin tablet 6 mg  6 mg Oral Nightly PRN Reese Spann NP        metronidazole IVPB 500 mg  500 mg Intravenous Q8H Reese Spann NP   Stopped at 04/17/24 0604    morphine injection 2 mg  2 mg Intravenous Q4H PRN Alombro, Reese J, NP        naloxone 0.4 mg/mL injection 0.02 mg  0.02 mg Intravenous PRN Reese Spann, NP        ondansetron injection 4 mg  4 mg  Intravenous Q8H PRN Reese Spann NP        polyethylene glycol packet 17 g  17 g Oral Daily PRN Reese Spann NP        promethazine tablet 25 mg  25 mg Oral Q6H PRN Reese Spann NP        simethicone chewable tablet 80 mg  1 tablet Oral QID PRN Reese Spann NP        sodium chloride 0.9% flush 3 mL  3 mL Intravenous Q12H PRN Reese Spann NP         PRN Meds:  Current Facility-Administered Medications   Medication Dose Route Frequency Provider Last Rate Last Admin    0.9%  NaCl infusion   Intravenous Continuous Reese Spann  mL/hr at 04/16/24 0343 New Bag at 04/16/24 0343    acetaminophen suppository 650 mg  650 mg Rectal Q4H PRN Reese Spann NP        acetaminophen tablet 650 mg  650 mg Oral Q8H PRN Reese Spann NP        aluminum-magnesium hydroxide-simethicone 200-200-20 mg/5 mL suspension 30 mL  30 mL Oral QID PRN Reese Spann NP        ciprofloxacin (CIPRO)400mg/200ml D5W IVPB 400 mg  400 mg Intravenous Q12H Lance Fields Jr.,  mL/hr at 04/17/24 0834 400 mg at 04/17/24 0834    dextrose 10% bolus 125 mL 125 mL  12.5 g Intravenous PRN Reese Spann NP        dextrose 10% bolus 250 mL 250 mL  25 g Intravenous PRN Reese Spann NP        glucagon (human recombinant) injection 1 mg  1 mg Intramuscular PRN Reese Spann NP        glucose chewable tablet 16 g  16 g Oral PRN Reese Spann NP        glucose chewable tablet 24 g  24 g Oral PRN Reese Spann NP        HYDROcodone-acetaminophen 5-325 mg per tablet 1 tablet  1 tablet Oral Q6H PRN Reese Spann NP   1 tablet at 04/17/24 0744    melatonin tablet 6 mg  6 mg Oral Nightly PRN Reese Spann NP        metronidazole IVPB 500 mg  500 mg Intravenous Q8H Reese Spann NP   Stopped at 04/17/24 0604    morphine injection 2 mg  2 mg Intravenous Q4H PRN Reese Spann, NP        naloxone 0.4 mg/mL injection 0.02 mg  0.02 mg Intravenous PRN  Reese Spann NP        ondansetron injection 4 mg  4 mg Intravenous Q8H PRN Reese Spann NP        polyethylene glycol packet 17 g  17 g Oral Daily PRN Reese Spann NP        promethazine tablet 25 mg  25 mg Oral Q6H PRN Reese Spann NP        simethicone chewable tablet 80 mg  1 tablet Oral QID PRN Reese Spann NP        sodium chloride 0.9% flush 3 mL  3 mL Intravenous Q12H PRN Reese Spann NP            Review of patient's allergies indicates:  No Known Allergies  Objective:     Vital Signs (Most Recent):  Temp: 98.5 °F (36.9 °C) (04/17/24 0729)  Pulse: 86 (04/17/24 0729)  Resp: 17 (04/17/24 0744)  BP: (!) 104/54 (04/17/24 0729)  SpO2: 99 % (04/17/24 0738) Vital Signs (24h Range):  Temp:  [98.1 °F (36.7 °C)-99.4 °F (37.4 °C)] 98.5 °F (36.9 °C)  Pulse:  [86-98] 86  Resp:  [15-18] 17  SpO2:  [96 %-99 %] 99 %  BP: (102-123)/(50-62) 104/54     Weight: 97.7 kg (215 lb 7.3 oz)  Body mass index is 31.82 kg/m².    Intake/Output - Last 3 Shifts         04/15 0700 04/16 0659 04/16 0700 04/17 0659 04/17 0700 04/18 0659    P.O. 0      Total Intake(mL/kg) 0 (0)      Net 0             Urine Occurrence 0 x 1 x              Physical Exam  Constitutional:       Appearance: She is well-developed.   HENT:      Head: Normocephalic and atraumatic.   Eyes:      Conjunctiva/sclera: Conjunctivae normal.   Neck:      Thyroid: No thyromegaly.   Cardiovascular:      Rate and Rhythm: Normal rate.   Pulmonary:      Effort: Pulmonary effort is normal. No respiratory distress.   Abdominal:      Comments: Soft, nondistended, +mild TTP L lateral abdomen >LLQ; no rebound or guarding   Musculoskeletal:         General: No tenderness. Normal range of motion.      Cervical back: Normal range of motion.   Skin:     General: Skin is warm and dry.      Capillary Refill: Capillary refill takes less than 2 seconds.      Findings: No rash.   Neurological:      Mental Status: She is alert and oriented to person,  place, and time.          Significant Labs:  I have reviewed all pertinent lab results within the past 24 hours.  CBC:   Recent Labs   Lab 04/17/24  0640   WBC 8.61   RBC 3.31*   HGB 8.3*   HCT 26.7*      MCV 81*   MCH 25.1*   MCHC 31.1*     BMP:   Recent Labs   Lab 04/17/24  0640   GLU 87   *   K 3.8      CO2 22*   BUN 5*   CREATININE 0.8   CALCIUM 8.9   MG 1.8       Significant Diagnostics:  I have reviewed all pertinent imaging results/findings within the past 24 hours.  Assessment/Plan:     * Diverticulitis  39yo F with likely descending diverticulitis with mesenteric phlegmon    - No acute surgical intervention required at this time.  Given the previous history of diverticulitis as well as previous colonoscopy last year, I believe this to be more related to diverticular flare as opposed to malignancy. Pain has improved over last 24 hours. Would continue treat with IV antibiotics with eventual transition to po abx. Ok for CLD and monitor for tolerance.  If this is not improve with IV antibiotics/bowel rest, could require surgical intervention during this admission.  This was discussed with the patient and she is amenable to this plan.  - CLD today   - IVF  - IV abx  - OOB, ambulation encouraged    Sleep apnea  Care per hospital medicine    Uncomplicated asthma  Care per hospital medicine        Tomer Moura PA-C  Colorectal Surgery  [facility]

## 2024-04-17 NOTE — NURSING
Pt remains free of falls/injury. Safety precautions maintained. Pt denies pain/discomfort. IV abx given.  Clear liquid diet tolerated. VSS. No S/S of distress noted at this time. Pt education complete.  Prescriptions given to pt.  [facility]

## 2024-04-17 NOTE — ASSESSMENT & PLAN NOTE
39yo F with likely descending diverticulitis with mesenteric phlegmon    - No acute surgical intervention required at this time.  Given the previous history of diverticulitis as well as previous colonoscopy last year, I believe this to be more related to diverticular flare as opposed to malignancy. Pain has improved over last 24 hours. Would continue treat with IV antibiotics with eventual transition to po abx. Ok for CLD and monitor for tolerance.  If this is not improve with IV antibiotics/bowel rest, could require surgical intervention during this admission.  This was discussed with the patient and she is amenable to this plan.  - CLD today   - IVF  - IV abx  - OOB, ambulation encouraged

## 2024-04-18 ENCOUNTER — PATIENT MESSAGE (OUTPATIENT)
Dept: SURGERY | Facility: CLINIC | Age: 39
End: 2024-04-18
Payer: OTHER GOVERNMENT

## 2024-04-18 ENCOUNTER — TELEPHONE (OUTPATIENT)
Dept: SURGERY | Facility: CLINIC | Age: 39
End: 2024-04-18
Payer: OTHER GOVERNMENT

## 2024-04-18 NOTE — TELEPHONE ENCOUNTER
RN initially called pt's number, unable to get call through. RN called Ryan and let him know that we have been trying to contact pt. He stated he will call pt or send her a message to let her know to call us back.     ----- Message from Chris Rosenthal MD sent at 4/18/2024  3:26 PM CDT -----  Patient discharged yesterday that needs an appointment with me in 2 weeks.    Thanks,  MDG

## 2024-04-22 ENCOUNTER — OFFICE VISIT (OUTPATIENT)
Dept: OTOLARYNGOLOGY | Facility: CLINIC | Age: 39
End: 2024-04-22
Payer: OTHER GOVERNMENT

## 2024-04-22 VITALS — BODY MASS INDEX: 30.17 KG/M2 | WEIGHT: 203.69 LBS | TEMPERATURE: 98 F | HEIGHT: 69 IN

## 2024-04-22 DIAGNOSIS — Z90.89 S/P TONSILLECTOMY: Primary | ICD-10-CM

## 2024-04-22 PROCEDURE — 99213 OFFICE O/P EST LOW 20 MIN: CPT | Mod: PBBFAC | Performed by: PHYSICIAN ASSISTANT

## 2024-04-22 PROCEDURE — 99999 PR PBB SHADOW E&M-EST. PATIENT-LVL III: CPT | Mod: PBBFAC,,, | Performed by: PHYSICIAN ASSISTANT

## 2024-04-22 PROCEDURE — 99024 POSTOP FOLLOW-UP VISIT: CPT | Mod: ,,, | Performed by: PHYSICIAN ASSISTANT

## 2024-04-28 NOTE — DISCHARGE SUMMARY
[facility]  Hospital Medicine  Discharge Summary      Patient Name: Suri Hameed  MRN: [id number]  MARLEY: [id number]  Patient Class: IP- Inpatient  Admission Date: 4/15/2024  Hospital Length of Stay: 2 days  Discharge Date and Time: 4/17/2024  3:35 PM  Attending Physician: Tiera att. providers found   Discharging Provider: Gustavo Navarro MD  Primary Care Provider: [facility]    Primary Care Team: Networked reference to record PCT     HPI:   Suri Hameed is a 38 y.o. female with a PMH  has a past medical history of Mild intermittent asthma, uncomplicated and Sleep apnea.  Presented to the ER for diffuse abdominal pain gradually worsened over the last few days.  Patient states her pain is more prominent in the left lower quadrant.  Denies any suspicious food intake, [place] illnesses, or sick contacts.  Denies any associated fever, chills, sweats, nausea, vomiting, bladder/bowel complaints, or any other symptoms at this time.    ER workup revealed leukocytosis of 12.77, H/H of 11.7/37.3, and CT scan demonstrated:[Again there is regional mural thickening, underlying diverticulosis surrounding stranding and mesenteric prominent lymph nodes at the distal descending colon. No overt perforation. Associated mesenteric phlegmon possible 2 cm. Again findings may be inflammatory related to diverticulitis versus neoplastic].  ER provider discussed findings with general surgeon on-call (Dr. Rosenthal) who recommended hospital Medicine to admit for IV antibiotics and serial abdominal exams.  Will see patient was consult in a.m. patient in agreement with treatment plan.  Patient will be admitted under inpatient status.    PCP: No, Primary Doctor      * No surgery found *      Hospital Course:   4/16  Admitted for diverticulitis with mesenteric phlegmon  On IV antibiotics, IV fluids, bowel rest  Surgery following  Pain currently 3/10, LLQ, improving  Reports flatus, no BM    4/17  Pain resolved, tolerating  diet  Stable for discharge home  F/U with surgery and PCP for further management     Goals of Care Treatment Preferences:  Code Status: Full Code      Consults:   Consults (From admission, onward)          Status Ordering Provider     Inpatient consult to General Surgery  Once        Provider:  Bam Epstein MD    Completed OBDULIA SIEGEL            No new Assessment & Plan notes have been filed under this hospital service since the last note was generated.  Service: Hospital Medicine    Final Active Diagnoses:    Diagnosis Date Noted POA    PRINCIPAL PROBLEM:  Diverticulitis [K57.92] 04/16/2024 Yes    Uncomplicated asthma [J45.909] 04/16/2024 Yes    Sleep apnea [G47.30] 04/16/2024 Yes      Problems Resolved During this Admission:       Discharged Condition: stable    Disposition: Home or Self Care    Follow Up:   Follow-up Information       [facility]. Schedule an appointment as soon as possible for a visit in 1 week(s).    Why: Hospital discharge follow up  Contact information:  [address] [facility]  [place] [address] [phone]               Chris Rosenthal MD. Schedule an appointment as soon as possible for a visit in 1 week(s).    Specialties: Colon and Rectal Surgery, General Surgery  Why: Hospital discharge follow up  Contact information:  [address] [place] [address] [place] [address] [phone]                           Patient Instructions:   No discharge procedures on file.    Significant Diagnostic Studies: Labs: All labs within the past 24 hours have been reviewed    Pending Diagnostic Studies:       None           Medications:  Reconciled Home Medications:      Medication List        START taking these medications      HYDROcodone-acetaminophen 5-325 mg per tablet  Commonly known as: NORCO  Take 1 tablet by mouth every 6 (six) hours as needed for Pain.            CONTINUE taking these medications      albuterol 90 mcg/actuation inhaler  Commonly known as:  PROVENTIL/VENTOLIN HFA  INHALE 2 PUFFS BY MOUTH FOUR TIMES A DAY AS NEEDED FOR ASTHMA ATTACK     fexofenadine 180 MG tablet  Commonly known as: ALLEGRA  180 mg.     fluticasone propionate 50 mcg/actuation nasal spray  Commonly known as: FLONASE  INSTILL 1 SPRAY IN EACH NOSTRIL EVERY DAY FOR ALLERGIES            STOP taking these medications      amoxicillin-clavulanate 875-125mg 875-125 mg per tablet  Commonly known as: AUGMENTIN     dexAMETHasone 6 MG tablet  Commonly known as: DECADRON     methylPREDNISolone 4 mg tablet  Commonly known as: MEDROL DOSEPACK     oxyCODONE 5 MG immediate release tablet  Commonly known as: ROXICODONE            ASK your doctor about these medications      ciprofloxacin HCl 500 MG tablet  Commonly known as: CIPRO  Take 1 tablet (500 mg total) by mouth every 12 (twelve) hours. for 7 days  Ask about: Should I take this medication?     metroNIDAZOLE 500 MG tablet  Commonly known as: FLAGYL  Take 1 tablet (500 mg total) by mouth every 8 (eight) hours. for 7 days  Ask about: Should I take this medication?              Indwelling Lines/Drains at time of discharge:   Lines/Drains/Airways       None                   Time spent on the discharge of patient: 33 minutes         Gustavo Navarro MD  Department of Hospital Medicine  [facility]

## 2024-06-24 ENCOUNTER — OFFICE VISIT (OUTPATIENT)
Dept: SURGERY | Facility: CLINIC | Age: 39
End: 2024-06-24
Payer: OTHER GOVERNMENT

## 2024-06-24 VITALS
TEMPERATURE: 98 F | DIASTOLIC BLOOD PRESSURE: 81 MMHG | SYSTOLIC BLOOD PRESSURE: 129 MMHG | HEART RATE: 82 BPM | WEIGHT: 202.5 LBS | OXYGEN SATURATION: 97 % | BODY MASS INDEX: 29.9 KG/M2

## 2024-06-24 DIAGNOSIS — R93.5 ABNORMAL CT OF THE ABDOMEN: ICD-10-CM

## 2024-06-24 DIAGNOSIS — K57.92 DIVERTICULITIS: Primary | ICD-10-CM

## 2024-06-24 PROCEDURE — 99999 PR PBB SHADOW E&M-EST. PATIENT-LVL III: CPT | Mod: PBBFAC,,, | Performed by: COLON & RECTAL SURGERY

## 2024-06-24 PROCEDURE — 99214 OFFICE O/P EST MOD 30 MIN: CPT | Mod: S$PBB,,, | Performed by: COLON & RECTAL SURGERY

## 2024-06-24 PROCEDURE — 99213 OFFICE O/P EST LOW 20 MIN: CPT | Mod: PBBFAC | Performed by: COLON & RECTAL SURGERY

## 2024-06-24 RX ORDER — SODIUM, POTASSIUM,MAG SULFATES 17.5-3.13G
1 SOLUTION, RECONSTITUTED, ORAL ORAL DAILY
Qty: 1 KIT | Refills: 0 | Status: SHIPPED | OUTPATIENT
Start: 2024-06-24 | End: 2024-06-26

## 2024-06-24 NOTE — PROGRESS NOTES
History & Physical    Subjective     CC: diverticulitis     History of Present Illness:  Patient is a 38 y.o. female presents with for hospital follow up s/p diverticulitis.  She originally presented to the ED for evaluation of LLQ abdominal pain.  Workup in ED was concerning for leukocytosis of 12k and a CT scan concerning for a phlegmon near the descending colon concerning for diverticulitis versus malignancy. There was an associated phlegmon in the mesentery. She was admitted to the hospital medical service and surgery was consulted for evaluation.  She stayed for about 2 days with IV antibiotics and bowel rest.  Was later discharged with p.o. antibiotics which she has completed.  She has no longer having any abdominal pain.  Reports previous history of diverticulitis requiring admission in [place] for IV antibiotics but did not require any percutaneous drainage or surgical intervention.  Otherwise, had several episodes of diverticulitis before that did not require hospitalization. She reports having colonoscopy in [place], [place] in 2023 where diverticulosis was seen as well as one polyp was removed. Denies any previous abdominal surgical history. Denies any fever, chills, nausea, vomiting, hematochezia or melena.  Since discharge, she has been tolerating a regular diet and not having any further abdominal pain; having normal bowel movements.    Review of patient's allergies indicates:  No Known Allergies    Current Outpatient Medications   Medication Sig Dispense Refill    albuterol (PROVENTIL/VENTOLIN HFA) 90 mcg/actuation inhaler INHALE 2 PUFFS BY MOUTH FOUR TIMES A DAY AS NEEDED FOR ASTHMA ATTACK      fexofenadine (ALLEGRA) 180 MG tablet 180 mg.      fluticasone propionate (FLONASE) 50 mcg/actuation nasal spray INSTILL 1 SPRAY IN EACH NOSTRIL EVERY DAY FOR ALLERGIES      HYDROcodone-acetaminophen (NORCO) 5-325 mg per tablet Take 1 tablet by mouth every 6 (six) hours as needed for Pain. 15 tablet 0     sodium,potassium,mag sulfates (SUPREP BOWEL PREP KIT) 17.5-3.13-1.6 gram SolR Take 177 mLs by mouth once daily. for 2 days 1 kit 0     No current facility-administered medications for this visit.       Past Medical History:   Diagnosis Date    Mild intermittent asthma, uncomplicated     Sleep apnea      Past Surgical History:   Procedure Laterality Date    TONSILLECTOMY Bilateral 4/5/2024    Procedure: TONSILLECTOMY;  Surgeon: Juan M Cevallos MD;  Location: [facility];  Service: ENT;  Laterality: Bilateral;     Family History   Problem Relation Name Age of Onset    Heart disease Father      Seizures Sister       Social History     Tobacco Use    Smoking status: Never     Passive exposure: Never   Substance Use Topics    Alcohol use: Yes     Comment: OCC    Drug use: Never        Review of Systems:  Review of Systems   Constitutional:  Negative for activity change, appetite change, chills, fatigue, fever and unexpected weight change.   HENT:  Negative for congestion, ear pain, sore throat and trouble swallowing.    Eyes:  Negative for pain, redness and itching.   Respiratory:  Negative for cough, shortness of breath and wheezing.    Cardiovascular:  Negative for chest pain, palpitations and leg swelling.   Gastrointestinal:  Negative for abdominal distention, abdominal pain, anal bleeding, blood in stool, constipation, diarrhea, nausea, rectal pain and vomiting.   Endocrine: Negative for cold intolerance, heat intolerance and polyuria.   Genitourinary:  Negative for dysuria, flank pain, frequency and hematuria.   Musculoskeletal:  Negative for gait problem, joint swelling and neck pain.   Skin:  Negative for color change, rash and wound.   Allergic/Immunologic: Negative for environmental allergies and immunocompromised state.   Neurological:  Negative for dizziness, speech difficulty, weakness and numbness.   Psychiatric/Behavioral:  Negative for agitation, confusion and hallucinations.           Objective     Vital  Signs (Most Recent)  Temp: 98.3 °F (36.8 °C) (06/24/24 1330)  Pulse: 82 (06/24/24 1330)  BP: 129/81 (06/24/24 1330)  SpO2: 97 % (06/24/24 1330)     91.8 kg (202 lb 7.9 oz)     Physical Exam:  Physical Exam  Vitals reviewed.   Constitutional:       General: She is not in acute distress.     Appearance: Normal appearance. She is well-developed. She is not ill-appearing or toxic-appearing.   HENT:      Head: Normocephalic and atraumatic.      Right Ear: External ear normal.      Left Ear: External ear normal.      Nose: Nose normal.   Cardiovascular:      Rate and Rhythm: Normal rate.   Pulmonary:      Effort: Pulmonary effort is normal. No respiratory distress.   Abdominal:      General: Abdomen is flat. There is no distension.      Palpations: Abdomen is soft.      Tenderness: There is no abdominal tenderness.      Comments: No previous incisions   Musculoskeletal:         General: Normal range of motion.      Cervical back: Normal range of motion and neck supple.   Skin:     General: Skin is warm and dry.   Neurological:      Mental Status: She is alert and oriented to person, place, and time.   Psychiatric:         Mood and Affect: Mood normal.         Behavior: Behavior normal.       Diagnostic Results:  EXAMINATION:  CT ABDOMEN PELVIS WITH IV CONTRAST     CLINICAL HISTORY:  Abdominal pain, acute, nonlocalized;     TECHNIQUE:  Low dose axial images, sagittal and coronal reformations were obtained from the lung bases to the pubic symphysis following the IV administration of 100 mL of Omnipaque 350 and rectal contrast     COMPARISON:  Earlier imaging same date noncontrast     Impression:     Again there is regional mural thickening, underlying diverticulosis surrounding stranding and mesenteric prominent lymph nodes at the distal descending colon.  No overt perforation.  Associated mesenteric phlegmon possible 2 cm.  Again findings may be inflammatory related to diverticulitis versus neoplastic.     Scant ascites  present.     No obstructive bowel findings.     No sizable liver lesion     No adrenal gland mass     Pancreas, spleen and kidneys stable unremarkable.     Urinary bladder decompressed       Assessment and Plan     38 y.o. female with descending diverticulitis with mesenteric phlegmon     -discussed pathophysiology of diverticulitis   -discussed complicated vs uncomplicated diverticulitis, as well as treatment options and management  -encouraged high fiber diet  -recommend colonoscopy in near future given previous colonoscopy >1yr ago and suspicious CT scan. Plan for colonoscopy 07/17/24. Suprep to pharmacy   -RTC 6 weeks     Chris Rosenthal MD  Colon and Rectal Surgery  [facility]

## 2024-06-24 NOTE — H&P (VIEW-ONLY)
History & Physical    Subjective     CC: diverticulitis     History of Present Illness:  Patient is a 38 y.o. female presents with for hospital follow up s/p diverticulitis.  She originally presented to the ED for evaluation of LLQ abdominal pain.  Workup in ED was concerning for leukocytosis of 12k and a CT scan concerning for a phlegmon near the descending colon concerning for diverticulitis versus malignancy. There was an associated phlegmon in the mesentery. She was admitted to the hospital medical service and surgery was consulted for evaluation.  She stayed for about 2 days with IV antibiotics and bowel rest.  Was later discharged with p.o. antibiotics which she has completed.  She has no longer having any abdominal pain.  Reports previous history of diverticulitis requiring admission in [place] for IV antibiotics but did not require any percutaneous drainage or surgical intervention.  Otherwise, had several episodes of diverticulitis before that did not require hospitalization. She reports having colonoscopy in [place], [place] in 2023 where diverticulosis was seen as well as one polyp was removed. Denies any previous abdominal surgical history. Denies any fever, chills, nausea, vomiting, hematochezia or melena.  Since discharge, she has been tolerating a regular diet and not having any further abdominal pain; having normal bowel movements.    Review of patient's allergies indicates:  No Known Allergies    Current Outpatient Medications   Medication Sig Dispense Refill    albuterol (PROVENTIL/VENTOLIN HFA) 90 mcg/actuation inhaler INHALE 2 PUFFS BY MOUTH FOUR TIMES A DAY AS NEEDED FOR ASTHMA ATTACK      fexofenadine (ALLEGRA) 180 MG tablet 180 mg.      fluticasone propionate (FLONASE) 50 mcg/actuation nasal spray INSTILL 1 SPRAY IN EACH NOSTRIL EVERY DAY FOR ALLERGIES      HYDROcodone-acetaminophen (NORCO) 5-325 mg per tablet Take 1 tablet by mouth every 6 (six) hours as needed for Pain. 15 tablet 0     sodium,potassium,mag sulfates (SUPREP BOWEL PREP KIT) 17.5-3.13-1.6 gram SolR Take 177 mLs by mouth once daily. for 2 days 1 kit 0     No current facility-administered medications for this visit.       Past Medical History:   Diagnosis Date    Mild intermittent asthma, uncomplicated     Sleep apnea      Past Surgical History:   Procedure Laterality Date    TONSILLECTOMY Bilateral 4/5/2024    Procedure: TONSILLECTOMY;  Surgeon: Juan M Cevallos MD;  [facility];  Service: ENT;  Laterality: Bilateral;     Family History   Problem Relation Name Age of Onset    Heart disease Father      Seizures Sister       Social History     Tobacco Use    Smoking status: Never     Passive exposure: Never   Substance Use Topics    Alcohol use: Yes     Comment: OCC    Drug use: Never        Review of Systems:  Review of Systems   Constitutional:  Negative for activity change, appetite change, chills, fatigue, fever and unexpected weight change.   HENT:  Negative for congestion, ear pain, sore throat and trouble swallowing.    Eyes:  Negative for pain, redness and itching.   Respiratory:  Negative for cough, shortness of breath and wheezing.    Cardiovascular:  Negative for chest pain, palpitations and leg swelling.   Gastrointestinal:  Negative for abdominal distention, abdominal pain, anal bleeding, blood in stool, constipation, diarrhea, nausea, rectal pain and vomiting.   Endocrine: Negative for cold intolerance, heat intolerance and polyuria.   Genitourinary:  Negative for dysuria, flank pain, frequency and hematuria.   Musculoskeletal:  Negative for gait problem, joint swelling and neck pain.   Skin:  Negative for color change, rash and wound.   Allergic/Immunologic: Negative for environmental allergies and immunocompromised state.   Neurological:  Negative for dizziness, speech difficulty, weakness and numbness.   Psychiatric/Behavioral:  Negative for agitation, confusion and hallucinations.           Objective     Vital  Signs (Most Recent)  Temp: 98.3 °F (36.8 °C) (06/24/24 1330)  Pulse: 82 (06/24/24 1330)  BP: 129/81 (06/24/24 1330)  SpO2: 97 % (06/24/24 1330)     91.8 kg (202 lb 7.9 oz)     Physical Exam:  Physical Exam  Vitals reviewed.   Constitutional:       General: She is not in acute distress.     Appearance: Normal appearance. She is well-developed. She is not ill-appearing or toxic-appearing.   HENT:      Head: Normocephalic and atraumatic.      Right Ear: External ear normal.      Left Ear: External ear normal.      Nose: Nose normal.   Cardiovascular:      Rate and Rhythm: Normal rate.   Pulmonary:      Effort: Pulmonary effort is normal. No respiratory distress.   Abdominal:      General: Abdomen is flat. There is no distension.      Palpations: Abdomen is soft.      Tenderness: There is no abdominal tenderness.      Comments: No previous incisions   Musculoskeletal:         General: Normal range of motion.      Cervical back: Normal range of motion and neck supple.   Skin:     General: Skin is warm and dry.   Neurological:      Mental Status: She is alert and oriented to person, place, and time.   Psychiatric:         Mood and Affect: Mood normal.         Behavior: Behavior normal.       Diagnostic Results:  EXAMINATION:  CT ABDOMEN PELVIS WITH IV CONTRAST     CLINICAL HISTORY:  Abdominal pain, acute, nonlocalized;     TECHNIQUE:  Low dose axial images, sagittal and coronal reformations were obtained from the lung bases to the pubic symphysis following the IV administration of 100 mL of Omnipaque 350 and rectal contrast     COMPARISON:  Earlier imaging same date noncontrast     Impression:     Again there is regional mural thickening, underlying diverticulosis surrounding stranding and mesenteric prominent lymph nodes at the distal descending colon.  No overt perforation.  Associated mesenteric phlegmon possible 2 cm.  Again findings may be inflammatory related to diverticulitis versus neoplastic.     Scant ascites  present.     No obstructive bowel findings.     No sizable liver lesion     No adrenal gland mass     Pancreas, spleen and kidneys stable unremarkable.     Urinary bladder decompressed       Assessment and Plan     38 y.o. female with descending diverticulitis with mesenteric phlegmon     -discussed pathophysiology of diverticulitis   -discussed complicated vs uncomplicated diverticulitis, as well as treatment options and management  -encouraged high fiber diet  -recommend colonoscopy in near future given previous colonoscopy >1yr ago and suspicious CT scan. Plan for colonoscopy 07/17/24. Suprep to pharmacy   -RTC 6 weeks     Chris Rosenthal MD  Colon and Rectal Surgery  [facility]

## 2024-07-17 ENCOUNTER — ANESTHESIA (OUTPATIENT)
Dept: ENDOSCOPY | Facility: HOSPITAL | Age: 39
End: 2024-07-17
Payer: OTHER GOVERNMENT

## 2024-07-17 ENCOUNTER — HOSPITAL ENCOUNTER (OUTPATIENT)
Facility: HOSPITAL | Age: 39
Discharge: HOME | End: 2024-07-17
Attending: COLON & RECTAL SURGERY | Admitting: COLON & RECTAL SURGERY
Payer: OTHER GOVERNMENT

## 2024-07-17 ENCOUNTER — ANESTHESIA EVENT (OUTPATIENT)
Dept: ENDOSCOPY | Facility: HOSPITAL | Age: 39
End: 2024-07-17
Payer: OTHER GOVERNMENT

## 2024-07-17 DIAGNOSIS — Z12.11 SCREENING FOR COLON CANCER: ICD-10-CM

## 2024-07-17 PROCEDURE — 37000008 HC ANESTHESIA 1ST 15 MINUTES: Performed by: COLON & RECTAL SURGERY

## 2024-07-17 PROCEDURE — 63600175 PHARM REV CODE 636 W HCPCS: Performed by: STUDENT IN AN ORGANIZED HEALTH CARE EDUCATION/TRAINING PROGRAM

## 2024-07-17 PROCEDURE — 25000003 PHARM REV CODE 250: Performed by: STUDENT IN AN ORGANIZED HEALTH CARE EDUCATION/TRAINING PROGRAM

## 2024-07-17 PROCEDURE — 45378 DIAGNOSTIC COLONOSCOPY: CPT | Performed by: COLON & RECTAL SURGERY

## 2024-07-17 PROCEDURE — 45378 DIAGNOSTIC COLONOSCOPY: CPT | Mod: ,,, | Performed by: COLON & RECTAL SURGERY

## 2024-07-17 PROCEDURE — 37000009 HC ANESTHESIA EA ADD 15 MINS: Performed by: COLON & RECTAL SURGERY

## 2024-07-17 RX ORDER — SODIUM CHLORIDE, SODIUM LACTATE, POTASSIUM CHLORIDE, CALCIUM CHLORIDE 600; 310; 30; 20 MG/100ML; MG/100ML; MG/100ML; MG/100ML
INJECTION, SOLUTION INTRAVENOUS CONTINUOUS PRN
Status: DISCONTINUED | OUTPATIENT
Start: 2024-07-17 | End: 2024-07-17

## 2024-07-17 RX ORDER — PROPOFOL 10 MG/ML
VIAL (ML) INTRAVENOUS
Status: DISCONTINUED | OUTPATIENT
Start: 2024-07-17 | End: 2024-07-17

## 2024-07-17 RX ORDER — LIDOCAINE HYDROCHLORIDE 10 MG/ML
INJECTION, SOLUTION EPIDURAL; INFILTRATION; INTRACAUDAL; PERINEURAL
Status: DISCONTINUED | OUTPATIENT
Start: 2024-07-17 | End: 2024-07-17

## 2024-07-17 RX ADMIN — PROPOFOL 50 MG: 10 INJECTION, EMULSION INTRAVENOUS at 08:07

## 2024-07-17 RX ADMIN — PROPOFOL 50 MG: 10 INJECTION, EMULSION INTRAVENOUS at 09:07

## 2024-07-17 RX ADMIN — LIDOCAINE HYDROCHLORIDE 50 MG: 10 SOLUTION INTRAVENOUS at 08:07

## 2024-07-17 RX ADMIN — SODIUM CHLORIDE, SODIUM LACTATE, POTASSIUM CHLORIDE, AND CALCIUM CHLORIDE: 600; 310; 30; 20 INJECTION, SOLUTION INTRAVENOUS at 08:07

## 2024-07-17 RX ADMIN — PROPOFOL 100 MG: 10 INJECTION, EMULSION INTRAVENOUS at 08:07

## 2024-07-17 NOTE — TRANSFER OF CARE
"Anesthesia Transfer of Care Note    Patient: Suri Hameed    Procedure(s) Performed: Procedure(s) (LRB):  COLONOSCOPY (N/A)    Patient location: PACU    Anesthesia Type: MAC    Transport from OR: Transported from OR on room air with adequate spontaneous ventilation    Post pain: adequate analgesia    Post assessment: no apparent anesthetic complications    Post vital signs: stable    Level of consciousness: responds to stimulation and awake    Nausea/Vomiting: no nausea/vomiting    Complications: none    Transfer of care protocol was followedComments: Report given to PACU RN at bedside. Hand off tool used. RN given opportunity to ask questions or clarify concerns. No Concerns verbalized. RN was asked if ready to assume care of patient. RN verbally confirmed. Pt. left in stable condition. SV. Vital Signs Return to Near Baseline. No s/s of distress noted.       Last vitals: Visit Vitals  /70 (BP Location: Left arm, Patient Position: Lying)   Pulse 79   Temp 36.7 °C (98.1 °F) (Temporal)   Resp 18   Ht 5' 9" (1.753 m)   Wt 91.6 kg (202 lb)   SpO2 99%   Breastfeeding No   BMI 29.83 kg/m²     "

## 2024-07-17 NOTE — BRIEF OP NOTE
[facility] ([facility]ospital)  Brief Operative Note     SUMMARY     Surgery Date: 7/17/2024     Surgeons and Role:     * Chris Rosenthal MD - Primary    Assisting Surgeon: None    Pre-op Diagnosis:  Diverticulitis [K57.92]    Post-op Diagnosis:  Post-Op Diagnosis Codes:     * Diverticulitis [K57.92]    Procedure(s) (LRB):  COLONOSCOPY (N/A)    Anesthesia: Choice    Description of the findings of the procedure: poor prep, no masses or lesions    Estimated Blood Loss: * No values recorded between 7/17/2024  8:53 AM and 7/17/2024  9:09 AM *         Specimens:   Specimen (24h ago, onward)      None            Discharge Note    SUMMARY     Admit Date: 7/17/2024    Discharge Date and Time: 7/17/2024 9:09 AM    Hospital Course Patient was seen in the preoperative area by both myself and anesthesia. All consents were verified and all questions appropriately answered. All risks, benefits and alternatives explained to patient. Patient proceeded to endoscopy suite for colonoscopy and was discharged home postoperative once cleared by anesthesia.    Final Diagnosis: Post-Op Diagnosis Codes:     * Diverticulitis [K57.92]    Disposition: Home or Self Care    Follow Up/Patient Instructions: See Provation report    Medications:  Reconciled Home Medications:      Medication List        CONTINUE taking these medications      albuterol 90 mcg/actuation inhaler  Commonly known as: PROVENTIL/VENTOLIN HFA  INHALE 2 PUFFS BY MOUTH FOUR TIMES A DAY AS NEEDED FOR ASTHMA ATTACK     fexofenadine 180 MG tablet  Commonly known as: ALLEGRA  180 mg.     fluticasone propionate 50 mcg/actuation nasal spray  Commonly known as: FLONASE  INSTILL 1 SPRAY IN EACH NOSTRIL EVERY DAY FOR ALLERGIES     HYDROcodone-acetaminophen 5-325 mg per tablet  Commonly known as: NORCO  Take 1 tablet by mouth every 6 (six) hours as needed for Pain.            Discharge Procedure Orders   Diet general     Call MD for:  temperature >100.4     Call MD for:  persistent  nausea and vomiting     Call MD for:  severe uncontrolled pain     Call MD for:  difficulty breathing, headache or visual disturbances     Call MD for:  redness, tenderness, or signs of infection (pain, swelling, redness, odor or green/yellow discharge around incision site)     Call MD for:  hives     Call MD for:  persistent dizziness or light-headedness     Call MD for:  extreme fatigue     Activity as tolerated      Follow-up Information       [facility], [facility] Follow up.    Why: As needed  Contact information:  [address] [facility] [address] [phone]

## 2024-07-17 NOTE — ANESTHESIA POSTPROCEDURE EVALUATION
Anesthesia Post Evaluation    Patient: Suri Hawkinsgh    Procedure(s) Performed: Procedure(s) (LRB):  COLONOSCOPY (N/A)    Final Anesthesia Type: MAC      Patient location during evaluation: PACU  Patient participation: Yes- Able to Participate  Level of consciousness: awake and alert and oriented  Post-procedure vital signs: reviewed and stable  Pain management: adequate  Airway patency: patent  RONNA mitigation strategies: Multimodal analgesia and Extubation and recovery carried out in lateral, semiupright, or other nonsupine position  PONV status at discharge: No PONV  Anesthetic complications: no      Cardiovascular status: blood pressure returned to baseline and hemodynamically stable  Respiratory status: unassisted and spontaneous ventilation  Hydration status: euvolemic  Follow-up not needed.  Comments: Report given to PACU RN. Hand Off Tool Used. RN given opportunity to ask questions or clarify concerns. No Concerns verbalized. Pt. Left in stable condition. SV. Vital Signs Return to Near Baseline. No s/s of distress noted.               Vitals Value Taken Time   /70 07/17/24 0733   Temp 36.7 °C (98.1 °F) 07/17/24 0733   Pulse 79 07/17/24 0733   Resp 18 07/17/24 0733   SpO2 99 % 07/17/24 0733         No case tracking events are documented in the log.      Pain/Mykel Score: No data recorded

## 2024-07-17 NOTE — PROVATION PATIENT INSTRUCTIONS
Discharge Summary/Instructions after an Endoscopic Procedure  Patient Name: Suri Hameed  Patient MRN: [id number]  Patient YOB: 1985 Wednesday, July 17, 2024 Chris Rosenthal MD  Dear patient,  As a result of recent federal legislation (The Federal Cures Act), you may   receive lab or pathology results from your procedure in your [facility]   account before your physician is able to contact you. Your physician or   their representative will relay the results to you with their   recommendations at their soonest availability.  Thank you,  RESTRICTIONS:  During your procedure today, you received medications for sedation.  These   medications may affect your judgment, balance and coordination.  Therefore,   for 24 hours, you have the following restrictions:   - DO NOT drive a car, operate machinery, make legal/financial decisions,   sign important papers or drink alcohol.    ACTIVITY:  Today: no heavy lifting, straining or running due to procedural   sedation/anesthesia.  The following day: return to full activity including work.  DIET:  Eat and drink normally unless instructed otherwise.     TREATMENT FOR COMMON SIDE EFFECTS:  - Mild abdominal pain, nausea, belching, bloating or excessive gas:  rest,   eat lightly and use a heating pad.  - Sore Throat: treat with throat lozenges and/or gargle with warm salt   water.  - Because air was used during the procedure, expelling large amounts of air   from your rectum or belching is normal.  - If a bowel prep was taken, you may not have a bowel movement for 1-3 days.    This is normal.  SYMPTOMS TO WATCH FOR AND REPORT TO YOUR PHYSICIAN:  1. Abdominal pain or bloating, other than gas cramps.  2. Chest pain.  3. Back pain.  4. Signs of infection such as: chills or fever occurring within 24 hours   after the procedure.  5. Rectal bleeding, which would show as bright red, maroon, or black stools.   (A tablespoon of blood from the rectum is not serious, especially if    hemorrhoids are present.)  6. Vomiting.  7. Weakness or dizziness.  GO DIRECTLY TO THE NEAREST EMERGENCY ROOM IF YOU HAVE ANY OF THE FOLLOWING:      Difficulty breathing              Chills and/or fever over 101 F   Persistent vomiting and/or vomiting blood   Severe abdominal pain   Severe chest pain   Black, tarry stools   Bleeding- more than one tablespoon   Any other symptom or condition that you feel may need urgent attention  Your doctor recommends these additional instructions:  If any biopsies were taken, your doctors clinic will contact you in 1 to 2   weeks with any results.  - Discharge patient to home.   - High fiber diet.   - Continue present medications.   - Repeat colonoscopy in 1 year because the bowel preparation was suboptimal.     - Return to my office as previously scheduled.  For questions, problems or results please call your physician Chris Rosenthal MD at Work:  [phone]  If you have any questions about the above instructions, call the GI   department at [phone] or call the endoscopy unit at [phone]   from 7am until 3 pm.  [facility] [place], EMERGENCY ROOM PHONE NUMBER:   [phone]  IF A COMPLICATION OR EMERGENCY SITUATION ARISES AND YOU ARE UNABLE TO REACH   YOUR PHYSICIAN - GO DIRECTLY TO THE EMERGENCY ROOM.  I have read or have had read to me these discharge instructions for my   procedure and have received a written copy.  I understand these   instructions and will follow-up with my physician if I have any questions.     __________________________________       _____________________________________  Nurse Signature                                          Patient/Designated   Responsible Party Signature  MD Chris Sy MD  7/17/2024 9:15:00 AM  This report has been verified and signed electronically.  Dear patient,  As a result of recent federal legislation (The Federal Cures Act), you may   receive lab or pathology  results from your procedure in your [facility]   account before your physician is able to contact you. Your physician or   their representative will relay the results to you with their   recommendations at their soonest availability.  Thank you,  [employer]ATION

## 2024-07-17 NOTE — ANESTHESIA PREPROCEDURE EVALUATION
07/17/2024  Suri Hameed is a 39 y.o., female.      Pre-op Assessment    I have reviewed the Patient Summary Reports.     I have reviewed the Nursing Notes. I have reviewed the NPO Status.   I have reviewed the Medications.     Review of Systems  EENT/Dental:            Chronic Tonsillitis    Cardiovascular:  Cardiovascular Normal                                            Pulmonary:    Asthma    Sleep Apnea   Asthma:    Obstructive Sleep Apnea (RONNA).           Renal/:  Renal/ Normal                 Hepatic/GI:  Hepatic/GI Normal                     Physical Exam  General: Well nourished, Cooperative, Alert and Oriented    Airway:  Mallampati: II / II  Mouth Opening: Normal  TM Distance: Normal  Tongue: Normal  Neck ROM: Normal ROM    Dental:  Intact    Chest/Lungs:  Normal Respiratory Rate    Heart:  Rate: Normal  Rhythm: Regular Rhythm        Anesthesia Plan  Type of Anesthesia, risks & benefits discussed:    Anesthesia Type: MAC  Intra-op Monitoring Plan: Standard ASA Monitors  Post Op Pain Control Plan: multimodal analgesia and IV/PO Opioids PRN  Induction:  IV  Informed Consent: Informed consent signed with the Patient and all parties understand the risks and agree with anesthesia plan.  All questions answered. Patient consented to blood products? No  ASA Score: 2  Day of Surgery Review of History & Physical: H&P Update referred to the surgeon/provider.    Ready For Surgery From Anesthesia Perspective.     .

## 2024-07-18 VITALS
DIASTOLIC BLOOD PRESSURE: 73 MMHG | HEART RATE: 72 BPM | SYSTOLIC BLOOD PRESSURE: 125 MMHG | TEMPERATURE: 98 F | HEIGHT: 69 IN | BODY MASS INDEX: 29.92 KG/M2 | OXYGEN SATURATION: 100 % | RESPIRATION RATE: 16 BRPM | WEIGHT: 202 LBS

## 2024-07-19 ENCOUNTER — TELEPHONE (OUTPATIENT)
Dept: SURGERY | Facility: CLINIC | Age: 39
End: 2024-07-19
Payer: OTHER GOVERNMENT

## 2024-07-19 NOTE — TELEPHONE ENCOUNTER
RN called pt back in regards to message left. Pt wanted to schedule surgery. RN informed clinic appt on 8/5 is to discuss surgery. Pt will keep 8/5 appt. Pt verbalized understanding and has no further questions at this time.     ----- Message from Franchesca Costa sent at 7/19/2024  2:25 PM CDT -----  Contact: Surijacklyn Crossge is requesting a call back from the nurse to get her next procedure after her colonoscopy scheduled sooner. Please give her a call at [phone]

## 2024-08-05 ENCOUNTER — OFFICE VISIT (OUTPATIENT)
Dept: SURGERY | Facility: CLINIC | Age: 39
End: 2024-08-05
Payer: OTHER GOVERNMENT

## 2024-08-05 ENCOUNTER — LAB VISIT (OUTPATIENT)
Dept: LAB | Facility: HOSPITAL | Age: 39
End: 2024-08-05
Payer: OTHER GOVERNMENT

## 2024-08-05 VITALS
TEMPERATURE: 99 F | OXYGEN SATURATION: 97 % | DIASTOLIC BLOOD PRESSURE: 72 MMHG | WEIGHT: 200.06 LBS | HEIGHT: 69 IN | SYSTOLIC BLOOD PRESSURE: 111 MMHG | HEART RATE: 86 BPM | BODY MASS INDEX: 29.63 KG/M2

## 2024-08-05 DIAGNOSIS — R93.5 ABNORMAL CT OF THE ABDOMEN: ICD-10-CM

## 2024-08-05 DIAGNOSIS — K57.92 DIVERTICULITIS: Primary | ICD-10-CM

## 2024-08-05 DIAGNOSIS — K57.92 DIVERTICULITIS: ICD-10-CM

## 2024-08-05 LAB
ALBUMIN SERPL BCP-MCNC: 4 G/DL (ref 3.5–5.2)
ALP SERPL-CCNC: 59 U/L (ref 55–135)
ALT SERPL W/O P-5'-P-CCNC: 16 U/L (ref 10–44)
ANION GAP SERPL CALC-SCNC: 10 MMOL/L (ref 8–16)
AST SERPL-CCNC: 23 U/L (ref 10–40)
BASOPHILS # BLD AUTO: 0.06 K/UL (ref 0–0.2)
BASOPHILS NFR BLD: 0.9 % (ref 0–1.9)
BILIRUB SERPL-MCNC: 0.3 MG/DL (ref 0.1–1)
BUN SERPL-MCNC: 15 MG/DL (ref 6–20)
CALCIUM SERPL-MCNC: 10 MG/DL (ref 8.7–10.5)
CHLORIDE SERPL-SCNC: 108 MMOL/L (ref 95–110)
CO2 SERPL-SCNC: 22 MMOL/L (ref 23–29)
CREAT SERPL-MCNC: 1 MG/DL (ref 0.5–1.4)
DIFFERENTIAL METHOD BLD: ABNORMAL
EOSINOPHIL # BLD AUTO: 0.2 K/UL (ref 0–0.5)
EOSINOPHIL NFR BLD: 2.4 % (ref 0–8)
ERYTHROCYTE [DISTWIDTH] IN BLOOD BY AUTOMATED COUNT: 14.6 % (ref 11.5–14.5)
EST. GFR  (NO RACE VARIABLE): >60 ML/MIN/1.73 M^2
GLUCOSE SERPL-MCNC: 74 MG/DL (ref 70–110)
HCT VFR BLD AUTO: 41 % (ref 37–48.5)
HGB BLD-MCNC: 11.9 G/DL (ref 12–16)
IMM GRANULOCYTES # BLD AUTO: 0.01 K/UL (ref 0–0.04)
IMM GRANULOCYTES NFR BLD AUTO: 0.2 % (ref 0–0.5)
LYMPHOCYTES # BLD AUTO: 3.7 K/UL (ref 1–4.8)
LYMPHOCYTES NFR BLD: 54.9 % (ref 18–48)
MCH RBC QN AUTO: 25.1 PG (ref 27–31)
MCHC RBC AUTO-ENTMCNC: 29 G/DL (ref 32–36)
MCV RBC AUTO: 87 FL (ref 82–98)
MONOCYTES # BLD AUTO: 0.6 K/UL (ref 0.3–1)
MONOCYTES NFR BLD: 9.2 % (ref 4–15)
NEUTROPHILS # BLD AUTO: 2.2 K/UL (ref 1.8–7.7)
NEUTROPHILS NFR BLD: 32.4 % (ref 38–73)
NRBC BLD-RTO: 0 /100 WBC
PLATELET # BLD AUTO: 380 K/UL (ref 150–450)
PMV BLD AUTO: 11 FL (ref 9.2–12.9)
POTASSIUM SERPL-SCNC: 4.1 MMOL/L (ref 3.5–5.1)
PROT SERPL-MCNC: 8.2 G/DL (ref 6–8.4)
RBC # BLD AUTO: 4.74 M/UL (ref 4–5.4)
SODIUM SERPL-SCNC: 140 MMOL/L (ref 136–145)
WBC # BLD AUTO: 6.65 K/UL (ref 3.9–12.7)

## 2024-08-05 PROCEDURE — 99214 OFFICE O/P EST MOD 30 MIN: CPT | Mod: S$PBB,,, | Performed by: COLON & RECTAL SURGERY

## 2024-08-05 PROCEDURE — 99215 OFFICE O/P EST HI 40 MIN: CPT | Mod: PBBFAC | Performed by: COLON & RECTAL SURGERY

## 2024-08-05 PROCEDURE — 80053 COMPREHEN METABOLIC PANEL: CPT

## 2024-08-05 PROCEDURE — 85025 COMPLETE CBC W/AUTO DIFF WBC: CPT

## 2024-08-05 PROCEDURE — 36415 COLL VENOUS BLD VENIPUNCTURE: CPT

## 2024-08-05 PROCEDURE — 99999 PR PBB SHADOW E&M-EST. PATIENT-LVL V: CPT | Mod: PBBFAC,,, | Performed by: COLON & RECTAL SURGERY

## 2024-08-05 RX ORDER — METRONIDAZOLE 500 MG/1
500 TABLET ORAL 3 TIMES DAILY
Qty: 3 TABLET | Refills: 0 | Status: SHIPPED | OUTPATIENT
Start: 2024-08-05

## 2024-08-05 RX ORDER — NEOMYCIN SULFATE 500 MG/1
1000 TABLET ORAL 3 TIMES DAILY
Qty: 6 TABLET | Refills: 0 | Status: SHIPPED | OUTPATIENT
Start: 2024-08-05 | End: 2024-08-05 | Stop reason: SDUPTHER

## 2024-08-05 RX ORDER — METRONIDAZOLE 500 MG/100ML
500 INJECTION, SOLUTION INTRAVENOUS
OUTPATIENT
Start: 2024-08-05

## 2024-08-05 RX ORDER — NEOMYCIN SULFATE 500 MG/1
1000 TABLET ORAL 3 TIMES DAILY
Qty: 6 TABLET | Refills: 0 | Status: SHIPPED | OUTPATIENT
Start: 2024-08-05

## 2024-08-05 RX ORDER — HEPARIN SODIUM 5000 [USP'U]/ML
5000 INJECTION, SOLUTION INTRAVENOUS; SUBCUTANEOUS
OUTPATIENT
Start: 2024-08-05 | End: 2024-08-06

## 2024-08-05 RX ORDER — POLYETHYLENE GLYCOL 3350 17 G/17G
238 POWDER, FOR SOLUTION ORAL DAILY
Qty: 1 EACH | Refills: 0 | Status: SHIPPED | OUTPATIENT
Start: 2024-08-05 | End: 2024-08-05 | Stop reason: SDUPTHER

## 2024-08-05 RX ORDER — ACETAMINOPHEN 500 MG
1000 TABLET ORAL ONCE
OUTPATIENT
Start: 2024-08-05 | End: 2024-08-05

## 2024-08-05 RX ORDER — SODIUM CHLORIDE, SODIUM LACTATE, POTASSIUM CHLORIDE, CALCIUM CHLORIDE 600; 310; 30; 20 MG/100ML; MG/100ML; MG/100ML; MG/100ML
INJECTION, SOLUTION INTRAVENOUS CONTINUOUS
OUTPATIENT
Start: 2024-08-05

## 2024-08-05 RX ORDER — POLYETHYLENE GLYCOL 3350 17 G/17G
238 POWDER, FOR SOLUTION ORAL DAILY
Qty: 1 EACH | Refills: 0 | Status: SHIPPED | OUTPATIENT
Start: 2024-08-05

## 2024-08-05 RX ORDER — CELECOXIB 100 MG/1
400 CAPSULE ORAL
OUTPATIENT
Start: 2024-08-05 | End: 2024-08-05

## 2024-08-05 RX ORDER — GABAPENTIN 400 MG/1
800 CAPSULE ORAL
OUTPATIENT
Start: 2024-08-05 | End: 2024-08-05

## 2024-08-05 RX ORDER — ONDANSETRON HYDROCHLORIDE 2 MG/ML
4 INJECTION, SOLUTION INTRAVENOUS EVERY 12 HOURS PRN
OUTPATIENT
Start: 2024-08-05

## 2024-08-05 RX ORDER — METRONIDAZOLE 500 MG/1
500 TABLET ORAL 3 TIMES DAILY
Qty: 3 TABLET | Refills: 0 | Status: SHIPPED | OUTPATIENT
Start: 2024-08-05 | End: 2024-08-05 | Stop reason: SDUPTHER

## 2024-08-05 NOTE — H&P (VIEW-ONLY)
History & Physical    Subjective     CC: diverticulitis     History of Present Illness:  Patient is a 39 y.o. female presents with for hospital follow up s/p diverticulitis.  She originally presented to the ED for evaluation of LLQ abdominal pain.  Workup in ED was concerning for leukocytosis of 12k and a CT scan concerning for a phlegmon near the descending colon concerning for diverticulitis versus malignancy. There was an associated phlegmon in the mesentery. She was admitted to the hospital medical service and surgery was consulted for evaluation.  She stayed for about 2 days with IV antibiotics and bowel rest.  Was later discharged with p.o. antibiotics which she has completed.  She has no longer having any abdominal pain.  Reports previous history of diverticulitis requiring admission in [place] for IV antibiotics but did not require any percutaneous drainage or surgical intervention.  Otherwise, had several episodes of diverticulitis before that did not require hospitalization. She reports having colonoscopy in [place], [place] in 2023 where diverticulosis was seen as well as one polyp was removed. Denies any previous abdominal surgical history. Denies any fever, chills, nausea, vomiting, hematochezia or melena.  Since discharge, she has been tolerating a regular diet and not having any further abdominal pain; having normal bowel movements.    Interval History:  Since the last clinic visit, the patient had a colonoscopy showing diverticulosis but with poor prep with repeat recommended 1 year, no masses or lesions found. She has not had any further abdominal pain. She is tolerating a regular diet and having normal bowel movements. Denies nausea, vomiting, fevers, chills. She presents to discuss scheduling elective sigmoidectomy to prevent future diverticulitis attacks.      Review of patient's allergies indicates:  No Known Allergies    Current Outpatient Medications   Medication Sig Dispense Refill     albuterol (PROVENTIL/VENTOLIN HFA) 90 mcg/actuation inhaler INHALE 2 PUFFS BY MOUTH FOUR TIMES A DAY AS NEEDED FOR ASTHMA ATTACK      fexofenadine (ALLEGRA) 180 MG tablet 180 mg.      fluticasone propionate (FLONASE) 50 mcg/actuation nasal spray INSTILL 1 SPRAY IN EACH NOSTRIL EVERY DAY FOR ALLERGIES      HYDROcodone-acetaminophen (NORCO) 5-325 mg per tablet Take 1 tablet by mouth every 6 (six) hours as needed for Pain. 15 tablet 0    metroNIDAZOLE (FLAGYL) 500 MG tablet Take 1 tablet (500 mg total) by mouth 3 (three) times daily. Take initial dose@2pm, second dose@3pm and final dose@10pm day before surgery 3 tablet 0    neomycin (MYCIFRADIN) 500 mg Tab Take 2 tablets (1,000 mg total) by mouth 3 (three) times daily. Take 1st dose@2pm,take 2nd dose@3pm, take last dose@10pm day before surgery 6 tablet 0    polyethylene glycol (GLYCOLAX) 17 gram/dose powder Take 238 g by mouth once daily. Mix with 2L of gatorade, drink all mixed solution starting@12pm day before surgery, finish by 10pm 1 each 0     No current facility-administered medications for this visit.       Past Medical History:   Diagnosis Date    Mild intermittent asthma, uncomplicated     Sleep apnea      Past Surgical History:   Procedure Laterality Date    COLONOSCOPY N/A 7/17/2024    Procedure: COLONOSCOPY;  Surgeon: Chris Rosenthal MD;  Location: [facility];  Service: General;  Laterality: N/A;    TONSILLECTOMY Bilateral 4/5/2024    Procedure: TONSILLECTOMY;  Surgeon: Juan M Cevallos MD;  Location: [facility] OR;  Service: ENT;  Laterality: Bilateral;     Family History   Problem Relation Name Age of Onset    Heart disease Father      Seizures Sister       Social History     Tobacco Use    Smoking status: Never     Passive exposure: Never   Substance Use Topics    Alcohol use: Yes     Comment: OCC    Drug use: Never        Review of Systems:  Review of Systems   Constitutional:  Negative for activity change, appetite change, chills, fatigue, fever and  "unexpected weight change.   HENT:  Negative for congestion, ear pain, sore throat and trouble swallowing.    Eyes:  Negative for pain, redness and itching.   Respiratory:  Negative for cough, shortness of breath and wheezing.    Cardiovascular:  Negative for chest pain, palpitations and leg swelling.   Gastrointestinal:  Negative for abdominal distention, abdominal pain, anal bleeding, blood in stool, constipation, diarrhea, nausea, rectal pain and vomiting.   Endocrine: Negative for cold intolerance, heat intolerance and polyuria.   Genitourinary:  Negative for dysuria, flank pain, frequency and hematuria.   Musculoskeletal:  Negative for gait problem, joint swelling and neck pain.   Skin:  Negative for color change, rash and wound.   Allergic/Immunologic: Negative for environmental allergies and immunocompromised state.   Neurological:  Negative for dizziness, speech difficulty, weakness and numbness.   Psychiatric/Behavioral:  Negative for agitation, confusion and hallucinations.           Objective     Vital Signs (Most Recent)  Temp: 98.5 °F (36.9 °C) (08/05/24 1543)  Pulse: 86 (08/05/24 1543)  BP: 111/72 (08/05/24 1543)  SpO2: 97 % (08/05/24 1543)  5' 9" (1.753 m)  90.8 kg (200 lb 1.1 oz)     Physical Exam:  Physical Exam  Vitals reviewed.   Constitutional:       General: She is not in acute distress.     Appearance: Normal appearance. She is well-developed. She is not ill-appearing or toxic-appearing.   HENT:      Head: Normocephalic and atraumatic.      Right Ear: External ear normal.      Left Ear: External ear normal.      Nose: Nose normal.   Cardiovascular:      Rate and Rhythm: Normal rate.   Pulmonary:      Effort: Pulmonary effort is normal. No respiratory distress.   Abdominal:      General: Abdomen is flat. There is no distension.      Palpations: Abdomen is soft.      Tenderness: There is no abdominal tenderness.      Comments: No previous incisions   Musculoskeletal:         General: Normal range " of motion.      Cervical back: Normal range of motion and neck supple.   Skin:     General: Skin is warm and dry.   Neurological:      Mental Status: She is alert and oriented to person, place, and time.   Psychiatric:         Mood and Affect: Mood normal.         Behavior: Behavior normal.       Diagnostic Results:  Colonoscopy 07/2024: reviewed    EXAMINATION:  CT ABDOMEN PELVIS WITH IV CONTRAST     CLINICAL HISTORY:  Abdominal pain, acute, nonlocalized;     TECHNIQUE:  Low dose axial images, sagittal and coronal reformations were obtained from the lung bases to the pubic symphysis following the IV administration of 100 mL of Omnipaque 350 and rectal contrast     COMPARISON:  Earlier imaging same date noncontrast     Impression:     Again there is regional mural thickening, underlying diverticulosis surrounding stranding and mesenteric prominent lymph nodes at the distal descending colon.  No overt perforation.  Associated mesenteric phlegmon possible 2 cm.  Again findings may be inflammatory related to diverticulitis versus neoplastic.     Scant ascites present.     No obstructive bowel findings.     No sizable liver lesion     No adrenal gland mass     Pancreas, spleen and kidneys stable unremarkable.     Urinary bladder decompressed       Assessment and Plan     39 y.o. female with descending/sigmoid diverticulitis with mesenteric phlegmon     -discussed pathophysiology of diverticulitis   -discussed complicated vs uncomplicated diverticulitis, as well as treatment options and management  -given episode of complicated diverticulitis, recommend elective sigmoidectomy and patient is in agreement with this  -plan for robotic sigmoidectomy 08/27/24, mechanical and oral abx bowel prep. ERAS protocol    -All risks, benefits and alternatives fully explained to patient. Risks include, but are not limited to, bleeding, infection, anastomotic leak, damage to ureter, damage to other intra-abdominal organs such as colon,  rectum, small bowel, stomach, liver, bladder, reproductive organs, sexual dysfunction, urinary dysfunction, postoperative abscess, conversion to open operation, perioperative MI, CVA and death.  All questions field and appropriately answered to patient's satisfaction.  Consent signed and placed on chart.  -will need colonoscopy 1 year due to poor prep   -encouraged high fiber diet  -RTC post op     Chris Rosenthal MD  Colon and Rectal Surgery  [facility]

## 2024-08-19 ENCOUNTER — HOSPITAL ENCOUNTER (OUTPATIENT)
Dept: CARDIOLOGY | Facility: HOSPITAL | Age: 39
Discharge: HOME | End: 2024-08-19
Payer: OTHER GOVERNMENT

## 2024-08-19 ENCOUNTER — HOSPITAL ENCOUNTER (OUTPATIENT)
Dept: RADIOLOGY | Facility: HOSPITAL | Age: 39
Discharge: HOME | End: 2024-08-19
Attending: NURSE PRACTITIONER
Payer: OTHER GOVERNMENT

## 2024-08-19 ENCOUNTER — OFFICE VISIT (OUTPATIENT)
Dept: INTERNAL MEDICINE | Facility: CLINIC | Age: 39
End: 2024-08-19
Payer: OTHER GOVERNMENT

## 2024-08-19 VITALS
TEMPERATURE: 99 F | DIASTOLIC BLOOD PRESSURE: 73 MMHG | SYSTOLIC BLOOD PRESSURE: 107 MMHG | HEART RATE: 94 BPM | OXYGEN SATURATION: 96 %

## 2024-08-19 DIAGNOSIS — Z01.818 PREOP TESTING: ICD-10-CM

## 2024-08-19 DIAGNOSIS — G47.30 SLEEP APNEA, UNSPECIFIED TYPE: ICD-10-CM

## 2024-08-19 DIAGNOSIS — Z01.818 PREOP EXAMINATION: ICD-10-CM

## 2024-08-19 DIAGNOSIS — J45.909 UNCOMPLICATED ASTHMA, UNSPECIFIED ASTHMA SEVERITY, UNSPECIFIED WHETHER PERSISTENT: ICD-10-CM

## 2024-08-19 DIAGNOSIS — K57.92 DIVERTICULITIS: Primary | ICD-10-CM

## 2024-08-19 PROCEDURE — 71045 X-RAY EXAM CHEST 1 VIEW: CPT | Mod: TC

## 2024-08-19 PROCEDURE — 71045 X-RAY EXAM CHEST 1 VIEW: CPT | Mod: 26,,, | Performed by: RADIOLOGY

## 2024-08-19 PROCEDURE — 93010 ELECTROCARDIOGRAM REPORT: CPT | Mod: ,,, | Performed by: INTERNAL MEDICINE

## 2024-08-19 PROCEDURE — 99213 OFFICE O/P EST LOW 20 MIN: CPT | Mod: PBBFAC,25

## 2024-08-19 PROCEDURE — 93005 ELECTROCARDIOGRAM TRACING: CPT

## 2024-08-19 PROCEDURE — 99999 PR PBB SHADOW E&M-EST. PATIENT-LVL III: CPT | Mod: PBBFAC,,,

## 2024-08-19 NOTE — ASSESSMENT & PLAN NOTE
Robotic Sigmoid Colectomy by Dr. Rosenthal on 8/27/24.    Known risk factors for perioperative complications: None    Difficulty with intubation is not anticipated.    Cardiac Risk Estimation: Based on the Revised Cardiac Risk index, patient is a Class risk with a % risk of a major cardiac event in a low risk procedure.    1.) Preoperative workup as follows: chest x-ray, ECG, hemoglobin, hematocrit, electrolytes, creatinine, glucose.  2.) Change in medication regimen before surgery: discontinue NSAIDs (Naproxen) 5 days before surgery,   3.) Prophylaxis for cardiac events with perioperative beta-blockers: not indicated.  4.) Invasive hemodynamic monitoring perioperatively: not indicated.  5.) Deep vein thrombosis prophylaxis postoperatively: regimen to be chosen by surgical team.  6.) Surveillance for postoperative MI with ECG immediately postoperatively and on postoperati ve days 1 and 2 AND troponin levels 24 hours postoperatively and on day 4 or hospital discharge (whichever comes first): at the discretion of anesthesiologist.  7.) Current medications which may produce withdrawal symptoms if withheld perioperatively: Percocet prn  8.) Other measures:  N/A      --Morning of Procedure medications: Flonase, Flagyl,Neomycin  --Hold all other medications morning of surgery fexofenadine  --Resume all medications post-operatively  --Hold ASA, NSAIDs and all vitamins/supplements 7 days prior to procedure

## 2024-08-19 NOTE — ASSESSMENT & PLAN NOTE
She is compliant with cpap machine. Monitor respiratory status perioperatively. She will recover 2-3 nights postoperatively in the hospital.

## 2024-08-19 NOTE — PROGRESS NOTES
Preoperative History and Physical                                                              Hospital Medicine                                                                Chief Complaint: Preoperative evaluation     History of Present Illness:      Suri Hameed is a 39 y.o. female who presents to the office today for a preoperative consultation at the request of Dr. Rosenthal who plans on performing Robotic Sigmoid Colectomy on August 27.     Functional Status:      The patient is able to climb a flight of stairs. The patient is able to ambulate independently without difficulty. The patient's functional status is affected by the surgical problem. The patient's functional status is not affected by shortness of breath, chest pain, dyspnea on exertion and fatigue.    MET score greater than 4    Past Medical History:      Past Medical History:   Diagnosis Date    Mild intermittent asthma, uncomplicated     Sleep apnea         Past Surgical History:      Past Surgical History:   Procedure Laterality Date    COLONOSCOPY N/A 7/17/2024    Procedure: COLONOSCOPY;  Surgeon: Chris Rosenthal MD;  Location: [facility];  Service: General;  Laterality: N/A;    TONSILLECTOMY Bilateral 4/5/2024    Procedure: TONSILLECTOMY;  Surgeon: Juan M Cevallos MD;  Location: [facility];  Service: ENT;  Laterality: Bilateral;        Social History:      Social History     Socioeconomic History    Marital status: Single   Tobacco Use    Smoking status: Never     Passive exposure: Never   Substance and Sexual Activity    Alcohol use: Yes     Comment: OCC    Drug use: Never    Sexual activity: Yes     Social Determinants of Health     Financial Resource Strain: Low Risk  (6/23/2024)    Overall Financial Resource Strain (CARDIA)     Difficulty of Paying Living Expenses: Not hard at all   Food Insecurity: No Food Insecurity (6/23/2024)    Hunger Vital Sign     Worried About Running Out of Food  in the Last Year: Never true     Ran Out of Food in the Last Year: Never true   Transportation Needs: Unmet Transportation Needs (5/5/2024)    PRAPARE - Transportation     Lack of Transportation (Medical): Yes     Lack of Transportation (Non-Medical): No   Physical Activity: Inactive (6/23/2024)    Exercise Vital Sign     Days of Exercise per Week: 0 days     Minutes of Exercise per Session: 0 min   Stress: No Stress Concern Present (6/23/2024)    Citizen of [place] [employer] - Occupational Stress Questionnaire     Feeling of Stress : Only a little   Housing Stability: Unknown (6/23/2024)    Housing Stability Vital Sign     Unable to Pay for Housing in the Last Year: No        Family History:      Family History   Problem Relation Name Age of Onset    Heart disease Father      Seizures Sister         Allergies:      Review of patient's allergies indicates:  No Known Allergies    Medications:      Current Outpatient Medications   Medication Sig    albuterol (PROVENTIL/VENTOLIN HFA) 90 mcg/actuation inhaler INHALE 2 PUFFS BY MOUTH FOUR TIMES A DAY AS NEEDED FOR ASTHMA ATTACK    fexofenadine (ALLEGRA) 180 MG tablet 180 mg.    fluticasone propionate (FLONASE) 50 mcg/actuation nasal spray INSTILL 1 SPRAY IN EACH NOSTRIL EVERY DAY FOR ALLERGIES    HYDROcodone-acetaminophen (NORCO) 5-325 mg per tablet Take 1 tablet by mouth every 6 (six) hours as needed for Pain.    metroNIDAZOLE (FLAGYL) 500 MG tablet Take 1 tablet (500 mg total) by mouth 3 (three) times daily. Take initial dose@2pm, second dose@3pm and final dose@10pm day before surgery    neomycin (MYCIFRADIN) 500 mg Tab Take 2 tablets (1,000 mg total) by mouth 3 (three) times daily. Take 1st dose@2pm,take 2nd dose@3pm, take last dose@10pm day before surgery    polyethylene glycol (GLYCOLAX) 17 gram/dose powder Take 238 g by mouth once daily. Mix with 2L of gatorade, drink all mixed solution starting@12pm day before surgery, finish by 10pm     No current  facility-administered medications for this visit.       Vitals:      Vitals:    08/19/24 1354   BP: 107/73   Pulse: 94   Temp: 98.6 °F (37 °C)     Review of Systems:        Constitutional: Negative for fever, chills, weight loss, malaise/fatigue and diaphoresis.   HENT: Negative for hearing loss, ear pain, nosebleeds, congestion, sore throat, neck pain, tinnitus and ear discharge.    Eyes: Negative for blurred vision, double vision, photophobia, pain, discharge and redness.   Respiratory: Negative for cough, hemoptysis, sputum production, shortness of breath, wheezing and stridor.    Cardiovascular: Negative for chest pain, palpitations, orthopnea, claudication, leg swelling and PND.   Gastrointestinal: Negative for heartburn, nausea, vomiting, abdominal pain, diarrhea, constipation, blood in stool and melena.   Genitourinary: Negative for dysuria, urgency, frequency, hematuria and flank pain.   Musculoskeletal: Negative for myalgias, back pain, joint pain and falls.   Skin: Negative for itching and rash.   Neurological: Negative for dizziness, tingling, tremors, sensory change, speech change, focal weakness, seizures, loss of consciousness, weakness and headaches.   Endo/Heme/Allergies: Negative for environmental allergies and polydipsia. Does not bruise/bleed easily.   Psychiatric/Behavioral: Negative for depression, suicidal ideas, hallucinations, memory loss and substance abuse. The patient is not nervous/anxious and does not have insomnia.    All 14 systems reviewed and negative except as noted above.    Physical Exam:      Constitutional: Appears well-developed, well-nourished and in no acute distress.  Patient is oriented to person, place, and time.   Head: Normocephalic and atraumatic. Mucous membranes moist.  Neck: Neck supple no mass.   Cardiovascular: Normal rate and regular rhythm.  S1 S2 appreciated by ascultation.  Pulmonary/Chest: Effort normal and clear to auscultation bilaterally. No respiratory  "distress.   Abdomen: Soft. Non-tender and non-distended. Bowel sounds are normal.   Neurological: Patient is alert and oriented to person, place and time. Moves all extremities.  Skin: Warm and dry. No lesions.  Extremities: No clubbing, cyanosis or edema.    Laboratory data:      Reviewed and noted in plan where applicable. Please see chart for full laboratory data.    Lab Results   Component Value Date     08/05/2024    K 4.1 08/05/2024     08/05/2024    CO2 22 (L) 08/05/2024    CALCIUM 10.0 08/05/2024    BUN 15 08/05/2024    CREATININE 1.0 08/05/2024    GLU 74 08/05/2024        No results found for: "INR", "PROTIME"    Lab Results   Component Value Date    WBC 6.65 08/05/2024    HGB 11.9 (L) 08/05/2024    HCT 41.0 08/05/2024    MCV 87 08/05/2024     08/05/2024         Predictors of intubation difficulty:       Morbid obesity? no   Anatomically abnormal facies? no   Prominent incisors? no   Receding mandible? no   Short, thick neck? no   Neck range of motion: normal   Dentition: No chipped, loose, or missing teeth.  Based on the Modified Mallampati, patient is a mallampati score: III (soft and hard palate and base of uvula visible)    Cardiographics:      ECG:   Normal sinus rhythm with sinus arrhythmia   Incomplete right bundle branch block   Nonspecific T wave abnormality   Abnormal ECG   No previous ECGs available     Echocardiogram: not indicated    Imaging:      Chest x-ray:   EXAM: XR CHEST 1 VIEW PRE-OP     CLINICAL HISTORY: [Z01.818]-Encounter for other preprocedural examination. TechNotes: UNABLE TO REMOVE NIPPLE PIERCINGS     COMPARISON:  02/20/2024     FINDINGS:  Heart size is normal and lungs are clear bilaterally.  Pulmonary vasculature is normal.     IMPRESSION:  No evidence of acute disease.     Finalized on: 8/19/2024 4:30 PM By:  Todd Mitchell  BRRG# [id number]      2024-08-19 16:32:32.303    PEGGYG    Assessment and Plan:      Diverticulitis  Robotic Sigmoid Colectomy by Dr." Galo on 8/27/24.    Cardiac Evaluation 08/20/24 by Dr. [place]  Good functional status.  Reviewed EKG.  No dynamic ischemic changes.    Okay to undergo her colectomy from cardiac standpoint.  Reviewed all tests and above medical conditions with patient in detail and formulated treatment plan.  Risk factor modification discussed.   Known risk factors for perioperative complications: None    Difficulty with intubation is not anticipated.    Cardiac Risk Estimation: Based on the Revised Cardiac Risk index, patient is a Class risk with a % risk of a major cardiac event in a low risk procedure. EKG reviewed: NSR with arrhythmia and incomplete right heart block. Will need cardiac clearance. Cardiology referral sent.     1.) Preoperative workup as follows: chest x-ray, ECG, hemoglobin, hematocrit, electrolytes, creatinine, glucose.  2.) Change in medication regimen before surgery: discontinue NSAIDs (Naproxen) 5 days before surgery,   3.) Prophylaxis for cardiac events with perioperative beta-blockers: not indicated.  4.) Invasive hemodynamic monitoring perioperatively: not indicated.  5.) Deep vein thrombosis prophylaxis postoperatively: regimen to be chosen by surgical team.  6.) Surveillance for postoperative MI with ECG immediately postoperatively and on postoperati ve days 1 and 2 AND troponin levels 24 hours postoperatively and on day 4 or hospital discharge (whichever comes first): at the discretion of anesthesiologist.  7.) Current medications which may produce withdrawal symptoms if withheld perioperatively: Percocet prn  8.) Other measures:  N/A      --Morning of Procedure medications: Flonase, Flagyl,Neomycin  --Hold all other medications morning of surgery fexofenadine  --Resume all medications post-operatively  --Hold ASA, NSAIDs and all vitamins/supplements 7 days prior to procedure    Sleep apnea  She is compliant with cpap machine. Monitor respiratory status perioperatively. She will recover 2-3 nights  postoperatively in the hospital.    Uncomplicated asthma  She uses albuterol INH as needed. Bronchodilators as needed perioperatively.    Electronically signed by SHANNAN Knight on 8/19/2024 at 2:08 PM.     Time spent seeing patient (greater than 1/2 spent in direct contact) >45  minutes

## 2024-08-19 NOTE — DISCHARGE INSTRUCTIONS
Pre op instructions reviewed with patient face to face during Clinic Visit with Provider, verbalized understanding.    To confirm, Surgery is scheduled on 8/27/24. We will call you after 2pm the day before Surgery with your arrival time.    *Please report to the [facility] (1st Floor) located off of [facility] (2nd Entrance/Building on the left, in front of the flag pole).  Address: [address] [place] [address] [place] [address]      !!!INSTRUCTIONS IMPORTANT!!!  DO NOT Eat, Drink, or Smoke after 12 midnight unless instructed otherwise by your Surgeon. OK to brush teeth, no gum, candy or mints!    May have light breakfast day before surgery, but ONLY CLEAR LIQUIDS starting at 12 Noon. NO SOLID FOOD AFTER STARTING BOWEL PREP!  Take antibiotics day before surgery. Start bowel prep day before surgery at 12pm, finish by 10pm. Nothing after Midnight!      metronidazole------- 500 mg Oral 3 times daily, Take initial dose@2pm, second dose@3pm and final dose@10pm day before surgery    neomycin sulfate -1,000 mg Oral 3 times daily, Take initial dose@2pm, second dose@3pm and final dose@10pm day before surgery    polyethylene glycol 3350 238 g Oral Daily, Mix with 2L of Gatorade, drink all mixed solution starting@12pm day before surgery, finish by 10pm         MORNING OF SURGERY, drink small sip of water with the following medications instructed by Surgery Pre-Admit Provider:  Flonase       !!!STOP ALL Aspirins, NSAIDS, WEIGHT LOSS INJECTIONS/PILLS, Herbal supplements, & Vitamins 7 DAYS BEFORE SURGERY!!!    ____  Avoid Alcoholic beverages 3 days prior to surgery, as it can thin the blood.  ____  NO Acrylic/fake nails or nail polish worn day of surgery (specifically hand/arm & foot surgeries).  ____  NO powder, lotions, deodorants, oils or cream on body.  ____  Remove all jewelry, piercings, & foreign objects prior to arrival and leave at home.  ____  Remove Dentures, Hearing Aids & Contact Lens prior to  surgery.  ____  Bring photo ID and insurance information to hospital (Leave Valuables at Home).  ____  If going home the same day, arrange for a ride home. You will not be able to drive for 24 hrs if Anesthesia was used.   ____  Females (ages 11-60): may need to give a urine sample the morning of surgery; please see Pre op Nurse prior to using the restroom.  ____  Males: Stop ED medications (Viagra, Cialis) 24 hrs prior to surgery.  ____  Wear clean, loose fitting clothing to allow for dressings/ bandages.      Bathing Instructions:    -Shower with anti-bacterial Soap (Hibiclens or Dial) the night before surgery & the morning of surgery!   -Do not use Hibiclens soap on your face or genitals.   -Apply clean clothes after shower.  -Do not shave your face or body 2 days prior to surgery unless instructed otherwise by your Surgeon.  -Do not shave pubic hair 7 days prior to surgery (gyn pt's).    [facility]Ride Policy:  Only 2 adults allowed in pre op/recovery area during your procedure. You MUST HAVE A RIDE HOME from a responsible adult that you know and trust. Medical Transport, Uber or Lyft can ONLY be used if patient has a responsible adult to accompany them during ride home.       *Signs and symptoms of Infection Before or After Surgery:               !!!If you experience any fever, chills, nausea/ vomiting, foul odor/ excessive drainage from surgical site, flu-like symptoms, new wounds or cuts, PLEASE CALL THE SURGEON OFFICE at [phone] or SEND MESSAGE THROUGH [employer] PORTAL!!!       *If you are running late day of surgery, please call the Surgery Dept @ [phone].    *Billing question, please call:  [phone] or [phone]       Thank you,  -[facility] Pre Admit [facility].  [phone] or [phone]  M-F 7:30 am-4:00 pm (Closed Major Holidays)    Additional Tests Scheduled Today:   EKG (4th Floor)   Chest xray (1st Floor) Check in at the !

## 2024-08-19 NOTE — PRE-PROCEDURE INSTRUCTIONS
Pre op instructions reviewed with patient face to face during Clinic Visit with Provider, verbalized understanding.    To confirm, Surgery is scheduled on 8/27/24. We will call you after 2pm the day before Surgery with your arrival time.    *Please report to the [facility] (1st Floor) located off of [facility] (2nd Entrance/Building on the left, in front of the flag pole).  Address: [address] [place] [address] [place] [address]      !!!INSTRUCTIONS IMPORTANT!!!  DO NOT Eat, Drink, or Smoke after 12 midnight unless instructed otherwise by your Surgeon. OK to brush teeth, no gum, candy or mints!    May have light breakfast day before surgery, but ONLY CLEAR LIQUIDS starting at 12 Noon. NO SOLID FOOD AFTER STARTING BOWEL PREP!  Take antibiotics day before surgery. Start bowel prep day before surgery at 12pm, finish by 10pm. Nothing after Midnight!      metronidazole------- 500 mg Oral 3 times daily, Take initial dose@2pm, second dose@3pm and final dose@10pm day before surgery    neomycin sulfate -1,000 mg Oral 3 times daily, Take initial dose@2pm, second dose@3pm and final dose@10pm day before surgery    polyethylene glycol 3350 238 g Oral Daily, Mix with 2L of Gatorade, drink all mixed solution starting@12pm day before surgery, finish by 10pm         MORNING OF SURGERY, drink small sip of water with the following medications instructed by Surgery Pre-Admit Provider:  Flonase       !!!STOP ALL Aspirins, NSAIDS, WEIGHT LOSS INJECTIONS/PILLS, Herbal supplements, & Vitamins 7 DAYS BEFORE SURGERY!!!    ____  Avoid Alcoholic beverages 3 days prior to surgery, as it can thin the blood.  ____  NO Acrylic/fake nails or nail polish worn day of surgery (specifically hand/arm & foot surgeries).  ____  NO powder, lotions, deodorants, oils or cream on body.  ____  Remove all jewelry, piercings, & foreign objects prior to arrival and leave at home.  ____  Remove Dentures, Hearing Aids & Contact Lens prior to  surgery.  ____  Bring photo ID and insurance information to hospital (Leave Valuables at Home).  ____  If going home the same day, arrange for a ride home. You will not be able to drive for 24 hrs if Anesthesia was used.   ____  Females (ages 11-60): may need to give a urine sample the morning of surgery; please see Pre op Nurse prior to using the restroom.  ____  Males: Stop ED medications (Viagra, Cialis) 24 hrs prior to surgery.  ____  Wear clean, loose fitting clothing to allow for dressings/ bandages.      Bathing Instructions:    -Shower with anti-bacterial Soap (Hibiclens or Dial) the night before surgery & the morning of surgery!   -Do not use Hibiclens soap on your face or genitals.   -Apply clean clothes after shower.  -Do not shave your face or body 2 days prior to surgery unless instructed otherwise by your Surgeon.  -Do not shave pubic hair 7 days prior to surgery (gyn pt's).    [facility]Ride Policy:  Only 2 adults allowed in pre op/recovery area during your procedure. You MUST HAVE A RIDE HOME from a responsible adult that you know and trust. Medical Transport, Uber or Lyft can ONLY be used if patient has a responsible adult to accompany them during ride home.       *Signs and symptoms of Infection Before or After Surgery:               !!!If you experience any fever, chills, nausea/ vomiting, foul odor/ excessive drainage from surgical site, flu-like symptoms, new wounds or cuts, PLEASE CALL THE SURGEON OFFICE at [phone] or SEND MESSAGE THROUGH [employer] PORTAL!!!       *If you are running late day of surgery, please call the Surgery Dept @ [phone].    *Billing question, please call:  [phone] or [phone]       Thank you,  -[facility] Pre [facility].  [phone] or [phone]  M-F 7:30 am-4:00 pm (Closed Major Holidays)    Additional Tests Scheduled Today:   EKG (4th Floor)   Chest xray (1st Floor) Check in at the !

## 2024-08-20 ENCOUNTER — LAB VISIT (OUTPATIENT)
Dept: LAB | Facility: HOSPITAL | Age: 39
End: 2024-08-20
Attending: INTERNAL MEDICINE
Payer: OTHER GOVERNMENT

## 2024-08-20 ENCOUNTER — OFFICE VISIT (OUTPATIENT)
Dept: CARDIOLOGY | Facility: CLINIC | Age: 39
End: 2024-08-20
Payer: OTHER GOVERNMENT

## 2024-08-20 VITALS
WEIGHT: 205.94 LBS | BODY MASS INDEX: 30.41 KG/M2 | OXYGEN SATURATION: 99 % | SYSTOLIC BLOOD PRESSURE: 116 MMHG | DIASTOLIC BLOOD PRESSURE: 62 MMHG | HEART RATE: 90 BPM

## 2024-08-20 DIAGNOSIS — G47.33 OBSTRUCTIVE SLEEP APNEA ON CPAP: ICD-10-CM

## 2024-08-20 DIAGNOSIS — I45.9 RIGHT VENTRICULAR CONDUCTION DELAY: ICD-10-CM

## 2024-08-20 DIAGNOSIS — Z01.818 PREOP EXAMINATION: ICD-10-CM

## 2024-08-20 DIAGNOSIS — R94.31 ABNORMAL EKG: Primary | ICD-10-CM

## 2024-08-20 LAB
OHS QRS DURATION: 108 MS
OHS QTC CALCULATION: 417 MS

## 2024-08-20 PROCEDURE — 36415 COLL VENOUS BLD VENIPUNCTURE: CPT | Performed by: INTERNAL MEDICINE

## 2024-08-20 PROCEDURE — 99999 PR PBB SHADOW E&M-EST. PATIENT-LVL III: CPT | Mod: PBBFAC,,, | Performed by: INTERNAL MEDICINE

## 2024-08-20 PROCEDURE — 99204 OFFICE O/P NEW MOD 45 MIN: CPT | Mod: S$PBB,,, | Performed by: INTERNAL MEDICINE

## 2024-08-20 PROCEDURE — 99213 OFFICE O/P EST LOW 20 MIN: CPT | Mod: PBBFAC | Performed by: INTERNAL MEDICINE

## 2024-08-20 PROCEDURE — 80061 LIPID PANEL: CPT | Performed by: INTERNAL MEDICINE

## 2024-08-20 NOTE — PROGRESS NOTES
Subjective:   Patient ID:  Suri Hameed is a 39 y.o. female who presents for evaluation of No chief complaint on file.      HPI  August 20, 2024.    39-year-old female with history of sleep apnea on CPAP but did not use last night.    She is going for a colectomy given diverticulitis.  Denies any chest pain.  Normal functional status.    No dyspnea.  No palpitations syncope or presyncope   Her EKG shows right ventricular conduction delay likely secondary to her sleep apnea.  Otherwise normal.     Past Medical History:   Diagnosis Date    Mild intermittent asthma, uncomplicated     Sleep apnea        Past Surgical History:   Procedure Laterality Date    COLONOSCOPY N/A 7/17/2024    Procedure: COLONOSCOPY;  Surgeon: Chris Rosenthal MD;  Location: [facility] ENDO;  Service: General;  Laterality: N/A;    TONSILLECTOMY Bilateral 4/5/2024    Procedure: TONSILLECTOMY;  Surgeon: Juan M Cevallos MD;  Location: [facility] OR;  Service: ENT;  Laterality: Bilateral;       Social History     Tobacco Use    Smoking status: Never     Passive exposure: Never   Substance Use Topics    Alcohol use: Yes     Comment: 3 cocktails weekly    Drug use: Never       Family History   Problem Relation Name Age of Onset    Heart disease Father      Seizures Sister         Review of Systems   Cardiovascular:  Negative for chest pain, dyspnea on exertion, palpitations and syncope.   Genitourinary: Negative.    Neurological: Negative.        Current Outpatient Medications on File Prior to Visit   Medication Sig    albuterol (PROVENTIL/VENTOLIN HFA) 90 mcg/actuation inhaler INHALE 2 PUFFS BY MOUTH FOUR TIMES A DAY AS NEEDED FOR ASTHMA ATTACK    fexofenadine (ALLEGRA) 180 MG tablet 180 mg.    fluticasone propionate (FLONASE) 50 mcg/actuation nasal spray INSTILL 1 SPRAY IN EACH NOSTRIL EVERY DAY FOR ALLERGIES    metroNIDAZOLE (FLAGYL) 500 MG tablet Take 1 tablet (500 mg total) by mouth 3 (three) times daily. Take initial dose@2pm, second dose@3pm  "and final dose@10pm day before surgery    neomycin (MYCIFRADIN) 500 mg Tab Take 2 tablets (1,000 mg total) by mouth 3 (three) times daily. Take 1st dose@2pm,take 2nd dose@3pm, take last dose@10pm day before surgery    polyethylene glycol (GLYCOLAX) 17 gram/dose powder Take 238 g by mouth once daily. Mix with 2L of gatorade, drink all mixed solution starting@12pm day before surgery, finish by 10pm    HYDROcodone-acetaminophen (NORCO) 5-325 mg per tablet Take 1 tablet by mouth every 6 (six) hours as needed for Pain. (Patient not taking: Reported on 8/20/2024)     No current facility-administered medications on file prior to visit.       Objective:   Objective:  Wt Readings from Last 3 Encounters:   08/20/24 93.4 kg (205 lb 14.6 oz)   08/05/24 90.8 kg (200 lb 1.1 oz)   07/17/24 91.6 kg (202 lb)     Temp Readings from Last 3 Encounters:   08/19/24 98.6 °F (37 °C) (Temporal)   08/05/24 98.5 °F (36.9 °C) (Oral)   07/17/24 97.6 °F (36.4 °C)     BP Readings from Last 3 Encounters:   08/20/24 116/62   08/19/24 107/73   08/05/24 111/72     Pulse Readings from Last 3 Encounters:   08/20/24 90   08/19/24 94   08/05/24 86       Physical Exam  Vitals reviewed.   Constitutional:       Appearance: She is well-developed.   Neck:      Vascular: No carotid bruit.   Cardiovascular:      Rate and Rhythm: Normal rate and regular rhythm.      Pulses: Intact distal pulses.      Heart sounds: Normal heart sounds. No murmur heard.  Pulmonary:      Breath sounds: Normal breath sounds.   Neurological:      Mental Status: She is oriented to person, place, and time.         No results found for: "CHOL"  No results found for: "HDL"  No results found for: "LDLCALC"  No results found for: "TRIG"  No results found for: "CHOLHDL"    Chemistry        Component Value Date/Time     08/05/2024 1632    K 4.1 08/05/2024 1632     08/05/2024 1632    CO2 22 (L) 08/05/2024 1632    BUN 15 08/05/2024 1632    CREATININE 1.0 08/05/2024 1632    GLU 74 " "08/05/2024 1632        Component Value Date/Time    CALCIUM 10.0 08/05/2024 1632    ALKPHOS 59 08/05/2024 1632    AST 23 08/05/2024 1632    ALT 16 08/05/2024 1632    BILITOT 0.3 08/05/2024 1632          No results found for: "TSH"  No results found for: "INR", "PROTIME"  Lab Results   Component Value Date    WBC 6.65 08/05/2024    HGB 11.9 (L) 08/05/2024    HCT 41.0 08/05/2024    MCV 87 08/05/2024     08/05/2024     BNP  @LABRCNTIP(BNP,BNPTRIAGEBLO)@  CrCl cannot be calculated (Patient's most recent lab result is older than the maximum 7 days allowed.).     Imaging:  ======    No results found for this or any previous visit.    No results found for this or any previous visit.    No results found for this or any previous visit.    No results found for this or any previous visit.    No valid procedures specified.    No results found for this or any previous visit.      No results found for this or any previous visit.      No results found for this or any previous visit.      Diagnostic Results:  ECG: Reviewed    The ASCVD Risk score (Shady DK, et al., 2019) failed to calculate for the following reasons:    The 2019 ASCVD risk score is only valid for ages 40 to 79        Assessment and Plan:   Abnormal EKG    Preop examination  -     Ambulatory referral/consult to Cardiology  -     Lipid Panel; Future; Expected date: 08/20/2024    Right ventricular conduction delay    Obstructive sleep apnea on CPAP      Good functional status.  Reviewed EKG.  No dynamic ischemic changes.    Okay to undergo her colectomy from cardiac standpoint.  Reviewed all tests and above medical conditions with patient in detail and formulated treatment plan.  Risk factor modification discussed.   CPAP compliance.  Maintaining healthy weight and weight loss goals were discussed in clinic.    Follow up in  12 months    " Additional Notes: Pulse Dye Laser was recommended to patient 100$ per treatment via [employer]. Render Risk Assessment In Note?: no Detail Level: Simple

## 2024-08-21 LAB
CHOLEST SERPL-MCNC: 104 MG/DL (ref 120–199)
CHOLEST/HDLC SERPL: 3.4 {RATIO} (ref 2–5)
HDLC SERPL-MCNC: 31 MG/DL (ref 40–75)
HDLC SERPL: 29.8 % (ref 20–50)
LDLC SERPL CALC-MCNC: 64.2 MG/DL (ref 63–159)
NONHDLC SERPL-MCNC: 73 MG/DL
TRIGL SERPL-MCNC: 44 MG/DL (ref 30–150)

## 2024-08-26 ENCOUNTER — TELEPHONE (OUTPATIENT)
Dept: PREADMISSION TESTING | Facility: HOSPITAL | Age: 39
End: 2024-08-26
Payer: OTHER GOVERNMENT

## 2024-08-26 ENCOUNTER — ANESTHESIA EVENT (OUTPATIENT)
Dept: SURGERY | Facility: HOSPITAL | Age: 39
DRG: 331 | End: 2024-08-26
Payer: OTHER GOVERNMENT

## 2024-08-26 NOTE — TELEPHONE ENCOUNTER
Called and spoke with patient about the following:     Please arrive to [facility] ([facility]) at 9:00AM on 8/26/2024 for your scheduled procedure.  Address: [address] [place] [address] [place] [address] (2nd Building on left, 1st Floor Lobby)  >>>NO eating or drinking after midnight unless instructed otherwise by your Surgeon<<<    Thank you,  -[facility]  Mon-Fri 7:30 am - 4 pm [phone]

## 2024-08-26 NOTE — TELEPHONE ENCOUNTER
PLEASE NOTE CORRECT SURGERY DATE:    Please arrive to [facility] ([facility]) at 9:10AM on 8/27/2024 for your scheduled procedure.  Address: [address] [place] [address] [place] [address] (2nd Building on left, 1st Floor Lobby)  >>>NO eating or drinking after midnight unless instructed otherwise by your Surgeon<<<    Thank you,  -[facility]  Mon-Fri 7:30 am - 4 pm [phone]

## 2024-08-27 ENCOUNTER — ANESTHESIA (OUTPATIENT)
Dept: SURGERY | Facility: HOSPITAL | Age: 39
DRG: 331 | End: 2024-08-27
Payer: OTHER GOVERNMENT

## 2024-08-27 ENCOUNTER — HOSPITAL ENCOUNTER (INPATIENT)
Facility: HOSPITAL | Age: 39
LOS: 2 days | Discharge: HOME | DRG: 329 | End: 2024-08-29
Attending: COLON & RECTAL SURGERY | Admitting: COLON & RECTAL SURGERY
Payer: OTHER GOVERNMENT

## 2024-08-27 DIAGNOSIS — Z01.818 PREOP EXAMINATION: ICD-10-CM

## 2024-08-27 DIAGNOSIS — Z01.818 PREOP TESTING: ICD-10-CM

## 2024-08-27 DIAGNOSIS — K57.92 DIVERTICULITIS: Primary | ICD-10-CM

## 2024-08-27 LAB
B-HCG UR QL: NEGATIVE
CTP QC/QA: YES

## 2024-08-27 PROCEDURE — 0DBG4ZZ EXCISION OF LEFT LARGE INTESTINE, PERCUTANEOUS ENDOSCOPIC APPROACH: ICD-10-PCS | Performed by: COLON & RECTAL SURGERY

## 2024-08-27 PROCEDURE — 21400001 HC TELEMETRY ROOM

## 2024-08-27 PROCEDURE — 71000039 HC RECOVERY, EACH ADD'L HOUR: Performed by: COLON & RECTAL SURGERY

## 2024-08-27 PROCEDURE — 0DJD8ZZ INSPECTION OF LOWER INTESTINAL TRACT, VIA NATURAL OR ARTIFICIAL OPENING ENDOSCOPIC: ICD-10-PCS | Performed by: COLON & RECTAL SURGERY

## 2024-08-27 PROCEDURE — 27201423 OPTIME MED/SURG SUP & DEVICES STERILE SUPPLY: Performed by: COLON & RECTAL SURGERY

## 2024-08-27 PROCEDURE — 63600175 PHARM REV CODE 636 W HCPCS: Mod: JZ,JG | Performed by: COLON & RECTAL SURGERY

## 2024-08-27 PROCEDURE — C9290 INJ, BUPIVACAINE LIPOSOME: HCPCS | Performed by: COLON & RECTAL SURGERY

## 2024-08-27 PROCEDURE — 25000003 PHARM REV CODE 250: Performed by: COLON & RECTAL SURGERY

## 2024-08-27 PROCEDURE — 88307 TISSUE EXAM BY PATHOLOGIST: CPT | Mod: 26,,, | Performed by: PATHOLOGY

## 2024-08-27 PROCEDURE — 81025 URINE PREGNANCY TEST: CPT | Performed by: COLON & RECTAL SURGERY

## 2024-08-27 PROCEDURE — 63600175 PHARM REV CODE 636 W HCPCS: Performed by: ANESTHESIOLOGY

## 2024-08-27 PROCEDURE — 63600175 PHARM REV CODE 636 W HCPCS: Performed by: STUDENT IN AN ORGANIZED HEALTH CARE EDUCATION/TRAINING PROGRAM

## 2024-08-27 PROCEDURE — 37000008 HC ANESTHESIA 1ST 15 MINUTES: Performed by: COLON & RECTAL SURGERY

## 2024-08-27 PROCEDURE — 88305 TISSUE EXAM BY PATHOLOGIST: CPT | Performed by: PATHOLOGY

## 2024-08-27 PROCEDURE — 44213 LAP MOBIL SPLENIC FL ADD-ON: CPT | Mod: ,,, | Performed by: COLON & RECTAL SURGERY

## 2024-08-27 PROCEDURE — 44213 LAP MOBIL SPLENIC FL ADD-ON: CPT | Mod: AS,,,

## 2024-08-27 PROCEDURE — C1729 CATH, DRAINAGE: HCPCS | Performed by: COLON & RECTAL SURGERY

## 2024-08-27 PROCEDURE — 63600175 PHARM REV CODE 636 W HCPCS: Performed by: COLON & RECTAL SURGERY

## 2024-08-27 PROCEDURE — 44207 L COLECTOMY/COLOPROCTOSTOMY: CPT | Mod: AS,,,

## 2024-08-27 PROCEDURE — 71000033 HC RECOVERY, INTIAL HOUR: Performed by: COLON & RECTAL SURGERY

## 2024-08-27 PROCEDURE — 36000713 HC OR TIME LEV V EA ADD 15 MIN: Performed by: COLON & RECTAL SURGERY

## 2024-08-27 PROCEDURE — 8E0W4CZ ROBOTIC ASSISTED PROCEDURE OF TRUNK REGION, PERCUTANEOUS ENDOSCOPIC APPROACH: ICD-10-PCS | Performed by: COLON & RECTAL SURGERY

## 2024-08-27 PROCEDURE — 88305 TISSUE EXAM BY PATHOLOGIST: CPT | Mod: 26,,, | Performed by: PATHOLOGY

## 2024-08-27 PROCEDURE — 88307 TISSUE EXAM BY PATHOLOGIST: CPT | Performed by: PATHOLOGY

## 2024-08-27 PROCEDURE — 37000009 HC ANESTHESIA EA ADD 15 MINS: Performed by: COLON & RECTAL SURGERY

## 2024-08-27 PROCEDURE — 44207 L COLECTOMY/COLOPROCTOSTOMY: CPT | Mod: ,,, | Performed by: COLON & RECTAL SURGERY

## 2024-08-27 PROCEDURE — 36000712 HC OR TIME LEV V 1ST 15 MIN: Performed by: COLON & RECTAL SURGERY

## 2024-08-27 PROCEDURE — 94799 UNLISTED PULMONARY SVC/PX: CPT

## 2024-08-27 PROCEDURE — 4A1BXSH MONITORING OF GASTROINTESTINAL VASCULAR PERFUSION USING INDOCYANINE GREEN DYE, EXTERNAL APPROACH: ICD-10-PCS | Performed by: COLON & RECTAL SURGERY

## 2024-08-27 PROCEDURE — 25000003 PHARM REV CODE 250: Performed by: STUDENT IN AN ORGANIZED HEALTH CARE EDUCATION/TRAINING PROGRAM

## 2024-08-27 PROCEDURE — 94761 N-INVAS EAR/PLS OXIMETRY MLT: CPT

## 2024-08-27 RX ORDER — HEPARIN SODIUM 5000 [USP'U]/ML
5000 INJECTION, SOLUTION INTRAVENOUS; SUBCUTANEOUS
Status: COMPLETED | OUTPATIENT
Start: 2024-08-27 | End: 2024-08-27

## 2024-08-27 RX ORDER — ONDANSETRON HYDROCHLORIDE 2 MG/ML
INJECTION, SOLUTION INTRAVENOUS
Status: DISCONTINUED | OUTPATIENT
Start: 2024-08-27 | End: 2024-08-27

## 2024-08-27 RX ORDER — SODIUM CHLORIDE, SODIUM LACTATE, POTASSIUM CHLORIDE, CALCIUM CHLORIDE 600; 310; 30; 20 MG/100ML; MG/100ML; MG/100ML; MG/100ML
INJECTION, SOLUTION INTRAVENOUS CONTINUOUS
Status: DISCONTINUED | OUTPATIENT
Start: 2024-08-27 | End: 2024-08-28

## 2024-08-27 RX ORDER — SODIUM CHLORIDE, SODIUM LACTATE, POTASSIUM CHLORIDE, CALCIUM CHLORIDE 600; 310; 30; 20 MG/100ML; MG/100ML; MG/100ML; MG/100ML
INJECTION, SOLUTION INTRAVENOUS CONTINUOUS
Status: DISCONTINUED | OUTPATIENT
Start: 2024-08-27 | End: 2024-08-27

## 2024-08-27 RX ORDER — GABAPENTIN 400 MG/1
800 CAPSULE ORAL
Status: COMPLETED | OUTPATIENT
Start: 2024-08-27 | End: 2024-08-27

## 2024-08-27 RX ORDER — GLUCAGON 1 MG
1 KIT INJECTION
Status: DISCONTINUED | OUTPATIENT
Start: 2024-08-27 | End: 2024-08-27 | Stop reason: HOSPADM

## 2024-08-27 RX ORDER — ROCURONIUM BROMIDE 10 MG/ML
INJECTION, SOLUTION INTRAVENOUS
Status: DISCONTINUED | OUTPATIENT
Start: 2024-08-27 | End: 2024-08-27

## 2024-08-27 RX ORDER — HYDROMORPHONE HYDROCHLORIDE 2 MG/ML
0.2 INJECTION, SOLUTION INTRAMUSCULAR; INTRAVENOUS; SUBCUTANEOUS EVERY 5 MIN PRN
Status: DISCONTINUED | OUTPATIENT
Start: 2024-08-27 | End: 2024-08-27 | Stop reason: HOSPADM

## 2024-08-27 RX ORDER — OXYCODONE HYDROCHLORIDE 5 MG/1
10 TABLET ORAL EVERY 4 HOURS PRN
Status: DISCONTINUED | OUTPATIENT
Start: 2024-08-27 | End: 2024-08-29 | Stop reason: HOSPADM

## 2024-08-27 RX ORDER — ACETAMINOPHEN 500 MG
1000 TABLET ORAL ONCE
Status: COMPLETED | OUTPATIENT
Start: 2024-08-27 | End: 2024-08-27

## 2024-08-27 RX ORDER — LIDOCAINE HYDROCHLORIDE 20 MG/ML
INJECTION INTRAVENOUS
Status: DISCONTINUED | OUTPATIENT
Start: 2024-08-27 | End: 2024-08-27

## 2024-08-27 RX ORDER — ONDANSETRON HYDROCHLORIDE 2 MG/ML
4 INJECTION, SOLUTION INTRAVENOUS DAILY PRN
Status: DISCONTINUED | OUTPATIENT
Start: 2024-08-27 | End: 2024-08-27 | Stop reason: HOSPADM

## 2024-08-27 RX ORDER — INDOCYANINE GREEN AND WATER 25 MG
KIT INJECTION
Status: DISCONTINUED | OUTPATIENT
Start: 2024-08-27 | End: 2024-08-27

## 2024-08-27 RX ORDER — SUCCINYLCHOLINE CHLORIDE 20 MG/ML
INJECTION INTRAMUSCULAR; INTRAVENOUS
Status: DISCONTINUED | OUTPATIENT
Start: 2024-08-27 | End: 2024-08-27

## 2024-08-27 RX ORDER — FAMOTIDINE 20 MG/1
20 TABLET, FILM COATED ORAL 2 TIMES DAILY
Status: DISCONTINUED | OUTPATIENT
Start: 2024-08-27 | End: 2024-08-29 | Stop reason: HOSPADM

## 2024-08-27 RX ORDER — FLUTICASONE PROPIONATE 50 MCG
1 SPRAY, SUSPENSION (ML) NASAL DAILY
Status: DISCONTINUED | OUTPATIENT
Start: 2024-08-28 | End: 2024-08-29 | Stop reason: HOSPADM

## 2024-08-27 RX ORDER — DIPHENHYDRAMINE HYDROCHLORIDE 50 MG/ML
12.5 INJECTION INTRAMUSCULAR; INTRAVENOUS EVERY 6 HOURS PRN
Status: DISCONTINUED | OUTPATIENT
Start: 2024-08-27 | End: 2024-08-29 | Stop reason: HOSPADM

## 2024-08-27 RX ORDER — METRONIDAZOLE 500 MG/100ML
500 INJECTION, SOLUTION INTRAVENOUS
Status: COMPLETED | OUTPATIENT
Start: 2024-08-27 | End: 2024-08-27

## 2024-08-27 RX ORDER — FENTANYL CITRATE 50 UG/ML
INJECTION, SOLUTION INTRAMUSCULAR; INTRAVENOUS
Status: DISCONTINUED | OUTPATIENT
Start: 2024-08-27 | End: 2024-08-27

## 2024-08-27 RX ORDER — ONDANSETRON HYDROCHLORIDE 2 MG/ML
4 INJECTION, SOLUTION INTRAVENOUS EVERY 12 HOURS PRN
Status: DISCONTINUED | OUTPATIENT
Start: 2024-08-27 | End: 2024-08-27 | Stop reason: HOSPADM

## 2024-08-27 RX ORDER — OXYCODONE HYDROCHLORIDE 5 MG/1
5 TABLET ORAL EVERY 4 HOURS PRN
Status: DISCONTINUED | OUTPATIENT
Start: 2024-08-27 | End: 2024-08-29 | Stop reason: HOSPADM

## 2024-08-27 RX ORDER — BUPIVACAINE HYDROCHLORIDE 2.5 MG/ML
INJECTION, SOLUTION EPIDURAL; INFILTRATION; INTRACAUDAL
Status: DISCONTINUED | OUTPATIENT
Start: 2024-08-27 | End: 2024-08-27 | Stop reason: HOSPADM

## 2024-08-27 RX ORDER — ALBUTEROL SULFATE 0.83 MG/ML
2.5 SOLUTION RESPIRATORY (INHALATION) EVERY 4 HOURS PRN
Status: DISCONTINUED | OUTPATIENT
Start: 2024-08-27 | End: 2024-08-29 | Stop reason: HOSPADM

## 2024-08-27 RX ORDER — PROPOFOL 10 MG/ML
VIAL (ML) INTRAVENOUS
Status: DISCONTINUED | OUTPATIENT
Start: 2024-08-27 | End: 2024-08-27

## 2024-08-27 RX ORDER — KETAMINE HCL IN 0.9 % NACL 50 MG/5 ML
SYRINGE (ML) INTRAVENOUS
Status: DISCONTINUED | OUTPATIENT
Start: 2024-08-27 | End: 2024-08-27

## 2024-08-27 RX ORDER — CELECOXIB 100 MG/1
400 CAPSULE ORAL
Status: COMPLETED | OUTPATIENT
Start: 2024-08-27 | End: 2024-08-27

## 2024-08-27 RX ORDER — AMOXICILLIN 250 MG
1 CAPSULE ORAL 2 TIMES DAILY
Status: DISCONTINUED | OUTPATIENT
Start: 2024-08-27 | End: 2024-08-28

## 2024-08-27 RX ORDER — MIDAZOLAM HYDROCHLORIDE 1 MG/ML
INJECTION INTRAMUSCULAR; INTRAVENOUS
Status: DISCONTINUED | OUTPATIENT
Start: 2024-08-27 | End: 2024-08-27

## 2024-08-27 RX ORDER — OXYCODONE AND ACETAMINOPHEN 5; 325 MG/1; MG/1
1 TABLET ORAL
Status: DISCONTINUED | OUTPATIENT
Start: 2024-08-27 | End: 2024-08-27 | Stop reason: HOSPADM

## 2024-08-27 RX ORDER — ACETAMINOPHEN 10 MG/ML
1000 INJECTION, SOLUTION INTRAVENOUS EVERY 8 HOURS
Status: COMPLETED | OUTPATIENT
Start: 2024-08-27 | End: 2024-08-28

## 2024-08-27 RX ORDER — CHLORHEXIDINE GLUCONATE ORAL RINSE 1.2 MG/ML
10 SOLUTION DENTAL 2 TIMES DAILY
Status: DISCONTINUED | OUTPATIENT
Start: 2024-08-27 | End: 2024-08-29 | Stop reason: HOSPADM

## 2024-08-27 RX ORDER — KETOROLAC TROMETHAMINE 30 MG/ML
15 INJECTION, SOLUTION INTRAMUSCULAR; INTRAVENOUS EVERY 8 HOURS PRN
Status: DISCONTINUED | OUTPATIENT
Start: 2024-08-27 | End: 2024-08-27 | Stop reason: HOSPADM

## 2024-08-27 RX ORDER — ALBUTEROL SULFATE 90 UG/1
1 INHALANT RESPIRATORY (INHALATION) EVERY 4 HOURS PRN
Status: DISCONTINUED | OUTPATIENT
Start: 2024-08-27 | End: 2024-08-27

## 2024-08-27 RX ORDER — DEXAMETHASONE SODIUM PHOSPHATE 4 MG/ML
INJECTION, SOLUTION INTRA-ARTICULAR; INTRALESIONAL; INTRAMUSCULAR; INTRAVENOUS; SOFT TISSUE
Status: DISCONTINUED | OUTPATIENT
Start: 2024-08-27 | End: 2024-08-27

## 2024-08-27 RX ORDER — ONDANSETRON HYDROCHLORIDE 2 MG/ML
4 INJECTION, SOLUTION INTRAVENOUS EVERY 6 HOURS PRN
Status: DISCONTINUED | OUTPATIENT
Start: 2024-08-27 | End: 2024-08-29 | Stop reason: HOSPADM

## 2024-08-27 RX ORDER — MORPHINE SULFATE 2 MG/ML
2 INJECTION, SOLUTION INTRAMUSCULAR; INTRAVENOUS
Status: DISCONTINUED | OUTPATIENT
Start: 2024-08-27 | End: 2024-08-29 | Stop reason: HOSPADM

## 2024-08-27 RX ORDER — ENOXAPARIN SODIUM 100 MG/ML
40 INJECTION SUBCUTANEOUS EVERY 24 HOURS
Status: DISCONTINUED | OUTPATIENT
Start: 2024-08-28 | End: 2024-08-29 | Stop reason: HOSPADM

## 2024-08-27 RX ADMIN — LIDOCAINE HYDROCHLORIDE 100 MG: 20 INJECTION INTRAVENOUS at 12:08

## 2024-08-27 RX ADMIN — ONDANSETRON 4 MG: 2 INJECTION INTRAMUSCULAR; INTRAVENOUS at 03:08

## 2024-08-27 RX ADMIN — FAMOTIDINE 20 MG: 20 TABLET ORAL at 09:08

## 2024-08-27 RX ADMIN — PROPOFOL 150 MG: 10 INJECTION, EMULSION INTRAVENOUS at 12:08

## 2024-08-27 RX ADMIN — FENTANYL CITRATE 50 MCG: 50 INJECTION, SOLUTION INTRAMUSCULAR; INTRAVENOUS at 01:08

## 2024-08-27 RX ADMIN — MIDAZOLAM HYDROCHLORIDE 2 MG: 1 INJECTION, SOLUTION INTRAMUSCULAR; INTRAVENOUS at 12:08

## 2024-08-27 RX ADMIN — GABAPENTIN 800 MG: 400 CAPSULE ORAL at 10:08

## 2024-08-27 RX ADMIN — ACETAMINOPHEN 1000 MG: 500 TABLET ORAL at 10:08

## 2024-08-27 RX ADMIN — ROCURONIUM BROMIDE 10 MG: 10 SOLUTION INTRAVENOUS at 03:08

## 2024-08-27 RX ADMIN — ROCURONIUM BROMIDE 25 MG: 10 SOLUTION INTRAVENOUS at 12:08

## 2024-08-27 RX ADMIN — INDOCYANINE GREEN AND WATER 5 MG: KIT at 03:08

## 2024-08-27 RX ADMIN — HEPARIN SODIUM 5000 UNITS: 5000 INJECTION INTRAVENOUS; SUBCUTANEOUS at 10:08

## 2024-08-27 RX ADMIN — Medication 25 MG: at 12:08

## 2024-08-27 RX ADMIN — CEFTRIAXONE SODIUM 2 G: 2 INJECTION, POWDER, FOR SOLUTION INTRAMUSCULAR; INTRAVENOUS at 12:08

## 2024-08-27 RX ADMIN — SUGAMMADEX 200 MG: 100 INJECTION, SOLUTION INTRAVENOUS at 03:08

## 2024-08-27 RX ADMIN — SODIUM CHLORIDE, SODIUM LACTATE, POTASSIUM CHLORIDE, AND CALCIUM CHLORIDE: .6; .31; .03; .02 INJECTION, SOLUTION INTRAVENOUS at 12:08

## 2024-08-27 RX ADMIN — ROCURONIUM BROMIDE 20 MG: 10 SOLUTION INTRAVENOUS at 01:08

## 2024-08-27 RX ADMIN — ROCURONIUM BROMIDE 20 MG: 10 SOLUTION INTRAVENOUS at 12:08

## 2024-08-27 RX ADMIN — ROCURONIUM BROMIDE 5 MG: 10 SOLUTION INTRAVENOUS at 12:08

## 2024-08-27 RX ADMIN — SENNOSIDES AND DOCUSATE SODIUM 1 TABLET: 50; 8.6 TABLET ORAL at 09:08

## 2024-08-27 RX ADMIN — SUCCINYLCHOLINE CHLORIDE 120 MG: 20 INJECTION, SOLUTION INTRAMUSCULAR; INTRAVENOUS; PARENTERAL at 12:08

## 2024-08-27 RX ADMIN — ROCURONIUM BROMIDE 10 MG: 10 SOLUTION INTRAVENOUS at 02:08

## 2024-08-27 RX ADMIN — HYDROMORPHONE HYDROCHLORIDE 0.2 MG: 2 INJECTION INTRAMUSCULAR; INTRAVENOUS; SUBCUTANEOUS at 04:08

## 2024-08-27 RX ADMIN — METRONIDAZOLE 500 MG: 5 INJECTION, SOLUTION INTRAVENOUS at 12:08

## 2024-08-27 RX ADMIN — SODIUM CHLORIDE, POTASSIUM CHLORIDE, SODIUM LACTATE AND CALCIUM CHLORIDE: 600; 310; 30; 20 INJECTION, SOLUTION INTRAVENOUS at 06:08

## 2024-08-27 RX ADMIN — FENTANYL CITRATE 50 MCG: 50 INJECTION, SOLUTION INTRAMUSCULAR; INTRAVENOUS at 12:08

## 2024-08-27 RX ADMIN — CHLORHEXIDINE GLUCONATE 0.12% ORAL RINSE 10 ML: 1.2 LIQUID ORAL at 09:08

## 2024-08-27 RX ADMIN — CELECOXIB 400 MG: 100 CAPSULE ORAL at 10:08

## 2024-08-27 RX ADMIN — ROCURONIUM BROMIDE 20 MG: 10 SOLUTION INTRAVENOUS at 02:08

## 2024-08-27 RX ADMIN — DEXAMETHASONE SODIUM PHOSPHATE 8 MG: 4 INJECTION, SOLUTION INTRA-ARTICULAR; INTRALESIONAL; INTRAMUSCULAR; INTRAVENOUS; SOFT TISSUE at 12:08

## 2024-08-27 RX ADMIN — ACETAMINOPHEN 1000 MG: 10 INJECTION INTRAVENOUS at 10:08

## 2024-08-27 NOTE — ANESTHESIA PREPROCEDURE EVALUATION
08/27/2024  Suri Hameed is a 39 y.o., female.39 y.o. female with descending/sigmoid diverticulitis with mesenteric phlegmon     Patient Active Problem List   Diagnosis    Chronic tonsillitis    Tonsillar hypertrophy    Diverticulitis    Uncomplicated asthma    Sleep apnea     Past Surgical History:   Procedure Laterality Date    COLONOSCOPY N/A 7/17/2024    Procedure: COLONOSCOPY;  Surgeon: Chris Rosenthal MD;  Location: [facility] ENDO;  Service: General;  Laterality: N/A;    TONSILLECTOMY Bilateral 4/5/2024    Procedure: TONSILLECTOMY;  Surgeon: Juan M Cevallos MD;  Location: [facility] OR;  Service: ENT;  Laterality: Bilateral;       Pre-op Assessment    I have reviewed the Patient Summary Reports.    I have reviewed the NPO Status.   I have reviewed the Medications.     Review of Systems  Anesthesia Hx:  No problems with previous Anesthesia                Social:  Non-Smoker       Hematology/Oncology:  Hematology Normal                                     Cardiovascular:  Cardiovascular Normal                  ECG has been reviewed. Okay to undergo her colectomy from cardiac standpoint.                         Pulmonary:    Asthma asymptomatic   Sleep Apnea                Renal/:  Renal/ Normal                 Hepatic/GI:  Hepatic/GI Normal       diverticulitis          Neurological:  Neurology Normal                                      Endocrine:  Endocrine Normal                Physical Exam  General: Well nourished    Airway:  Mallampati: II   Mouth Opening: Normal  TM Distance: Normal  Neck ROM: Normal ROM    Dental:  Intact        Anesthesia Plan  Type of Anesthesia, risks & benefits discussed:    Anesthesia Type: Gen ETT  Intra-op Monitoring Plan: Standard ASA Monitors  Post Op Pain Control Plan: multimodal analgesia  Induction:  IV  Airway Plan: , Post-Induction  Informed Consent: Informed  consent signed with the Patient and all parties understand the risks and agree with anesthesia plan.  All questions answered.   ASA Score: 2    Ready For Surgery From Anesthesia Perspective.     .      Chemistry        Component Value Date/Time     08/05/2024 1632    K 4.1 08/05/2024 1632     08/05/2024 1632    CO2 22 (L) 08/05/2024 1632    BUN 15 08/05/2024 1632    CREATININE 1.0 08/05/2024 1632    GLU 74 08/05/2024 1632        Component Value Date/Time    CALCIUM 10.0 08/05/2024 1632    ALKPHOS 59 08/05/2024 1632    AST 23 08/05/2024 1632    ALT 16 08/05/2024 1632    BILITOT 0.3 08/05/2024 1632        Lab Results   Component Value Date    WBC 6.65 08/05/2024    HGB 11.9 (L) 08/05/2024    HCT 41.0 08/05/2024    MCV 87 08/05/2024     08/05/2024         Normal sinus rhythm with sinus arrhythmia   rvcd   Nonspecific T wave abnormality   Abnormal ECG   No previous ECGs available   Confirmed by Conner Bell MD ([id number]) on 8/20/2024 12:08:03 AM

## 2024-08-27 NOTE — OP NOTE
[facility] Surgery ([facility])  Surgery Department  Operative Note    SUMMARY     Date of Procedure: 8/27/2024     Procedure:  Robotic partial left colectomy  Mobilization of splenic flexure  Omental pedicle flap  Transversus abdominis plane block  Flexible sigmoidoscopy    Surgeons and Role:     * Chris Rosenthal MD - Primary    Assistant: MARIXA Salinas    Pre-Operative Diagnosis: Diverticulitis [K57.92]    Post-Operative Diagnosis: Post-Op Diagnosis Codes:     * Diverticulitis [K57.92]    Anesthesia: General    Technical Procedures Used:   Robotic partial left colectomy  Mobilization of splenic flexure  Omental pedicle flap  Transversus abdominis plane block  Flexible sigmoidoscopy    Indications for Procedure:  39-year-old female with previous diverticulitis who presents for definitive surgical management    Findings of the Procedure:  Phlegmon thickened descending colon of previous area of diverticulitis amenable to partial left colectomy with colorectal anastomosis    Description of the Procedure:  Patient was brought to the operating room and placed supine on table.  General endotracheal anesthesia was then induced.  A Lozano catheter was sterilely placed.  The patient was moved to lithotomy position.  A rectal washout was then performed.  The abdomen was then prepped and draped usual sterile fashion.  A preop surgical time-out was performed confirming the correct patient, procedure and preop medications given.  A left upper quadrant 8 mm incision was made beneath the subcostal margin and the Veress needle inserted into abdominal cavity and confirmed good position with aspiration and saline drop test.  The abdomen is insufflated to pressure 15 mm of mercury.  An 8 mm robotic trocar was inserted through this defect using Optiview technique.  The camera was introduced and there was no signs of injury upon entry.  A transversus abdominis plane block was then performed using a mixture of 20 cc of Exparel, 30 cc  of 0.25% bupivacaine plain and 10 cc of injectable saline.  12.5 cc injected bilaterally into the transversus abdominis plane in all 4 quadrants for a total of 50 cc given for the block.  The remaining 10 cc was used at the end the case for local infiltration.  A right lower quadrant 12 mm port was then placed under direct visualization.  Two additional 8 mm ports were then placed in a linear fashion for left upper quadrant to the right lower quadrant.  A right upper quadrant 5 mm AirSeal port was placed for assistance.    The robot was then docked in usual fashion.  There was a thickened firm area of the descending colon that was easily visible and it was adhered to the abdominal sidewall.  Attention was then turned to the sigmoid colon.  The sigmoid colon was mobilized off its lateral attachments.  The HARRIET pedicle was then identified.  The left ureter then was identified and protected.  A mesenteric window was then made beneath the HARRIET pedicle medially.  The HARRIET pedicle was then circumferentially dissected and the left ureter was identified and protected throughout this process.  The HARRIET pedicle was then taken via high ligation using the vessel sealer.  Once that was taken was completely hemostatic.  The dissection was then carried superiorly in a medial to lateral fashion sweeping down the retroperitoneal structures including the ureter.  Once this was done all the way to the level of the descending colon where the phlegmon of tissue was identified this was dissected off of the abdominal sidewall using robotic aditi.  Care was taken to protect surrounding structures.  Once this was fully mobilized, attention was then turned along the descending colon and mobilized up the lateral aspect along the white line of Toldt all the way to the level of the splenic flexure.    Attention was then turned to the splenic flexure.  The omentum was dissected off of the transverse colon and the lesser sac was entered in usual  fashion.  The splenic flexure was mobilized by taken down the gastrocolic, splenocolic and phrenocolic ligaments using the vessel sealer.  Once this was done the remaining flimsy attachments were taken down using the sharp and blunt dissection to fully mobilize the splenic flexure.  Attention was then turned medially to the IMV.  A mesenteric window was made lateral to the ligament of Treitz and the IMV was divided via high ligation using the vessel sealer with care taken to protect the surrounding structures.  Once this was done the left colon was fully mobilized.  Care was taken to protect the pancreas, kidney, spleen and small bowel during this process and none appear injured.    Attention was then turned back to the sigmoid colon.  The upper rectum was identified and chosen as the point of transection.  A mesenteric window was made beneath this location.  The mesentery was then divided using the vessel sealer at this location and the dissection was connected to the previous HARRIET pedicle dissection with care taken to protect the surrounding structures including the ureters.  Once this was completed, the rectum was divided using the vessel sealer at this location.  A point on the mid descending colon was then identified as a point of transection above the level of the active disease and where there was normal-appearing caliber colon.  The descending colon was then divided at this location after making a mesenteric window and taking the mesentery using the vessel sealer.  The colon was divided using the vessel sealer.  Ic green was then given by anesthesia and firefly technology was used to confirm perfusion to the cut ends of the descending colon and the rectum.    The large Endo-Catch bag was then inserted into the anus and opened inside the abdomen through the rectal opening and the specimen was placed within this bag and extracted via natural orifice extraction through the rectum.  It was passed off the field  for final pathology.  The 29 mm EEA stapler was then brought onto the field.  The anvil and spike were then brought through the anus and rectum into the abdominal cavity.  The anvil was then brought 5 cm proximal to the opening of the descending colon for future end-to-side anastomosis on the anti mesenteric border of the descending colon.  The spike was then removed from the abdominal cavity through the rectum.  The descending colon was then stapled closed using a robotic 60 mm blue load stapler.  Once this was completed, the excised portion of the staple line was then brought through the rectum and passed off the field for final pathology for the proximal margin.  The rectum was then stapled closed using the robotic 60 mm blue load stapler and the excised portion of the staple line was then removed through the 12 mm port.    The 29 mm EEA stapler was then brought through the anus after 2 fingerbreadth dilation and after rectal sizers were used to confirm easy passage to the rectal staple line.  The spike was opened just anterior to the staple line.  The anvil and spike were then  together.  The stapler was then closed and it was confirmed there was no twisting of the mesentery and reached easily without tension.  The stapler was then fired after confirming no additional structures were inadvertently incorporated within the stapler.  Once it was withdrawn, there were 2 intact anastomotic donuts which were sent for final pathology.  The proximal descending colon was then manually clamped and the pelvis was filled with irrigation.  A flexible sigmoidoscope was then inserted into the anus and the colon rectum were insufflated.  There was a negative air leak test.  The anastomosis appeared to be widely patent and hemostatic upon endoscopic viewing.  The colon rectum were then desufflated and the flexible sigmoidoscope was withdrawn.    The abdomen was then checked and found to be hemostatic in all 4 quadrants.   The omentum which had been previously dissected off was brought into the pelvis and placed over top of the anastomosis for the omental pedicle flap and sutured into place using a 3-0 Vicryl suture.  Once this was completed the rest the abdomen was checked and found to be hemostatic.  The robot was then de docked.  The right lower quadrant 12 mm port was then removed and the fascia was closed at the site using a Byron-Freeman device with 0 Vicryl suture in a figure-of-eight fashion.  Once this was fully closed, the rest of the abdomen checked and found to be hemostatic.  The abdomen was then desufflated and the remaining ports were removed.  All sites were then injected with local infiltration and copiously irrigated and made hemostatic.  All sites were then closed with 4-0 Monocryl suture.  Skin glue was then applied.  The patient was then moved back into the supine position.  She was woken from general endotracheal anesthesia and taken to the postanesthesia care in stable condition.  She tolerated procedure well.  All sponge, needle and instrument counts were correct at the end of the case.    Significant Surgical Tasks Conducted by the Assistant(s), if Applicable: Assisted with all portions of the operation as it was required to help with the positioning, exposure and closure to complete the operation    Complications: No    Estimated Blood Loss (EBL): 25mL           Implants: * No implants in log *    Specimens:   Specimen (24h ago, onward)       Start     Ordered    08/27/24 1521  Specimen to Pathology, Surgery General Surgery  Once        Comments: Pre-op Diagnosis: Diverticulitis [K57.92]Procedure(s):XI ROBOTIC COLECTOMY, SIGMOIDSIGMOIDOSCOPY, FLEXIBLEBLOCK, TRANSVERSUS ABDOMINIS PLANE Number of specimens: 4Name of specimens: 1. Partial left colon-perm2. Proximal margins-perm3. Distal margins-perm4. Anastomotic doughnuts-perm     References:    Click here for ordering Quick Tip   Question Answer Comment    Procedure Type: General Surgery    Specimen Class: Routine/Screening    Release to patient Immediate        08/27/24 3997                            Condition: Good    Disposition: PACU - hemodynamically stable.    Attestation: I performed the procedure.

## 2024-08-27 NOTE — TRANSFER OF CARE
"Anesthesia Transfer of Care Note    Patient: Suir Hameed    Procedure(s) Performed: Procedure(s) (LRB):  XI ROBOTIC COLECTOMY, SIGMOID (Left)  SIGMOIDOSCOPY, FLEXIBLE  BLOCK, TRANSVERSUS ABDOMINIS PLANE (N/A)  WRAP-OMENTAL (N/A)    Patient location: PACU    Anesthesia Type: general    Transport from OR: Transported from OR on room air with adequate spontaneous ventilation    Post pain: adequate analgesia    Post assessment: no apparent anesthetic complications and tolerated procedure well    Post vital signs: stable    Level of consciousness: responds to stimulation and sedated    Nausea/Vomiting: no nausea/vomiting    Complications: none    Transfer of care protocol was followedComments: Report given to PACU RN at bedside. Hand off tool used. RN given opportunity to ask questions or clarify concerns. No Concerns verbalized. RN was asked if ready to assume care of patient. RN verbally confirmed. Pt. left in stable condition. SV. Vital Signs Return to Near Baseline. No s/s of distress noted.       Last vitals: Visit Vitals  BP (!) 160/76 (BP Location: Right arm, Patient Position: Lying)   Pulse 83   Temp 36.9 °C (98.4 °F) (Temporal)   Resp 11   Ht 5' 9" (1.753 m)   Wt 92.7 kg (204 lb 4.1 oz)   LMP 08/27/2024   SpO2 100%   Breastfeeding No   BMI 30.16 kg/m²     "

## 2024-08-28 LAB
ANION GAP SERPL CALC-SCNC: 9 MMOL/L (ref 8–16)
BASOPHILS # BLD AUTO: 0.01 K/UL (ref 0–0.2)
BASOPHILS NFR BLD: 0.1 % (ref 0–1.9)
BUN SERPL-MCNC: 10 MG/DL (ref 6–20)
CALCIUM SERPL-MCNC: 9.1 MG/DL (ref 8.7–10.5)
CHLORIDE SERPL-SCNC: 107 MMOL/L (ref 95–110)
CO2 SERPL-SCNC: 23 MMOL/L (ref 23–29)
CREAT SERPL-MCNC: 0.9 MG/DL (ref 0.5–1.4)
DIFFERENTIAL METHOD BLD: ABNORMAL
EOSINOPHIL # BLD AUTO: 0 K/UL (ref 0–0.5)
EOSINOPHIL NFR BLD: 0 % (ref 0–8)
ERYTHROCYTE [DISTWIDTH] IN BLOOD BY AUTOMATED COUNT: 14.3 % (ref 11.5–14.5)
EST. GFR  (NO RACE VARIABLE): >60 ML/MIN/1.73 M^2
GLUCOSE SERPL-MCNC: 125 MG/DL (ref 70–110)
HCT VFR BLD AUTO: 30.5 % (ref 37–48.5)
HGB BLD-MCNC: 9.7 G/DL (ref 12–16)
IMM GRANULOCYTES # BLD AUTO: 0.06 K/UL (ref 0–0.04)
IMM GRANULOCYTES NFR BLD AUTO: 0.5 % (ref 0–0.5)
LYMPHOCYTES # BLD AUTO: 1.1 K/UL (ref 1–4.8)
LYMPHOCYTES NFR BLD: 9 % (ref 18–48)
MAGNESIUM SERPL-MCNC: 1.8 MG/DL (ref 1.6–2.6)
MCH RBC QN AUTO: 25.7 PG (ref 27–31)
MCHC RBC AUTO-ENTMCNC: 31.8 G/DL (ref 32–36)
MCV RBC AUTO: 81 FL (ref 82–98)
MONOCYTES # BLD AUTO: 0.7 K/UL (ref 0.3–1)
MONOCYTES NFR BLD: 5.9 % (ref 4–15)
NEUTROPHILS # BLD AUTO: 10 K/UL (ref 1.8–7.7)
NEUTROPHILS NFR BLD: 84.5 % (ref 38–73)
NRBC BLD-RTO: 0 /100 WBC
PHOSPHATE SERPL-MCNC: 2.1 MG/DL (ref 2.7–4.5)
PLATELET # BLD AUTO: 353 K/UL (ref 150–450)
PMV BLD AUTO: 9.7 FL (ref 9.2–12.9)
POTASSIUM SERPL-SCNC: 4.1 MMOL/L (ref 3.5–5.1)
RBC # BLD AUTO: 3.78 M/UL (ref 4–5.4)
SODIUM SERPL-SCNC: 139 MMOL/L (ref 136–145)
WBC # BLD AUTO: 11.78 K/UL (ref 3.9–12.7)

## 2024-08-28 PROCEDURE — 85025 COMPLETE CBC W/AUTO DIFF WBC: CPT | Performed by: COLON & RECTAL SURGERY

## 2024-08-28 PROCEDURE — 99900035 HC TECH TIME PER 15 MIN (STAT)

## 2024-08-28 PROCEDURE — 63600175 PHARM REV CODE 636 W HCPCS: Performed by: COLON & RECTAL SURGERY

## 2024-08-28 PROCEDURE — 80048 BASIC METABOLIC PNL TOTAL CA: CPT | Performed by: COLON & RECTAL SURGERY

## 2024-08-28 PROCEDURE — 99024 POSTOP FOLLOW-UP VISIT: CPT | Mod: ,,, | Performed by: COLON & RECTAL SURGERY

## 2024-08-28 PROCEDURE — 83735 ASSAY OF MAGNESIUM: CPT | Performed by: COLON & RECTAL SURGERY

## 2024-08-28 PROCEDURE — 94799 UNLISTED PULMONARY SVC/PX: CPT | Mod: XB

## 2024-08-28 PROCEDURE — 25000003 PHARM REV CODE 250: Performed by: COLON & RECTAL SURGERY

## 2024-08-28 PROCEDURE — 36415 COLL VENOUS BLD VENIPUNCTURE: CPT | Performed by: COLON & RECTAL SURGERY

## 2024-08-28 PROCEDURE — 84100 ASSAY OF PHOSPHORUS: CPT | Performed by: COLON & RECTAL SURGERY

## 2024-08-28 PROCEDURE — 21400001 HC TELEMETRY ROOM

## 2024-08-28 RX ADMIN — CHLORHEXIDINE GLUCONATE 0.12% ORAL RINSE 10 ML: 1.2 LIQUID ORAL at 08:08

## 2024-08-28 RX ADMIN — FAMOTIDINE 20 MG: 20 TABLET ORAL at 08:08

## 2024-08-28 RX ADMIN — SODIUM CHLORIDE, POTASSIUM CHLORIDE, SODIUM LACTATE AND CALCIUM CHLORIDE: 600; 310; 30; 20 INJECTION, SOLUTION INTRAVENOUS at 06:08

## 2024-08-28 RX ADMIN — OXYCODONE HYDROCHLORIDE 10 MG: 5 TABLET ORAL at 09:08

## 2024-08-28 RX ADMIN — ENOXAPARIN SODIUM 40 MG: 40 INJECTION SUBCUTANEOUS at 06:08

## 2024-08-28 RX ADMIN — OXYCODONE HYDROCHLORIDE 10 MG: 5 TABLET ORAL at 08:08

## 2024-08-28 RX ADMIN — ACETAMINOPHEN 1000 MG: 10 INJECTION INTRAVENOUS at 02:08

## 2024-08-28 RX ADMIN — ACETAMINOPHEN 1000 MG: 10 INJECTION INTRAVENOUS at 06:08

## 2024-08-28 NOTE — ANESTHESIA POSTPROCEDURE EVALUATION
Anesthesia Post Evaluation    [facility]: [facility]    Procedure(s) Performed: Procedure(s) (LRB):  XI ROBOTIC COLECTOMY, SIGMOID (Left)  SIGMOIDOSCOPY, FLEXIBLE  BLOCK, TRANSVERSUS ABDOMINIS PLANE (N/A)  WRAP-OMENTAL (N/A)    Final Anesthesia Type: general      Patient location during evaluation: PACU  Patient participation: Yes- Able to Participate  Level of consciousness: awake and alert  Post-procedure vital signs: reviewed and stable  Pain management: adequate  Airway patency: patent  RONNA mitigation strategies: Verification of full reversal of neuromuscular block  PONV status at discharge: No PONV  Anesthetic complications: no      Cardiovascular status: hemodynamically stable  Respiratory status: spontaneous ventilation  Hydration status: euvolemic  Follow-up not needed.              Vitals Value Taken Time   /68 08/28/24 0722   Temp 36.7 °C (98.1 °F) 08/28/24 0722   Pulse 82 08/28/24 0722   Resp 18 08/28/24 0722   SpO2 98 % 08/28/24 0722         Event Time   Out of Recovery 17:40:00         Pain/Mykel Score: Pain Rating Prior to Med Admin: 3 (8/28/2024  6:15 AM)  Mykel Score: 9 (8/27/2024  5:30 PM)

## 2024-08-28 NOTE — PLAN OF CARE
A224/A224 SYLVIE Hameed is a 39 y.o.female admitted on 8/27/2024 for <principal problem not specified>   Code Status: Full Code MRN: [id number]   Review of patient's allergies indicates:  No Known Allergies  Past Medical History:   Diagnosis Date    Mild intermittent asthma, uncomplicated     Sleep apnea       PRN meds    albuterol sulfate, 2.5 mg, Q4H PRN  dextrose 10%, 12.5 g, PRN  dextrose 10%, 25 g, PRN  diphenhydrAMINE, 12.5 mg, Q6H PRN  morphine, 2 mg, Q3H PRN  ondansetron, 4 mg, Q6H PRN  oxyCODONE, 10 mg, Q4H PRN  oxyCODONE, 5 mg, Q4H PRN      Chart check completed. Will continue plan of care.         [place] Coma Scale Score: 15     Lead Monitored: Lead II Rhythm: normal sinus rhythm    Cardiac/Telemetry Box Number: PACU 05  VTE Required Core Measure: (SCDs) Sequential compression device initiated/maintained Last Bowel Movement: 08/27/24  Diet Adult Regular  Voiding Characteristics: urethral catheter (bladder)  Cornelio Score: 23  Fall Risk Score: 3  Accucheck []   Freq?      Lines/Drains/Airways       Drain  Duration                  Urethral Catheter 08/27/24 1340 Straight-tip 16 Fr. <1 day              Peripheral Intravenous Line  Duration                  Peripheral IV - Single Lumen 08/27/24 1034 20 G Left;Posterior Hand <1 day         Peripheral IV - Single Lumen 08/27/24 1700 20 G Posterior;Right Hand <1 day

## 2024-08-28 NOTE — PLAN OF CARE
Critical access hospital - Telemetry (Hospital)  Initial Discharge Assessment       Primary Care Provider: [place] [place]    Admission Diagnosis: Diverticulitis [K57.92]  Preop testing [Z01.818]    Admission Date: 8/27/2024  Expected Discharge Date: Per Attending    Transition of Care Barriers: None    Payor: [employer] / Plan: [facility] [employer] / Product Type: Government /     Extended Emergency Contact Information  Primary Emergency Contact: JANET MANCIA  Mobile Phone: [phone]  Relation: Relative  Preferred language: English   needed? No  Secondary Emergency Contact: Kale,Guillermina  Mobile Phone: [phone]  Relation: Friend    Discharge Plan A: Home with family         [id number] - [place] - [address] AT [facility]  [address]  [place] [address]  Phone: [phone] Fax: [phone]      Initial Assessment (most recent)       Adult Discharge Assessment - 08/28/24 0926          Discharge Assessment    Assessment Type Discharge Planning Assessment     Confirmed/corrected address, phone number and insurance Yes     Confirmed Demographics Correct on Facesheet     Source of Information patient     Communicated ANATOLIY with patient/caregiver Date not available/Unable to determine     Reason For Admission Diverticulitis     People in Home alone     Facility Arrived From: home     Do you expect to return to your current living situation? Yes     Do you have help at home or someone to help you manage your care at home? Yes     Who are your caregiver(s) and their phone number(s)? friends/ family     Prior to hospitilization cognitive status: Alert/Oriented     Current cognitive status: Alert/Oriented     Walking or Climbing Stairs Difficulty no     Dressing/Bathing Difficulty no     Equipment Currently Used at Home CPAP     Readmission within 30 days? No     Patient currently being followed by outpatient case management? No     Do you currently have  service(s) that help you manage your care at home? No     Do you take prescription medications? Yes     Do you have prescription coverage? Yes     Coverage [employer]     Do you have any problems affording any of your prescribed medications? No     Is the patient taking medications as prescribed? yes     Who is going to help you get home at discharge? Diverticulitis     How do you get to doctors appointments? car, drives self     Are you on dialysis? No     Do you take coumadin? No     Discharge Plan A Home with family     DME Needed Upon Discharge  none     Discharge Plan discussed with: Patient     Transition of Care Barriers None        Transportation Needs    Has the lack of transportation kept you from medical appointments, meetings, work or from getting things needed for daily living? No                   Anticipated DC dispo: home with family  Prior Level of Function: independent with ADLs  People in home: alone    Comments:  SW met with patient at bedside to introduce role and discuss discharge planning. Patient's friends/ family will be help at home and can provide transport at time of discharge. Confirmed demographics, insurance, and emergency contacts. SW updated Patient's whiteboard with contact information and anticipated DC plan. CM discharge needs depends on hospital progress. SW will continue following to assist with other needs.

## 2024-08-29 VITALS
WEIGHT: 211.63 LBS | HEIGHT: 69 IN | DIASTOLIC BLOOD PRESSURE: 75 MMHG | TEMPERATURE: 98 F | BODY MASS INDEX: 31.34 KG/M2 | RESPIRATION RATE: 16 BRPM | SYSTOLIC BLOOD PRESSURE: 125 MMHG | HEART RATE: 76 BPM | OXYGEN SATURATION: 98 %

## 2024-08-29 LAB
ANION GAP SERPL CALC-SCNC: 9 MMOL/L (ref 8–16)
BASOPHILS # BLD AUTO: 0.03 K/UL (ref 0–0.2)
BASOPHILS NFR BLD: 0.3 % (ref 0–1.9)
BUN SERPL-MCNC: 9 MG/DL (ref 6–20)
CALCIUM SERPL-MCNC: 8.4 MG/DL (ref 8.7–10.5)
CHLORIDE SERPL-SCNC: 107 MMOL/L (ref 95–110)
CO2 SERPL-SCNC: 24 MMOL/L (ref 23–29)
CREAT SERPL-MCNC: 0.8 MG/DL (ref 0.5–1.4)
DIFFERENTIAL METHOD BLD: ABNORMAL
EOSINOPHIL # BLD AUTO: 0.1 K/UL (ref 0–0.5)
EOSINOPHIL NFR BLD: 0.8 % (ref 0–8)
ERYTHROCYTE [DISTWIDTH] IN BLOOD BY AUTOMATED COUNT: 14.6 % (ref 11.5–14.5)
EST. GFR  (NO RACE VARIABLE): >60 ML/MIN/1.73 M^2
GLUCOSE SERPL-MCNC: 86 MG/DL (ref 70–110)
HCT VFR BLD AUTO: 26.7 % (ref 37–48.5)
HGB BLD-MCNC: 8.3 G/DL (ref 12–16)
IMM GRANULOCYTES # BLD AUTO: 0.02 K/UL (ref 0–0.04)
IMM GRANULOCYTES NFR BLD AUTO: 0.2 % (ref 0–0.5)
LYMPHOCYTES # BLD AUTO: 3.2 K/UL (ref 1–4.8)
LYMPHOCYTES NFR BLD: 36.4 % (ref 18–48)
MAGNESIUM SERPL-MCNC: 1.8 MG/DL (ref 1.6–2.6)
MCH RBC QN AUTO: 25.4 PG (ref 27–31)
MCHC RBC AUTO-ENTMCNC: 31.1 G/DL (ref 32–36)
MCV RBC AUTO: 82 FL (ref 82–98)
MONOCYTES # BLD AUTO: 0.5 K/UL (ref 0.3–1)
MONOCYTES NFR BLD: 6.1 % (ref 4–15)
NEUTROPHILS # BLD AUTO: 5 K/UL (ref 1.8–7.7)
NEUTROPHILS NFR BLD: 56.2 % (ref 38–73)
NRBC BLD-RTO: 0 /100 WBC
PHOSPHATE SERPL-MCNC: 2.9 MG/DL (ref 2.7–4.5)
PLATELET # BLD AUTO: 292 K/UL (ref 150–450)
PMV BLD AUTO: 9.6 FL (ref 9.2–12.9)
POTASSIUM SERPL-SCNC: 3.6 MMOL/L (ref 3.5–5.1)
RBC # BLD AUTO: 3.27 M/UL (ref 4–5.4)
SODIUM SERPL-SCNC: 140 MMOL/L (ref 136–145)
WBC # BLD AUTO: 8.85 K/UL (ref 3.9–12.7)

## 2024-08-29 PROCEDURE — 36415 COLL VENOUS BLD VENIPUNCTURE: CPT | Performed by: COLON & RECTAL SURGERY

## 2024-08-29 PROCEDURE — 80048 BASIC METABOLIC PNL TOTAL CA: CPT | Performed by: COLON & RECTAL SURGERY

## 2024-08-29 PROCEDURE — 94799 UNLISTED PULMONARY SVC/PX: CPT

## 2024-08-29 PROCEDURE — 83735 ASSAY OF MAGNESIUM: CPT | Performed by: COLON & RECTAL SURGERY

## 2024-08-29 PROCEDURE — 85025 COMPLETE CBC W/AUTO DIFF WBC: CPT | Performed by: COLON & RECTAL SURGERY

## 2024-08-29 PROCEDURE — 99024 POSTOP FOLLOW-UP VISIT: CPT | Mod: ,,, | Performed by: COLON & RECTAL SURGERY

## 2024-08-29 PROCEDURE — 25000003 PHARM REV CODE 250: Performed by: COLON & RECTAL SURGERY

## 2024-08-29 PROCEDURE — 84100 ASSAY OF PHOSPHORUS: CPT | Performed by: COLON & RECTAL SURGERY

## 2024-08-29 PROCEDURE — 99900035 HC TECH TIME PER 15 MIN (STAT)

## 2024-08-29 RX ORDER — OXYCODONE HYDROCHLORIDE 5 MG/1
5 TABLET ORAL EVERY 6 HOURS PRN
Qty: 15 TABLET | Refills: 0 | Status: SHIPPED | OUTPATIENT
Start: 2024-08-29

## 2024-08-29 RX ADMIN — OXYCODONE HYDROCHLORIDE 5 MG: 5 TABLET ORAL at 09:08

## 2024-08-29 RX ADMIN — FAMOTIDINE 20 MG: 20 TABLET ORAL at 09:08

## 2024-08-29 RX ADMIN — CHLORHEXIDINE GLUCONATE 0.12% ORAL RINSE 10 ML: 1.2 LIQUID ORAL at 09:08

## 2024-08-29 NOTE — DISCHARGE SUMMARY
[facility] ([facility])  Colorectal Surgery  Discharge Summary      Patient Name: Suri Hameed  MRN: [id number]  Admission Date: 8/27/2024  Hospital Length of Stay: 2 days  Discharge Date and Time:  08/29/2024 9:36 AM  Attending Physician: Chris Rosenthal MD   Discharging Provider: Chris Rosenthal MD  Primary Care Provider: [facility]    HPI:   40yo F with previous diverticulitis who presents for definitive surgical management    Procedure(s) (LRB):  XI ROBOTIC COLECTOMY, SIGMOID (Left)  SIGMOIDOSCOPY, FLEXIBLE  BLOCK, TRANSVERSUS ABDOMINIS PLANE (N/A)  WRAP-OMENTAL (N/A)      Indwelling Lines/Drains at time of discharge:   Lines/Drains/Airways       None                 Hospital Course: 08/27/2024: Underwent robotic partial left colectomy    08/28/2024: POD 1. Tolerating diet. Having multiple loose bowel movements and flatus. Pain well controlled.    08/29/2024: POD 2.  Continues to tolerate diet without nausea or vomiting.  Having bowel movements that are more formed now.  Passing flatus.  Pain well controlled.  Labs stable.  Ambulating well.  Ready for discharge.    Goals of Care Treatment Preferences:  Code Status: Full Code      Consults:     Significant Diagnostic Studies: Labs: BMP:   Recent Labs   Lab 08/28/24  0506 08/29/24  0501   * 86    140   K 4.1 3.6    107   CO2 23 24   BUN 10 9   CREATININE 0.9 0.8   CALCIUM 9.1 8.4*   MG 1.8 1.8    and CBC   Recent Labs   Lab 08/28/24  0506 08/29/24  0501   WBC 11.78 8.85   HGB 9.7* 8.3*   HCT 30.5* 26.7*    292       Pending Diagnostic Studies:       Procedure Component Value Units Date/Time    Specimen to Pathology, Surgery General Surgery [[id number]] Collected: 08/27/24 1611    Order Status: Sent Lab Status: In process Updated: 08/28/24 0730    Specimen: Tissue           Final Active Diagnoses:    Diagnosis Date Noted POA    PRINCIPAL PROBLEM:  Diverticulitis [K57.92] 04/16/2024 Yes    Uncomplicated asthma  [J45.909] 04/16/2024 Yes      Problems Resolved During this Admission:      Discharged Condition: good    Disposition: Home or Self Care    Follow Up:   Follow-up Information       Chris Rosenthal MD Follow up in 2 week(s).    Specialty: Colon and Rectal Surgery  Why: psotop check  Contact information:  [address] [place] [address]  [phone]                           Patient Instructions:      X-Ray Chest 1 View Pre-OP   Standing Status: Future Number of Occurrences: 1 Standing Exp. Date: 08/19/25     Order Specific Question Answer Comments   May the Radiologist modify the order per protocol to meet the clinical needs of the patient? Yes    Release to patient Immediate      Ambulatory referral/consult to Pre-Admit   Standing Status: Future   Referral Priority: Routine Referral Type: Consultation   Referral Reason: Specialty Services Required   Requested Specialty: Internal Medicine   Number of Visits Requested: 1     Diet Adult Regular     Notify your health care provider if you experience any of the following:  temperature >100.4     Notify your health care provider if you experience any of the following:  increased confusion or weakness     Notify your health care provider if you experience any of the following:  severe persistent headache     Notify your health care provider if you experience any of the following:  difficulty breathing or increased cough     Notify your health care provider if you experience any of the following:  persistent dizziness, light-headedness, or visual disturbances     Notify your health care provider if you experience any of the following:  worsening rash     Notify your health care provider if you experience any of the following:  redness, tenderness, or signs of infection (pain, swelling, redness, odor or green/yellow discharge around incision site)     Notify your health care provider if you experience any of the following:  severe uncontrolled pain      Notify your health care provider if you experience any of the following:  persistent nausea and vomiting or diarrhea     No dressing needed     Lifting restrictions   Order Comments: No lifting over 10 lb for 6 weeks after surgery     No driving until:   Order Comments: Off narcotic pain medication and able to safely react to other drivers     Other restrictions (specify):   Order Comments: Showers only for 2 weeks.  No baths or submerging incisions underneath water     EKG 12-lead   Standing Status: Future Number of Occurrences: 1 Standing Exp. Date: 08/19/25     Activity as tolerated     Medications:  Reconciled Home Medications:      Medication List        START taking these medications      oxyCODONE 5 MG immediate release tablet  Commonly known as: ROXICODONE  Take 1 tablet (5 mg total) by mouth every 6 (six) hours as needed for Pain.            CONTINUE taking these medications      albuterol 90 mcg/actuation inhaler  Commonly known as: PROVENTIL/VENTOLIN HFA  INHALE 2 PUFFS BY MOUTH FOUR TIMES A DAY AS NEEDED FOR ASTHMA ATTACK     fexofenadine 180 MG tablet  Commonly known as: ALLEGRA  180 mg.     fluticasone propionate 50 mcg/actuation nasal spray  Commonly known as: FLONASE  INSTILL 1 SPRAY IN EACH NOSTRIL EVERY DAY FOR ALLERGIES            STOP taking these medications      HYDROcodone-acetaminophen 5-325 mg per tablet  Commonly known as: NORCO            Time spent on the discharge of patient: 35 minutes    Chris Rosenthal MD  Colorectal Surgery  [facility] - Telemetry ([facility])

## 2024-08-29 NOTE — ASSESSMENT & PLAN NOTE
40yo F with previous diverticulitis who is now s/p robotic partial left colectomy on 8/27/24    - Doing well postop. Multiple loose stools. Will monitor electrolytes and await further bowel movements prior to discharge  - cont reg diet  - daily labs  - PO pain control  - OOB, ambulation encouraged  - IS 10xs/hr while awake  - Ppx: LVNX

## 2024-08-29 NOTE — PLAN OF CARE
Patient discharged with personal belongings. Discharge education and medications reviewed with patient via virtual nurse. LDA and telemetry box removed per MD order. Discharge medications delivered to beside.      Problem: Adult Inpatient Plan of Care  Goal: Plan of Care Review  Outcome: Met  Goal: Patient-Specific Goal (Individualized)  Outcome: Met  Goal: Absence of Hospital-Acquired Illness or Injury  Outcome: Met  Goal: Optimal Comfort and Wellbeing  Outcome: Met  Goal: Readiness for Transition of Care  Outcome: Met     Problem: Infection  Goal: Absence of Infection Signs and Symptoms  Outcome: Met     Problem: Wound  Goal: Optimal Coping  Outcome: Met  Goal: Optimal Functional Ability  Outcome: Met  Goal: Absence of Infection Signs and Symptoms  Outcome: Met  Goal: Improved Oral Intake  Outcome: Met  Goal: Optimal Pain Control and Function  Outcome: Met  Goal: Skin Health and Integrity  Outcome: Met  Goal: Optimal Wound Healing  Outcome: Met     Problem: Fall Injury Risk  Goal: Absence of Fall and Fall-Related Injury  Outcome: Met     Problem: Skin Injury Risk Increased  Goal: Skin Health and Integrity  Outcome: Met

## 2024-08-29 NOTE — PLAN OF CARE
[facility] - Tele[facility] (Hospital)  Discharge Final Note    Primary Care Provider: Neelam Benjamin    Expected Discharge Date: 8/29/2024    Final Discharge Note (most recent)       Final Note - 08/29/24 0940          Final Note    Assessment Type Final Discharge Note     Anticipated Discharge Disposition Home or Self Care        Post-Acute Status    Coverage [employer]     Discharge Delays None known at this time                     Important Message from Medicare             Contact Info       Chris Rosenthal MD   Specialty: Colon and Rectal Surgery    [address] [place] [address] [address] [place] [address]   Phone: [phone]       Next Steps: Follow up in 2 week(s)    Instructions: psotop check          DC Disposition: home with family  Family Notified: Patient at bedside  Transportation: personal transportation, cousin    Patient had no equipment or placement needs from .     Patient has resources to schedule hospital follow up with non-[facility] PCP on [facility].

## 2024-08-29 NOTE — PROGRESS NOTES
[facility] - Tele[facility] ([facility])  Colorectal Surgery  Progress Note    Subjective:     Interval History: Tolerating diet. Having multiple loose bowel movements and flatus. Pain well controlled.    Medications:  Continuous Infusions:   lactated ringers   Intravenous Continuous 75 mL/hr at 08/28/24 1823 New Bag at 08/28/24 1823     Scheduled Meds:   chlorhexidine  10 mL Mouth/Throat BID    enoxparin  40 mg Subcutaneous Daily    famotidine  20 mg Oral BID    fluticasone propionate  1 spray Each Nostril Daily    senna-docusate 8.6-50 mg  1 tablet Oral BID     PRN Meds:  Current Facility-Administered Medications:     albuterol sulfate, 2.5 mg, Nebulization, Q4H PRN    dextrose 10%, 12.5 g, Intravenous, PRN    dextrose 10%, 25 g, Intravenous, PRN    diphenhydrAMINE, 12.5 mg, Intravenous, Q6H PRN    morphine, 2 mg, Intravenous, Q3H PRN    ondansetron, 4 mg, Intravenous, Q6H PRN    oxyCODONE, 10 mg, Oral, Q4H PRN    oxyCODONE, 5 mg, Oral, Q4H PRN     Review of patient's allergies indicates:  No Known Allergies  Objective:     Vital Signs (Most Recent):  Temp: 98.5 °F (36.9 °C) (08/28/24 1547)  Pulse: 81 (08/28/24 1547)  Resp: 18 (08/28/24 1547)  BP: 110/63 (08/28/24 1547)  SpO2: 99 % (08/28/24 1547) Vital Signs (24h Range):  Temp:  [98.1 °F (36.7 °C)-98.6 °F (37 °C)] 98.5 °F (36.9 °C)  Pulse:  [81-96] 81  Resp:  [15-20] 18  SpO2:  [97 %-100 %] 99 %  BP: (110-144)/(63-90) 110/63     Weight: 92.7 kg (204 lb 4.1 oz)  Body mass index is 30.16 kg/m².    Intake/Output - Last 3 Shifts         08/26 0700 08/27 0659 08/27 0700 08/28 0659 08/28 0700 08/29 0659    I.V. (mL/kg)   1788.8 (19.3)    IV Piggyback  1200 169.1    Total Intake(mL/kg)  1200 (13) 1957.9 (21.1)    Urine (mL/kg/hr)  1400 1550 (1.3)    Stool   2    Blood  25     Total Output  1425 1552    Net  -225 +405.9           Stool Occurrence   1 x             Physical Exam  Constitutional:       Appearance: She is well-developed.   HENT:      Head: Normocephalic and  atraumatic.   Eyes:      Conjunctiva/sclera: Conjunctivae normal.   Neck:      Thyroid: No thyromegaly.   Cardiovascular:      Rate and Rhythm: Normal rate and regular rhythm.   Pulmonary:      Effort: Pulmonary effort is normal. No respiratory distress.   Abdominal:      Comments: Soft, mild distention, appropriately TTP; incisions c/d/I without erythema or drainage   Musculoskeletal:         General: No tenderness. Normal range of motion.      Cervical back: Normal range of motion.   Skin:     General: Skin is warm and dry.      Capillary Refill: Capillary refill takes less than 2 seconds.      Findings: No rash.   Neurological:      Mental Status: She is alert and oriented to person, place, and time.          Significant Labs:  I have reviewed all pertinent lab results within the past 24 hours.  CBC:   Recent Labs   Lab 08/28/24  0506   WBC 11.78   RBC 3.78*   HGB 9.7*   HCT 30.5*      MCV 81*   MCH 25.7*   MCHC 31.8*     BMP:   Recent Labs   Lab 08/28/24  0506   *      K 4.1      CO2 23   BUN 10   CREATININE 0.9   CALCIUM 9.1   MG 1.8     CMP:   Recent Labs   Lab 08/28/24  0506   *   CALCIUM 9.1      K 4.1   CO2 23      BUN 10   CREATININE 0.9       Significant Diagnostics:  I have reviewed all pertinent imaging results/findings within the past 24 hours.  Assessment/Plan:     Diverticulitis  40yo F with previous diverticulitis who is now s/p robotic partial left colectomy on 8/27/24    - Doing well postop. Multiple loose stools. Will monitor electrolytes and await further bowel movements prior to discharge  - cont reg diet  - daily labs  - PO pain control  - OOB, ambulation encouraged  - IS 10xs/hr while awake  - Ppx: LVNX    Uncomplicated asthma  Home meds and PRN meds        Chris Rosenthal MD  Colorectal Surgery  [facility] - Tele[facility] ([facility])

## 2024-08-29 NOTE — HOSPITAL COURSE
08/27/2024: Underwent robotic partial left colectomy    08/28/2024: POD 1. Tolerating diet. Having multiple loose bowel movements and flatus. Pain well controlled.    08/29/2024: POD 2.  Continues to tolerate diet without nausea or vomiting.  Having bowel movements that are more formed now.  Passing flatus.  Pain well controlled.  Labs stable.  Ambulating well.  Ready for discharge.

## 2024-08-29 NOTE — SUBJECTIVE & OBJECTIVE
Interval History: Tolerating diet. Having multiple loose bowel movements and flatus. Pain well controlled.    Medications:  Continuous Infusions:   lactated ringers   Intravenous Continuous 75 mL/hr at 08/28/24 1823 New Bag at 08/28/24 1823     Scheduled Meds:   chlorhexidine  10 mL Mouth/Throat BID    enoxparin  40 mg Subcutaneous Daily    famotidine  20 mg Oral BID    fluticasone propionate  1 spray Each Nostril Daily    senna-docusate 8.6-50 mg  1 tablet Oral BID     PRN Meds:  Current Facility-Administered Medications:     albuterol sulfate, 2.5 mg, Nebulization, Q4H PRN    dextrose 10%, 12.5 g, Intravenous, PRN    dextrose 10%, 25 g, Intravenous, PRN    diphenhydrAMINE, 12.5 mg, Intravenous, Q6H PRN    morphine, 2 mg, Intravenous, Q3H PRN    ondansetron, 4 mg, Intravenous, Q6H PRN    oxyCODONE, 10 mg, Oral, Q4H PRN    oxyCODONE, 5 mg, Oral, Q4H PRN     Review of patient's allergies indicates:  No Known Allergies  Objective:     Vital Signs (Most Recent):  Temp: 98.5 °F (36.9 °C) (08/28/24 1547)  Pulse: 81 (08/28/24 1547)  Resp: 18 (08/28/24 1547)  BP: 110/63 (08/28/24 1547)  SpO2: 99 % (08/28/24 1547) Vital Signs (24h Range):  Temp:  [98.1 °F (36.7 °C)-98.6 °F (37 °C)] 98.5 °F (36.9 °C)  Pulse:  [81-96] 81  Resp:  [15-20] 18  SpO2:  [97 %-100 %] 99 %  BP: (110-144)/(63-90) 110/63     Weight: 92.7 kg (204 lb 4.1 oz)  Body mass index is 30.16 kg/m².    Intake/Output - Last 3 Shifts         08/26 0700 08/27 0659 08/27 0700 08/28 0659 08/28 0700 08/29 0659    I.V. (mL/kg)   1788.8 (19.3)    IV Piggyback  1200 169.1    Total Intake(mL/kg)  1200 (13) 1957.9 (21.1)    Urine (mL/kg/hr)  1400 1550 (1.3)    Stool   2    Blood  25     Total Output  1425 1552    Net  -225 +405.9           Stool Occurrence   1 x             Physical Exam  Constitutional:       Appearance: She is well-developed.   HENT:      Head: Normocephalic and atraumatic.   Eyes:      Conjunctiva/sclera: Conjunctivae normal.   Neck:      Thyroid: No  thyromegaly.   Cardiovascular:      Rate and Rhythm: Normal rate and regular rhythm.   Pulmonary:      Effort: Pulmonary effort is normal. No respiratory distress.   Abdominal:      Comments: Soft, mild distention, appropriately TTP; incisions c/d/I without erythema or drainage   Musculoskeletal:         General: No tenderness. Normal range of motion.      Cervical back: Normal range of motion.   Skin:     General: Skin is warm and dry.      Capillary Refill: Capillary refill takes less than 2 seconds.      Findings: No rash.   Neurological:      Mental Status: She is alert and oriented to person, place, and time.          Significant Labs:  I have reviewed all pertinent lab results within the past 24 hours.  CBC:   Recent Labs   Lab 08/28/24  0506   WBC 11.78   RBC 3.78*   HGB 9.7*   HCT 30.5*      MCV 81*   MCH 25.7*   MCHC 31.8*     BMP:   Recent Labs   Lab 08/28/24  0506   *      K 4.1      CO2 23   BUN 10   CREATININE 0.9   CALCIUM 9.1   MG 1.8     CMP:   Recent Labs   Lab 08/28/24  0506   *   CALCIUM 9.1      K 4.1   CO2 23      BUN 10   CREATININE 0.9       Significant Diagnostics:  I have reviewed all pertinent imaging results/findings within the past 24 hours.

## 2024-08-30 LAB
FINAL PATHOLOGIC DIAGNOSIS: NORMAL
GROSS: NORMAL
Lab: NORMAL

## 2024-09-16 ENCOUNTER — TELEPHONE (OUTPATIENT)
Dept: SURGERY | Facility: CLINIC | Age: 39
End: 2024-09-16
Payer: OTHER GOVERNMENT

## 2024-09-16 NOTE — TELEPHONE ENCOUNTER
Called and spoke with patient regarding post op appointment. Informed patient she is scheduled on September 17 at 3 pm with Tomer Bermudez. Patient verbalized understanding.    ----- Message from Jony Crum sent at 9/16/2024 12:36 PM CDT -----  Type:  Appointment Request         Name of Caller:pt  When is the first available appointment?No access  Symptoms:f/u  Would the patient rather a call back or a response via [facility]? call  Best Call Back Number:[phone]   Additional Information: Pt states she would like a call back regarding if she needs a follow up appt since surgery on 08/27/24. Please call pt with further info and assistance. Thank you.

## 2024-09-17 ENCOUNTER — OFFICE VISIT (OUTPATIENT)
Dept: SURGERY | Facility: CLINIC | Age: 39
End: 2024-09-17
Payer: OTHER GOVERNMENT

## 2024-09-17 VITALS
HEART RATE: 89 BPM | WEIGHT: 214.56 LBS | DIASTOLIC BLOOD PRESSURE: 80 MMHG | TEMPERATURE: 98 F | OXYGEN SATURATION: 99 % | SYSTOLIC BLOOD PRESSURE: 119 MMHG | BODY MASS INDEX: 31.69 KG/M2

## 2024-09-17 DIAGNOSIS — K57.92 DIVERTICULITIS: Primary | ICD-10-CM

## 2024-09-17 PROCEDURE — 99213 OFFICE O/P EST LOW 20 MIN: CPT | Mod: PBBFAC

## 2024-09-17 PROCEDURE — 99999 PR PBB SHADOW E&M-EST. PATIENT-LVL III: CPT | Mod: PBBFAC,,,

## 2024-09-17 PROCEDURE — 99024 POSTOP FOLLOW-UP VISIT: CPT | Mod: ,,,

## 2024-09-17 NOTE — PROGRESS NOTES
History & Physical    Subjective     CC: diverticulitis     History of Present Illness:  Patient is a 39 y.o. female presents with for hospital follow up s/p diverticulitis.  She originally presented to the ED for evaluation of LLQ abdominal pain.  Workup in ED was concerning for leukocytosis of 12k and a CT scan concerning for a phlegmon near the descending colon concerning for diverticulitis versus malignancy. There was an associated phlegmon in the mesentery. She was admitted to the hospital medical service and surgery was consulted for evaluation.  She stayed for about 2 days with IV antibiotics and bowel rest.  Was later discharged with p.o. antibiotics which she has completed.  She has no longer having any abdominal pain.  Reports previous history of diverticulitis requiring admission in [place] for IV antibiotics but did not require any percutaneous drainage or surgical intervention.  Otherwise, had several episodes of diverticulitis before that did not require hospitalization. She reports having colonoscopy in [place], [place] in 2023 where diverticulosis was seen as well as one polyp was removed. Denies any previous abdominal surgical history. Denies any fever, chills, nausea, vomiting, hematochezia or melena.  Since discharge, she has been tolerating a regular diet and not having any further abdominal pain; having normal bowel movements.    08/27/24: robotic partial L colectomy     Interval History:  Since the last clinic visit, the patient underwent elective robotic partial L colectomy for diverticulitis. She had an uncomplicated surgery and post op and was discharged 2 days post op once tolerating diet and having bowel function. Since discharge, patient has done well. She is tolerating a regular diet. Having normal bowel movements with some intermittent diarrhea. Abdominal soreness has improved - some jayesh-incisional soreness worse over largest port site. Denies nausea, vomiting, fevers, chills.      Review of patient's allergies indicates:  No Known Allergies    Current Outpatient Medications   Medication Sig Dispense Refill    albuterol (PROVENTIL/VENTOLIN HFA) 90 mcg/actuation inhaler INHALE 2 PUFFS BY MOUTH FOUR TIMES A DAY AS NEEDED FOR ASTHMA ATTACK      fexofenadine (ALLEGRA) 180 MG tablet 180 mg.      fluticasone propionate (FLONASE) 50 mcg/actuation nasal spray INSTILL 1 SPRAY IN EACH NOSTRIL EVERY DAY FOR ALLERGIES      oxyCODONE (ROXICODONE) 5 MG immediate release tablet Take 1 tablet (5 mg total) by mouth every 6 (six) hours as needed for Pain. (Patient not taking: Reported on 9/17/2024) 15 tablet 0     No current facility-administered medications for this visit.       Past Medical History:   Diagnosis Date    Mild intermittent asthma, uncomplicated     Sleep apnea      Past Surgical History:   Procedure Laterality Date    COLONOSCOPY N/A 7/17/2024    Procedure: COLONOSCOPY;  Surgeon: Chris Rosenthal MD;  Location: [facility];  Service: General;  Laterality: N/A;    FLEXIBLE SIGMOIDOSCOPY  8/27/2024    Procedure: SIGMOIDOSCOPY, FLEXIBLE;  Surgeon: Chris Rosenthal MD;  Location: [facility];  Service: General;;    INJECTION OF ANESTHETIC AGENT INTO TISSUE PLANE DEFINED BY TRANSVERSUS ABDOMINIS MUSCLE N/A 8/27/2024    Procedure: BLOCK, TRANSVERSUS ABDOMINIS PLANE;  Surgeon: Chris Rosenthal MD;  Location: [facility] OR;  Service: General;  Laterality: N/A;    ROBOT-ASSISTED LAPAROSCOPIC RESECTION OF SIGMOID COLON USING DA TYRONE XI Left 8/27/2024    Procedure: XI ROBOTIC COLECTOMY, SIGMOID;  Surgeon: Chris Rosenthal MD;  Location: [facility] OR;  Service: General;  Laterality: Left;  mobilization of splenic flexure    TONSILLECTOMY Bilateral 4/5/2024    Procedure: TONSILLECTOMY;  Surgeon: Juan M Cevallos MD;  Location: [facility] OR;  Service: ENT;  Laterality: Bilateral;    WRAP-OMENTAL N/A 8/27/2024    Procedure: WRAP-OMENTAL;  Surgeon: Chris Rosenthal MD;  Location: [facility] OR;  Service: General;   Laterality: N/A;     Family History   Problem Relation Name Age of Onset    Heart disease Father      Seizures Sister       Social History     Tobacco Use    Smoking status: Never     Passive exposure: Never   Substance Use Topics    Alcohol use: Yes     Comment: 3 cocktails weekly    Drug use: Never        Review of Systems:  Review of Systems   Constitutional:  Negative for activity change, appetite change, chills, fatigue, fever and unexpected weight change.   HENT:  Negative for congestion, ear pain, sore throat and trouble swallowing.    Eyes:  Negative for pain, redness and itching.   Respiratory:  Negative for cough, shortness of breath and wheezing.    Cardiovascular:  Negative for chest pain, palpitations and leg swelling.   Gastrointestinal:  Positive for abdominal pain. Negative for abdominal distention, anal bleeding, blood in stool, constipation, diarrhea, nausea, rectal pain and vomiting.   Endocrine: Negative for cold intolerance, heat intolerance and polyuria.   Genitourinary:  Negative for dysuria, flank pain, frequency and hematuria.   Musculoskeletal:  Negative for gait problem, joint swelling and neck pain.   Skin:  Negative for color change, rash and wound.   Allergic/Immunologic: Negative for environmental allergies and immunocompromised state.   Neurological:  Negative for dizziness, speech difficulty, weakness and numbness.   Psychiatric/Behavioral:  Negative for agitation, confusion and hallucinations.           Objective     Vital Signs (Most Recent)  Temp: 98.3 °F (36.8 °C) (09/17/24 1447)  Pulse: 89 (09/17/24 1447)  BP: 119/80 (09/17/24 1447)  SpO2: 99 % (09/17/24 1447)     97.3 kg (214 lb 9.2 oz)     Physical Exam:  Physical Exam  Vitals reviewed.   Constitutional:       General: She is not in acute distress.     Appearance: Normal appearance. She is well-developed. She is not ill-appearing or toxic-appearing.   HENT:      Head: Normocephalic and atraumatic.      Right Ear: External ear  normal.      Left Ear: External ear normal.      Nose: Nose normal.   Cardiovascular:      Rate and Rhythm: Normal rate.   Pulmonary:      Effort: Pulmonary effort is normal. No respiratory distress.   Abdominal:      General: Abdomen is flat. There is no distension.      Palpations: Abdomen is soft.      Tenderness: There is no abdominal tenderness.      Comments: Soft, non-distended, appropriate post op jayesh-incisional TTP. Port sites c/d/I, well healing, without erythema or drainage.   Musculoskeletal:         General: Normal range of motion.      Cervical back: Normal range of motion and neck supple.   Skin:     General: Skin is warm and dry.   Neurological:      Mental Status: She is alert and oriented to person, place, and time.   Psychiatric:         Mood and Affect: Mood normal.         Behavior: Behavior normal.       Diagnostic Results:  Component 3 wk ago   Final Pathologic Diagnosis 1. LEFT COLON, PARTIAL RESECTION:  Perforated diverticulitis and active peritonitis  Food impacted diverticuli    2. DISTAL MARGINS:  Portion of colonic with reactive changes    3. PROXIMAL MARGINS:  Portion of colon with no significant pathologic abnormality    4. ANASTOMOTIC DONUTS:  Portion of colon with no significant pathologic abnormality       Colonoscopy 07/2024: reviewed    EXAMINATION:  CT ABDOMEN PELVIS WITH IV CONTRAST     CLINICAL HISTORY:  Abdominal pain, acute, nonlocalized;     TECHNIQUE:  Low dose axial images, sagittal and coronal reformations were obtained from the lung bases to the pubic symphysis following the IV administration of 100 mL of Omnipaque 350 and rectal contrast     COMPARISON:  Earlier imaging same date noncontrast     Impression:     Again there is regional mural thickening, underlying diverticulosis surrounding stranding and mesenteric prominent lymph nodes at the distal descending colon.  No overt perforation.  Associated mesenteric phlegmon possible 2 cm.  Again findings may be  inflammatory related to diverticulitis versus neoplastic.     Scant ascites present.     No obstructive bowel findings.     No sizable liver lesion     No adrenal gland mass     Pancreas, spleen and kidneys stable unremarkable.     Urinary bladder decompressed       Assessment and Plan     39 y.o. female with descending/sigmoid diverticulitis with mesenteric phlegmon s/p robotic partial left colectomy 08/27/24    -discussed pathophysiology of diverticulitis   -pathology reviewed  -Reassured that abdominal soreness she is describing is normal at this point post operatively and should continue to improve within 6 weeks after surgery. Pain worse over largest incision and worse with movement/activity. Agreed to let us know if the pain changes or persists or develops other symptoms.   -will need colonoscopy 1 year due to poor prep   -encouraged high fiber diet to prevent future diverticulum from forming. Recommend fiber supplementation also to assist with diarrhea  -RTC prn    Tomer Bermudez PA-C  Colon and Rectal Surgery  [facility]

## 2024-09-17 NOTE — LETTER
September 17, 2024      [facility]  [address] , [address]  [place] [address]  Phone: [phone]  Fax: [phone]       Patient: Suri Hameed   YOB: 1985  Date of Visit: 09/17/2024    To Whom It May Concern:    Dc Hameed  was under our care at [facility] on 08/24/25. Please excuse the patient from work missed. The patient may return to work/school on 09/18/24 with restrictions - no lifting >10lbs until 10/8/24. If you have any questions or concerns, or if I can be of further assistance, please do not hesitate to contact me.    Sincerely,    Tomer Bermudez PA-C     
not applicable

## 2024-09-30 ENCOUNTER — HOSPITAL ENCOUNTER (EMERGENCY)
Facility: HOSPITAL | Age: 39
Discharge: HOME | End: 2024-09-30
Attending: EMERGENCY MEDICINE
Payer: OTHER GOVERNMENT

## 2024-09-30 VITALS
RESPIRATION RATE: 18 BRPM | HEART RATE: 77 BPM | WEIGHT: 200 LBS | BODY MASS INDEX: 29.62 KG/M2 | HEIGHT: 69 IN | SYSTOLIC BLOOD PRESSURE: 116 MMHG | OXYGEN SATURATION: 100 % | DIASTOLIC BLOOD PRESSURE: 73 MMHG | TEMPERATURE: 98 F

## 2024-09-30 DIAGNOSIS — K76.9 HEPATIC LESION: ICD-10-CM

## 2024-09-30 DIAGNOSIS — R10.9 RIGHT SIDED ABDOMINAL PAIN: Primary | ICD-10-CM

## 2024-09-30 LAB
ALBUMIN SERPL BCP-MCNC: 4.1 G/DL (ref 3.5–5.2)
ALP SERPL-CCNC: 57 U/L (ref 55–135)
ALT SERPL W/O P-5'-P-CCNC: 12 U/L (ref 10–44)
ANION GAP SERPL CALC-SCNC: 12 MMOL/L (ref 8–16)
AST SERPL-CCNC: 11 U/L (ref 10–40)
B-HCG UR QL: NEGATIVE
BASOPHILS # BLD AUTO: 0.03 K/UL (ref 0–0.2)
BASOPHILS NFR BLD: 0.4 % (ref 0–1.9)
BILIRUB SERPL-MCNC: 0.4 MG/DL (ref 0.1–1)
BILIRUB UR QL STRIP: NEGATIVE
BUN SERPL-MCNC: 10 MG/DL (ref 6–20)
CALCIUM SERPL-MCNC: 9.9 MG/DL (ref 8.7–10.5)
CHLORIDE SERPL-SCNC: 106 MMOL/L (ref 95–110)
CLARITY UR: CLEAR
CO2 SERPL-SCNC: 23 MMOL/L (ref 23–29)
COLOR UR: YELLOW
CREAT SERPL-MCNC: 0.9 MG/DL (ref 0.5–1.4)
DIFFERENTIAL METHOD BLD: ABNORMAL
EOSINOPHIL # BLD AUTO: 0.1 K/UL (ref 0–0.5)
EOSINOPHIL NFR BLD: 1.9 % (ref 0–8)
ERYTHROCYTE [DISTWIDTH] IN BLOOD BY AUTOMATED COUNT: 14.6 % (ref 11.5–14.5)
EST. GFR  (NO RACE VARIABLE): >60 ML/MIN/1.73 M^2
GLUCOSE SERPL-MCNC: 82 MG/DL (ref 70–110)
GLUCOSE UR QL STRIP: NEGATIVE
HCT VFR BLD AUTO: 34.9 % (ref 37–48.5)
HGB BLD-MCNC: 11.1 G/DL (ref 12–16)
HGB UR QL STRIP: NEGATIVE
IMM GRANULOCYTES # BLD AUTO: 0.01 K/UL (ref 0–0.04)
IMM GRANULOCYTES NFR BLD AUTO: 0.1 % (ref 0–0.5)
KETONES UR QL STRIP: NEGATIVE
LEUKOCYTE ESTERASE UR QL STRIP: NEGATIVE
LIPASE SERPL-CCNC: 35 U/L (ref 4–60)
LYMPHOCYTES # BLD AUTO: 2.7 K/UL (ref 1–4.8)
LYMPHOCYTES NFR BLD: 40.3 % (ref 18–48)
MCH RBC QN AUTO: 25.8 PG (ref 27–31)
MCHC RBC AUTO-ENTMCNC: 31.8 G/DL (ref 32–36)
MCV RBC AUTO: 81 FL (ref 82–98)
MONOCYTES # BLD AUTO: 0.4 K/UL (ref 0.3–1)
MONOCYTES NFR BLD: 6.3 % (ref 4–15)
NEUTROPHILS # BLD AUTO: 3.4 K/UL (ref 1.8–7.7)
NEUTROPHILS NFR BLD: 51 % (ref 38–73)
NITRITE UR QL STRIP: NEGATIVE
NRBC BLD-RTO: 0 /100 WBC
PH UR STRIP: 6 [PH] (ref 5–8)
PLATELET # BLD AUTO: 358 K/UL (ref 150–450)
PMV BLD AUTO: 9.6 FL (ref 9.2–12.9)
POTASSIUM SERPL-SCNC: 3.4 MMOL/L (ref 3.5–5.1)
PROT SERPL-MCNC: 8.4 G/DL (ref 6–8.4)
PROT UR QL STRIP: ABNORMAL
RBC # BLD AUTO: 4.3 M/UL (ref 4–5.4)
SODIUM SERPL-SCNC: 141 MMOL/L (ref 136–145)
SP GR UR STRIP: 1.03 (ref 1–1.03)
URN SPEC COLLECT METH UR: ABNORMAL
UROBILINOGEN UR STRIP-ACNC: NEGATIVE EU/DL
WBC # BLD AUTO: 6.68 K/UL (ref 3.9–12.7)

## 2024-09-30 PROCEDURE — 81025 URINE PREGNANCY TEST: CPT | Performed by: NURSE PRACTITIONER

## 2024-09-30 PROCEDURE — 80053 COMPREHEN METABOLIC PANEL: CPT | Performed by: NURSE PRACTITIONER

## 2024-09-30 PROCEDURE — 81003 URINALYSIS AUTO W/O SCOPE: CPT | Performed by: NURSE PRACTITIONER

## 2024-09-30 PROCEDURE — 99285 EMERGENCY DEPT VISIT HI MDM: CPT | Mod: 25

## 2024-09-30 PROCEDURE — 83690 ASSAY OF LIPASE: CPT | Performed by: NURSE PRACTITIONER

## 2024-09-30 PROCEDURE — 85025 COMPLETE CBC W/AUTO DIFF WBC: CPT | Performed by: NURSE PRACTITIONER

## 2024-09-30 PROCEDURE — 25500020 PHARM REV CODE 255: Performed by: EMERGENCY MEDICINE

## 2024-09-30 RX ORDER — NAPROXEN 500 MG/1
500 TABLET ORAL 2 TIMES DAILY WITH MEALS
Qty: 10 TABLET | Refills: 0 | Status: SHIPPED | OUTPATIENT
Start: 2024-09-30

## 2024-09-30 RX ADMIN — IOHEXOL 100 ML: 350 INJECTION, SOLUTION INTRAVENOUS at 07:09

## 2024-09-30 NOTE — FIRST PROVIDER EVALUATION
Medical screening examination initiated.  I have conducted a focused provider triage encounter, findings are as follows:    Brief history of present illness:  reports worsening abdominal pain. Reports recent surgery    There were no vitals filed for this visit.    Pertinent physical exam:  nad    Brief workup plan:  labs, further eval    Preliminary workup initiated; this workup will be continued and followed by the physician or advanced practice provider that is assigned to the patient when roomed.

## 2024-09-30 NOTE — ED PROVIDER NOTES
SCRIBE #1 NOTE: I, Crispin Chicas, am scribing for, and in the presence of, Alfredo Noguera DO. I have scribed the entire note.       History     Chief Complaint   Patient presents with    Abdominal Pain     Pt. Had sx. On th 27th and Saturday she started with abd. Pain.      Review of patient's allergies indicates:  No Known Allergies      History of Present Illness     HPI    9/30/2024, 6:09 PM  History obtained from the patient      History of Present Illness: Suri Hameed is a 39 y.o. female patient with a PMHx of mild asthma and sleep apnea who presents to the Emergency Department for evaluation of abd pain which onset two days pta following diverticulitis sx on the 27th. Pt reports LUQ pain by an incision two days ago which has now subsided and moved to her RLQ by a different incision. Incisions appear to be healing as expected with no covert signs of infection. Symptoms are constant and moderate in severity. No mitigating or exacerbating factors reported. Patient denies any R flank pain, vomiting, constipation, diarrhea, and all other sxs at this time. Pt is currently taking no medications. No further complaints or concerns at this time.       Arrival mode: Personal vehicle    PCP: Tiera, Primary Doctor        Past Medical History:  Past Medical History:   Diagnosis Date    Mild intermittent asthma, uncomplicated     Sleep apnea        Past Surgical History:  Past Surgical History:   Procedure Laterality Date    COLONOSCOPY N/A 7/17/2024    Procedure: COLONOSCOPY;  Surgeon: Chris Rosenthal MD;  Location: [facility] ENDO;  Service: General;  Laterality: N/A;    FLEXIBLE SIGMOIDOSCOPY  8/27/2024    Procedure: SIGMOIDOSCOPY, FLEXIBLE;  Surgeon: Chris Rosenthal MD;  Location: [facility] OR;  Service: General;;    INJECTION OF ANESTHETIC AGENT INTO TISSUE PLANE DEFINED BY TRANSVERSUS ABDOMINIS MUSCLE N/A 8/27/2024    Procedure: BLOCK, TRANSVERSUS ABDOMINIS PLANE;  Surgeon: Chris Rosenthal MD;  Location:  [facility] OR;  Service: General;  Laterality: N/A;    ROBOT-ASSISTED LAPAROSCOPIC RESECTION OF SIGMOID COLON USING DA TYRONE XI Left 8/27/2024    Procedure: XI ROBOTIC COLECTOMY, SIGMOID;  Surgeon: Chris Rosenthal MD;  Location: [facility] OR;  Service: General;  Laterality: Left;  mobilization of splenic flexure    TONSILLECTOMY Bilateral 4/5/2024    Procedure: TONSILLECTOMY;  Surgeon: Juan M Cevallos MD;  Location: [facility] OR;  Service: ENT;  Laterality: Bilateral;    WRAP-OMENTAL N/A 8/27/2024    Procedure: WRAP-OMENTAL;  Surgeon: Chris Rosenthal MD;  Location: [facility] OR;  Service: General;  Laterality: N/A;         Family History:  Family History   Problem Relation Name Age of Onset    Heart disease Father      Seizures Sister         Social History:  Social History     Tobacco Use    Smoking status: Never     Passive exposure: Never    Smokeless tobacco: Not on file   Substance and Sexual Activity    Alcohol use: Yes     Comment: 3 cocktails weekly    Drug use: Never    Sexual activity: Yes        Review of Systems     Review of Systems   Gastrointestinal:  Positive for abdominal pain (RLQ). Negative for constipation, diarrhea and vomiting.   Genitourinary:  Negative for flank pain (R).      Physical Exam     Initial Vitals [09/30/24 1443]   BP Pulse Resp Temp SpO2   (!) 144/84 80 20 98 °F (36.7 °C) 98 %      MAP       --          Physical Exam  Constitutional:       Appearance: She is well-developed.   Cardiovascular:      Rate and Rhythm: Normal rate and regular rhythm.      Heart sounds: No murmur heard.  Pulmonary:      Effort: Pulmonary effort is normal.      Breath sounds: No wheezing.   Abdominal:      General: There is no distension.      Palpations: Abdomen is soft.      Tenderness: There is no abdominal tenderness. There is no guarding.   Musculoskeletal:         General: Normal range of motion.   Skin:     General: Skin is warm and dry.      Findings: No rash.      Comments: Incision sites are clean dry and  "intact   Neurological:      General: No focal deficit present.      Mental Status: She is alert and oriented to person, place, and time.       Nursing Notes and Vital Signs Reviewed.     ED Course   Procedures  ED Vital Signs:  Vitals:    09/30/24 1443 09/30/24 1815 09/30/24 1825 09/30/24 1847   BP: (!) 144/84 115/69  127/77   Pulse: 80 80  69   Resp: 20 20     Temp: 98 °F (36.7 °C) 98.2 °F (36.8 °C)     TempSrc: Oral Oral     SpO2: 98% 98%  100%   Weight: 90.7 kg (200 lb)      Height:   5' 9" (1.753 m)     09/30/24 2000 09/30/24 2032 09/30/24 2047 09/30/24 2102   BP: 125/63 112/67 112/67 116/73   Pulse: 67 72 73 77   Resp: 18  18    Temp:       TempSrc:       SpO2: (!) 93% 96% 100% 100%   Weight:       Height:           Abnormal Lab Results:  Labs Reviewed   CBC W/ AUTO DIFFERENTIAL - Abnormal       Result Value    WBC 6.68      RBC 4.30      Hemoglobin 11.1 (*)     Hematocrit 34.9 (*)     MCV 81 (*)     MCH 25.8 (*)     MCHC 31.8 (*)     RDW 14.6 (*)     Platelets 358      MPV 9.6      Immature Granulocytes 0.1      Gran # (ANC) 3.4      Immature Grans (Abs) 0.01      Lymph # 2.7      Mono # 0.4      Eos # 0.1      Baso # 0.03      nRBC 0      Gran % 51.0      Lymph % 40.3      Mono % 6.3      Eosinophil % 1.9      Basophil % 0.4      Differential Method Automated     COMPREHENSIVE METABOLIC PANEL - Abnormal    Sodium 141      Potassium 3.4 (*)     Chloride 106      CO2 23      Glucose 82      BUN 10      Creatinine 0.9      Calcium 9.9      Total Protein 8.4      Albumin 4.1      Total Bilirubin 0.4      Alkaline Phosphatase 57      AST 11      ALT 12      eGFR >60      Anion Gap 12     URINALYSIS, REFLEX TO URINE CULTURE - Abnormal    Specimen UA Urine, Clean Catch      Color, UA Yellow      Appearance, UA Clear      pH, UA 6.0      Specific Gravity, UA 1.030      Protein, UA Trace (*)     Glucose, UA Negative      Ketones, UA Negative      Bilirubin (UA) Negative      Occult Blood UA Negative      Nitrite, UA " Negative      Urobilinogen, UA Negative      Leukocytes, UA Negative      Narrative:     Specimen Source->Urine   LIPASE    Lipase 35     PREGNANCY TEST, URINE RAPID    Preg Test, Ur Negative      Narrative:     Specimen Source->Urine        All Lab Results:  Results for orders placed or performed during the hospital encounter of 09/30/24   CBC auto differential    Collection Time: 09/30/24  3:41 PM   Result Value Ref Range    WBC 6.68 3.90 - 12.70 K/uL    RBC 4.30 4.00 - 5.40 M/uL    Hemoglobin 11.1 (L) 12.0 - 16.0 g/dL    Hematocrit 34.9 (L) 37.0 - 48.5 %    MCV 81 (L) 82 - 98 fL    MCH 25.8 (L) 27.0 - 31.0 pg    MCHC 31.8 (L) 32.0 - 36.0 g/dL    RDW 14.6 (H) 11.5 - 14.5 %    Platelets 358 150 - 450 K/uL    MPV 9.6 9.2 - 12.9 fL    Immature Granulocytes 0.1 0.0 - 0.5 %    Gran # (ANC) 3.4 1.8 - 7.7 K/uL    Immature Grans (Abs) 0.01 0.00 - 0.04 K/uL    Lymph # 2.7 1.0 - 4.8 K/uL    Mono # 0.4 0.3 - 1.0 K/uL    Eos # 0.1 0.0 - 0.5 K/uL    Baso # 0.03 0.00 - 0.20 K/uL    nRBC 0 0 /100 WBC    Gran % 51.0 38.0 - 73.0 %    Lymph % 40.3 18.0 - 48.0 %    Mono % 6.3 4.0 - 15.0 %    Eosinophil % 1.9 0.0 - 8.0 %    Basophil % 0.4 0.0 - 1.9 %    Differential Method Automated    Comprehensive metabolic panel    Collection Time: 09/30/24  3:41 PM   Result Value Ref Range    Sodium 141 136 - 145 mmol/L    Potassium 3.4 (L) 3.5 - 5.1 mmol/L    Chloride 106 95 - 110 mmol/L    CO2 23 23 - 29 mmol/L    Glucose 82 70 - 110 mg/dL    BUN 10 6 - 20 mg/dL    Creatinine 0.9 0.5 - 1.4 mg/dL    Calcium 9.9 8.7 - 10.5 mg/dL    Total Protein 8.4 6.0 - 8.4 g/dL    Albumin 4.1 3.5 - 5.2 g/dL    Total Bilirubin 0.4 0.1 - 1.0 mg/dL    Alkaline Phosphatase 57 55 - 135 U/L    AST 11 10 - 40 U/L    ALT 12 10 - 44 U/L    eGFR >60 >60 mL/min/1.73 m^2    Anion Gap 12 8 - 16 mmol/L   Lipase    Collection Time: 09/30/24  3:41 PM   Result Value Ref Range    Lipase 35 4 - 60 U/L   Urinalysis, Reflex to Urine Culture Urine, Clean Catch    Collection Time:  09/30/24  3:52 PM    Specimen: Urine   Result Value Ref Range    Specimen UA Urine, Clean Catch     Color, UA Yellow Yellow, Straw, Tammie    Appearance, UA Clear Clear    pH, UA 6.0 5.0 - 8.0    Specific Gravity, UA 1.030 1.005 - 1.030    Protein, UA Trace (A) Negative    Glucose, UA Negative Negative    Ketones, UA Negative Negative    Bilirubin (UA) Negative Negative    Occult Blood UA Negative Negative    Nitrite, UA Negative Negative    Urobilinogen, UA Negative <2.0 EU/dL    Leukocytes, UA Negative Negative   Pregnancy, urine rapid (UPT)    Collection Time: 09/30/24  3:52 PM   Result Value Ref Range    Preg Test, Ur Negative          Imaging Results:  Imaging Results              CT Abdomen Pelvis With IV Contrast NO Oral Contrast (Final result)  Result time 09/30/24 19:59:07      Final result by Marly Garcia MD (09/30/24 19:59:07)                   Impression:      Left hepatic enhancing lesion and scant ascites      Electronically signed by: Marly Garcia  Date:    09/30/2024  Time:    19:59               Narrative:    EXAMINATION:  CT ABDOMEN PELVIS WITH IV CONTRAST    CLINICAL HISTORY:  RLQ abdominal pain (Age >= 14y);s/p partial L colectomy sec to diverticulitis 8/27/24;    TECHNIQUE:  Low dose axial images, sagittal and coronal reformations were obtained from the lung bases to the pubic symphysis following the IV administration of 100 mL of Omnipaque 350 iterative technique arteries closure    COMPARISON:  04/15/2002    FINDINGS:  For enhancing lesion left liver 22 mm.  Right lobe liver heterogeneous attenuation.  Gallbladder present contracted    Pancreas unremarkable    Spleen normal size    No adrenal gland mass    Kidneys stable without hydronephrosis    Distal colonic/colorectal surgical change.  Scant ascites.    No signs of appendicitis    Shotty lymph nodes present retroperitoneal and mesenteric    Urinary bladder decompressed.    No obstructive bowel findings                                             The Emergency Provider reviewed the vital signs and test results, which are outlined above.     ED Discussion   8:30pm:  Discussed hepatic lesion noted on CT scan with Dr. Sahu, hepatology, states that the lesion is too small and patient can be seen outpatient.    9:06 PM: Reassessed pt at this time. Discussed with pt all pertinent ED information and results. Discussed pt dx and plan of tx. Gave pt all f/u and return to the ED instructions. All questions and concerns were addressed at this time. Pt expresses understanding of information and instructions, and is comfortable with plan to discharge. Pt is stable for discharge.    I discussed with patient and/or family/caretaker that evaluation in the ED does not suggest any emergent or life threatening medical conditions requiring immediate intervention beyond what was provided in the ED, and I believe patient is safe for discharge.  Regardless, an unremarkable evaluation in the ED does not preclude the development or presence of a serious of life threatening condition. As such, patient was instructed to return immediately for any worsening or change in current symptoms.      ED Course as of 10/01/24 0301   Mon Sep 30, 2024   1818 Urinalysis, Reflex to Urine Culture Urine, Clean Catch(!)  No UTI [CD]   1818 Pregnancy, urine rapid (UPT)  Negative [CD]   1818 Lipase  Within normal limits [CD]   1818 Comprehensive metabolic panel(!)  Mild hypokalemia [CD]   1819 CBC auto differential(!)  Nonspecific findings [CD]   2017 CT Abdomen Pelvis With IV Contrast NO Oral Contrast  Left hepatic enhancing lesion and scant ascites      [CD]      ED Course User Index  [CD] Alfredo Noguera, DO     Medical Decision Making  Patient able to tolerate fluids.  Outpatient referral to hepatology placed.  She is instructed to return immediately for any new or worsening symptoms and she verbalized understanding.    Amount and/or Complexity of Data Reviewed  External  Data Reviewed: radiology.     Details: Reviewed CT 4/15/24: Last CT scan showing no liver lesion.  Labs: ordered. Decision-making details documented in ED Course.  Radiology: ordered. Decision-making details documented in ED Course.    Risk  Prescription drug management.  Risk Details: Differential diagnosis includes but is not limited to:  Intra-abdominal infection, ileus, bowel obstruction, UTI, ureteral stone, incidental hepatic finding, electrolyte abnormality                ED Medication(s):  Medications   iohexoL (OMNIPAQUE 350) injection 100 mL (100 mLs Intravenous Given 9/30/24 1915)       Discharge Medication List as of 9/30/2024  9:04 PM        START taking these medications    Details   naproxen (NAPROSYN) 500 MG tablet Take 1 tablet (500 mg total) by mouth 2 (two) times daily with meals., Starting Mon 9/30/2024, Normal              Follow-up Information       Schedule an appointment as soon as possible for a visit  with Iesha Moreno MD.    Specialty: Internal Medicine  Contact information:  [address] [address]  [phone]                                 Scribe Attestation:   Scribe #1: I performed the above scribed service and the documentation accurately describes the services I performed. I attest to the accuracy of the note.     Attending:   Physician Attestation Statement for Scribe #1: I, Alfredo Noguera DO, personally performed the services described in this documentation, as scribed by Crispin Chicas, in my presence, and it is both accurate and complete.           Clinical Impression       ICD-10-CM ICD-9-CM   1. Right sided abdominal pain  R10.9 789.09   2. Hepatic lesion  K76.9 573.8       Disposition:   Disposition: Discharged  Condition: Stable         Alfredo Noguera DO  10/01/24 0304

## 2024-10-02 ENCOUNTER — PATIENT MESSAGE (OUTPATIENT)
Dept: HEPATOLOGY | Facility: CLINIC | Age: 39
End: 2024-10-02
Payer: OTHER GOVERNMENT

## 2024-11-04 ENCOUNTER — TELEPHONE (OUTPATIENT)
Dept: HEPATOLOGY | Facility: HOSPITAL | Age: 39
End: 2024-11-04
Payer: OTHER GOVERNMENT

## 2024-11-04 NOTE — TELEPHONE ENCOUNTER
"Patient: Suri Hameed       MRN: [id number]      : 1985     Age: 39 y.o.  [address]  [address]  [place] [address]      Providers  Hepatologists: Ramez Sahu MD    Priority of review: Other    Patient Transplant Status: Other      Reason for presentation: Non-Transplant    Clinical Summary: Patient is a 39 y.o. female presents with for hospital follow up s/p diverticulitis. Incidentally detected liver cirrhosis with liver lesdion    Imaging to be reviewed: CT    HCC Treatment History:       Platelets:   Lab Results   Component Value Date/Time     2024 03:41 PM     Creatinine:   Lab Results   Component Value Date/Time    CREATININE 0.9 2024 03:41 PM     Bilirubin:   Lab Results   Component Value Date/Time    BILITOT 0.4 2024 03:41 PM     AFP Last 3 each if available: No results found for: "AFP", "EXTAFP"    MELD: Computed MELD 3.0 unavailable. One or more values for this score either were not found within the given timeframe or did not fit some other criterion.  Computed MELD-Na unavailable. One or more values for this score either were not found within the given timeframe or did not fit some other criterion.      Plan:     Follow-up Provider:   "

## 2024-11-12 ENCOUNTER — CONFERENCE (OUTPATIENT)
Dept: TRANSPLANT | Facility: CLINIC | Age: 39
End: 2024-11-12
Payer: OTHER GOVERNMENT

## 2024-11-12 NOTE — TELEPHONE ENCOUNTER
"Patient: Suri Hameed       MRN: [id number]      : 1985     Age: 39 y.o.  [address]  [address]  [place] [address]     Presenting Radiologists: Jesse Vargas MD        Providers  Hepatologists: Ramez Sahu MD     Priority of review: Other     Patient Transplant Status: Other       Reason for presentation: Non-Transplant     Clinical Summary: Patient is a 39 y.o. female presents with for hospital follow up s/p diverticulitis. Incidentally detected liver cirrhosis with liver lesdion     Imaging to be reviewed: CT     HCC Treatment History:         Platelets:         Lab Results   Component Value Date/Time      2024 03:41 PM      Creatinine:         Lab Results   Component Value Date/Time     CREATININE 0.9 2024 03:41 PM      Bilirubin:         Lab Results   Component Value Date/Time     BILITOT 0.4 2024 03:41 PM      AFP Last 3 each if available: No results found for: "AFP", "EXTAFP"     MELD: Computed MELD 3.0 unavailable. One or more values for this score either were not found within the given timeframe or did not fit some other criterion.  Computed MELD-Na unavailable. One or more values for this score either were not found within the given timeframe or did not fit some other criterion.        Plan: 2.1 cm enhancing lesion s2 with central scar? (1.9 cm on 4/15).   Looks like FNH. Need MRI with Eovist hepatobiliary phase.   Hypodense observation in s4b.         Discussion:  the mass has the appearance of FNH, was present on scan from April.   Needs a MRI with delayed hepatobiliary phase Just to confirm.   Normal tumor markers. Cirrhosis present unknown origin.    MRI with eovist      Follow-up Provider: Dr Sahu      "

## 2024-11-19 ENCOUNTER — PATIENT MESSAGE (OUTPATIENT)
Dept: HEPATOLOGY | Facility: CLINIC | Age: 39
End: 2024-11-19
Payer: OTHER GOVERNMENT

## 2024-11-21 ENCOUNTER — HOSPITAL ENCOUNTER (EMERGENCY)
Facility: HOSPITAL | Age: 39
Discharge: HOME | End: 2024-11-21
Attending: EMERGENCY MEDICINE
Payer: OTHER GOVERNMENT

## 2024-11-21 VITALS
DIASTOLIC BLOOD PRESSURE: 73 MMHG | SYSTOLIC BLOOD PRESSURE: 124 MMHG | HEART RATE: 81 BPM | OXYGEN SATURATION: 98 % | HEIGHT: 69 IN | WEIGHT: 209 LBS | RESPIRATION RATE: 16 BRPM | TEMPERATURE: 98 F | BODY MASS INDEX: 30.96 KG/M2

## 2024-11-21 DIAGNOSIS — M79.641 RIGHT HAND PAIN: Primary | ICD-10-CM

## 2024-11-21 PROCEDURE — 99283 EMERGENCY DEPT VISIT LOW MDM: CPT | Mod: 25

## 2024-11-21 RX ORDER — BUTALBITAL, ACETAMINOPHEN AND CAFFEINE 50; 325; 40 MG/1; MG/1; MG/1
1 TABLET ORAL EVERY 4 HOURS PRN
Qty: 12 TABLET | Refills: 0 | Status: SHIPPED | OUTPATIENT
Start: 2024-11-21 | End: 2024-12-21

## 2024-11-21 NOTE — ED PROVIDER NOTES
Encounter Date: 11/21/2024       History     Chief Complaint   Patient presents with    Hand Injury     Sprained thumb over a week ago, was told if not better to see ortho right hand     39-year-old female presenting to the emergency department complaining of right hand pain x1 week.  Patient hyperextended her thumb whenever she was putting on elastic pains.  Patient was seen at the urgent care; XR negative for acute fractures.  Patient was informed that if pain did not decrease or worsened to go to the emergency department in order to get a follow-up with ortho.  Patient arrived to the emergency department in a thumb spica splint.  Patient works as a  and not able to perform duties right now.  Denies fever, joint swelling, redness, swelling, decreased range of motion, and all other symptoms.    The history is provided by the patient.     Review of patient's allergies indicates:  No Known Allergies  Past Medical History:   Diagnosis Date    Mild intermittent asthma, uncomplicated     Sleep apnea      Past Surgical History:   Procedure Laterality Date    COLONOSCOPY N/A 7/17/2024    Procedure: COLONOSCOPY;  Surgeon: Chris Rosenthal MD;  Location: [facility] ENDO;  Service: General;  Laterality: N/A;    FLEXIBLE SIGMOIDOSCOPY  8/27/2024    Procedure: SIGMOIDOSCOPY, FLEXIBLE;  Surgeon: Chris Rosenthal MD;  Location: [facility] OR;  Service: General;;    INJECTION OF ANESTHETIC AGENT INTO TISSUE PLANE DEFINED BY TRANSVERSUS ABDOMINIS MUSCLE N/A 8/27/2024    Procedure: BLOCK, TRANSVERSUS ABDOMINIS PLANE;  Surgeon: Chris Rosenthal MD;  Location: [facility] OR;  Service: General;  Laterality: N/A;    ROBOT-ASSISTED LAPAROSCOPIC RESECTION OF SIGMOID COLON USING DA TYRONE XI Left 8/27/2024    Procedure: XI ROBOTIC COLECTOMY, SIGMOID;  Surgeon: Chris Rosenthal MD;  Location: [facility] OR;  Service: General;  Laterality: Left;  mobilization of splenic flexure    TONSILLECTOMY Bilateral 4/5/2024    Procedure: TONSILLECTOMY;   Surgeon: Juan M Cevallos MD;  Location: [facility] OR;  Service: ENT;  Laterality: Bilateral;    WRAP-OMENTAL N/A 8/27/2024    Procedure: WRAP-OMENTAL;  Surgeon: Chris Rosenthal MD;  Location: [facility] OR;  Service: General;  Laterality: N/A;     Family History   Problem Relation Name Age of Onset    Heart disease Father      Seizures Sister       Social History     Tobacco Use    Smoking status: Never     Passive exposure: Never   Substance Use Topics    Alcohol use: Yes     Comment: 3 cocktails weekly    Drug use: Never     Review of Systems   Constitutional:  Negative for fever.   HENT:  Negative for sore throat.    Respiratory:  Negative for shortness of breath.    Cardiovascular:  Negative for chest pain.   Gastrointestinal:  Negative for nausea.   Genitourinary:  Negative for dysuria.   Musculoskeletal:  Positive for arthralgias. Negative for back pain, joint swelling, myalgias and neck pain.   Skin:  Negative for rash.   Neurological:  Negative for weakness.   Hematological:  Does not bruise/bleed easily.   All other systems reviewed and are negative.      Physical Exam     Initial Vitals [11/21/24 1015]   BP Pulse Resp Temp SpO2   124/73 81 16 98.1 °F (36.7 °C) 98 %      MAP       --         Physical Exam    Nursing note reviewed.  Constitutional: She appears well-developed and well-nourished.  Non-toxic appearance. She does not have a sickly appearance. She does not appear ill. No distress.   HENT:   Head: Normocephalic and atraumatic.   Right Ear: Hearing normal.   Left Ear: Hearing normal.   Nose: Nose normal. Mouth/Throat: Uvula is midline and oropharynx is clear and moist.   Eyes: Conjunctivae, EOM and lids are normal. Pupils are equal, round, and reactive to light.   Neck: Trachea normal. Neck supple.   Normal range of motion.   Full passive range of motion without pain.     Cardiovascular:  Normal rate, regular rhythm, normal heart sounds, intact distal pulses and normal pulses.            Pulmonary/Chest: Effort normal and breath sounds normal.   Abdominal: Abdomen is soft. Bowel sounds are normal. There is no abdominal tenderness. There is no rebound and no guarding.   Musculoskeletal:      Cervical back: Normal, full passive range of motion without pain, normal range of motion and neck supple.      Comments: No redness, swelling, or obvious deformity to right thumb.  Full range of motion.  Mild tenderness to palpation.  Neurovascularly intact.  Capillary refill less than 2 seconds.     Neurological: She is alert and oriented to person, place, and time. She has normal strength and normal reflexes. No cranial nerve deficit or sensory deficit. GCS eye subscore is 4. GCS verbal subscore is 5. GCS motor subscore is 6.   Skin: Skin is warm and dry.   Psychiatric: She has a normal mood and affect. Her behavior is normal. Thought content normal.         ED Course   Procedures  Labs Reviewed - No data to display       Imaging Results              X-Ray Hand 3 view Right (Final result)  Result time 11/21/24 11:12:30      Final result by Arvin Shah MD (11/21/24 11:12:30)                   Impression:      No acute abnormality identified.      Electronically signed by: Arvin Shah  Date:    11/21/2024  Time:    11:12               Narrative:    EXAMINATION:  XR HAND COMPLETE 3 VIEW RIGHT    CLINICAL HISTORY:  right hand injury;    TECHNIQUE:  PA, lateral, and oblique views of the right hand were performed.    COMPARISON:  None    FINDINGS:  No fracture.  No traumatic malalignment.  No osseous destructive process.                                       Medications - No data to display  Medical Decision Making  Risk  Prescription drug management.  Risk Details: No fractures or acute injuries noted to x-ray.  Patient was provided with ortho follow-up.  Patient was prescribed Fioricet as needed for pain.  Patient was instructed to return to the emergency department if symptoms worsen.    I discussed  with patient that evaluation in the ED does not suggest any emergent or life threatening medical conditions requiring immediate intervention beyond what was provided in the ED, and I believe patient is safe for discharge. Regardless, an unremarkable evaluation in the ED does not preclude the development or presence of a serious of life threatening condition. As such, patient was instructed to return immediately for any worsening or change in current symptoms.                                       Clinical Impression:  Final diagnoses:  [M79.641] Right hand pain (Primary)          ED Disposition Condition    Discharge Stable          ED Prescriptions       Medication Sig Dispense Start Date End Date Auth. Provider    butalbital-acetaminophen-caffeine -40 mg (FIORICET, ESGIC) -40 mg per tablet Take 1 tablet by mouth every 4 (four) hours as needed. 12 tablet 11/21/2024 12/21/2024 Shireen Torres PA-C          Follow-up Information       Follow up With Specialties Details Why Contact Info    [facility] - Emergency Dept. Emergency Medicine  If symptoms worsen [id number] [facility] [address] [phone]             Shireen Torres PA-C  11/21/24 0180

## 2024-11-21 NOTE — Clinical Note
"Suri Malloy" Yoseph was seen and treated in our emergency department on 11/21/2024.  She may return to work on 11/25/2024.       If you have any questions or concerns, please don't hesitate to call.      Shireen Torres PA-C"

## 2024-12-02 ENCOUNTER — OFFICE VISIT (OUTPATIENT)
Dept: ORTHOPEDICS | Facility: CLINIC | Age: 39
End: 2024-12-02
Payer: OTHER GOVERNMENT

## 2024-12-02 VITALS — HEIGHT: 69 IN | WEIGHT: 209 LBS | BODY MASS INDEX: 30.96 KG/M2

## 2024-12-02 DIAGNOSIS — M79.641 RIGHT HAND PAIN: Primary | ICD-10-CM

## 2024-12-02 DIAGNOSIS — S63.641A SPRAIN OF ULNAR COLLATERAL LIGAMENT OF METACARPOPHALANGEAL (MCP) JOINT OF RIGHT THUMB, INITIAL ENCOUNTER: ICD-10-CM

## 2024-12-02 PROCEDURE — 99999 PR PBB SHADOW E&M-EST. PATIENT-LVL IV: CPT | Mod: PBBFAC,,, | Performed by: STUDENT IN AN ORGANIZED HEALTH CARE EDUCATION/TRAINING PROGRAM

## 2024-12-02 PROCEDURE — 99214 OFFICE O/P EST MOD 30 MIN: CPT | Mod: PBBFAC | Performed by: STUDENT IN AN ORGANIZED HEALTH CARE EDUCATION/TRAINING PROGRAM

## 2024-12-02 PROCEDURE — 99203 OFFICE O/P NEW LOW 30 MIN: CPT | Mod: S$PBB,,, | Performed by: STUDENT IN AN ORGANIZED HEALTH CARE EDUCATION/TRAINING PROGRAM

## 2024-12-02 NOTE — PROGRESS NOTES
Hand Surgery Clinic Note    Chief Complaint  Chief Complaint   Patient presents with    Right Hand - Pain       History of Present Illness  39-year-old right-hand dominant female  presents for evaluation of a right thumb injury.  Approximately 2-3 weeks ago, patient was putting on tight swinging her thumb got caught.  She was subsequent pain at the thumb as well as throbbing.  She noticed subsequent swelling.  In particular, she notes pain at the level of the MCP joint with activity.  She presented to the emergency department for this issue on 11/21/2024 and was provided with a thumb spica brace.  She was never injured this thumb in the past.  Pain level is a 7/10.  No numbness or tingling.  No history of diabetes.  Patient was not take blood thinners.  In particular, patient notes pain when she was doing her job as a .  She is unable to do this with the brace on so she must remove it while during her job.    Review of Systems  Review of systems negative for chest pain, shortness of breath, fevers, chills, nausea/vomiting.    Past Medical History  Past Medical History:   Diagnosis Date    Mild intermittent asthma, uncomplicated     Sleep apnea        Past Surgical History  Past Surgical History:   Procedure Laterality Date    COLONOSCOPY N/A 7/17/2024    Procedure: COLONOSCOPY;  Surgeon: Chris Rosenthal MD;  Location: [facility];  Service: General;  Laterality: N/A;    FLEXIBLE SIGMOIDOSCOPY  8/27/2024    Procedure: SIGMOIDOSCOPY, FLEXIBLE;  Surgeon: Chris Rosenthal MD;  Location: [facility] OR;  Service: General;;    INJECTION OF ANESTHETIC AGENT INTO TISSUE PLANE DEFINED BY TRANSVERSUS ABDOMINIS MUSCLE N/A 8/27/2024    Procedure: BLOCK, TRANSVERSUS ABDOMINIS PLANE;  Surgeon: Chris Rosenthal MD;  Location: [facility] OR;  Service: General;  Laterality: N/A;    ROBOT-ASSISTED LAPAROSCOPIC RESECTION OF SIGMOID COLON USING DA TYRONE XI Left 8/27/2024    Procedure: XI ROBOTIC COLECTOMY, SIGMOID;   Surgeon: Chris Rosenthal MD;  Location: [facility] OR;  Service: General;  Laterality: Left;  mobilization of splenic flexure    TONSILLECTOMY Bilateral 4/5/2024    Procedure: TONSILLECTOMY;  Surgeon: Juan M Cevallos MD;  Location: [facility] OR;  Service: ENT;  Laterality: Bilateral;    WRAP-OMENTAL N/A 8/27/2024    Procedure: WRAP-OMENTAL;  Surgeon: Chris Rosenthal MD;  Location: [facility] OR;  Service: General;  Laterality: N/A;       Allergies  Review of patient's allergies indicates:  No Known Allergies    Family History  Family History   Problem Relation Name Age of Onset    Heart disease Father      Seizures Sister         Social History  Social History     Socioeconomic History    Marital status: Single   Tobacco Use    Smoking status: Never     Passive exposure: Never   Substance and Sexual Activity    Alcohol use: Yes     Comment: 3 cocktails weekly    Drug use: Never    Sexual activity: Yes     Social Drivers of Health     Financial Resource Strain: Low Risk  (6/23/2024)    Overall Financial Resource Strain (CARDIA)     Difficulty of Paying Living Expenses: Not hard at all   Food Insecurity: No Food Insecurity (6/23/2024)    Hunger Vital Sign     Worried About Running Out of Food in the Last Year: Never true     Ran Out of Food in the Last Year: Never true   Transportation Needs: No Transportation Needs (8/28/2024)    TRANSPORTATION NEEDS     Transportation : No   Physical Activity: Inactive (6/23/2024)    Exercise Vital Sign     Days of Exercise per Week: 0 days     Minutes of Exercise per Session: 0 min   Stress: No Stress Concern Present (6/23/2024)    [employer] [place] [employer] - Occupational Stress Questionnaire     Feeling of Stress : Only a little   Housing Stability: Unknown (6/23/2024)    Housing Stability Vital Sign     Unable to Pay for Housing in the Last Year: No       Vital Signs  There were no vitals filed for this visit.    Physical Exam  Constitutional: Appears well-developed and  well-nourished. No distress.   HENT:   Head: Normocephalic.   Eyes: EOM are normal.   Pulmonary/Chest: Effort normal.   Neurological: Oriented to person, place, and time.   Psychiatric: Normal mood and affect.     Right Upper Extremity:  No abrasions, lacerations, wounds.  No swelling.  Patient has tenderness over the thumb MCP ulnar collateral ligament.  No tenderness over the radial collateral ligament.  No ecchymosis.  Full active flexion and extension at the thumb IP joint.  Patient has pain but no laxity with stress of the thumb ulnar collateral ligament at 0 and 30°.  No pain or laxity with stress of the thumb radial collateral ligament at 0 and 30°.  Sensation is intact in the median, radial, ulnar nerve distributions.  Palpable radial pulse.    Imaging  Right hand x-rays three views were obtained today and independently reviewed by myself.  No arthritic changes are noted throughout the carpus, MCP, and IP joints.  No fracture.  No dislocation or subluxation.  No foreign body.      Assessment and Plan  39-year-old right-hand dominant female presents with a right thumb ulnar collateral ligament sprain.  I discussed the natural history of this pathology with the patient.  I discussed that this can be treated nonoperatively.  I discussed options including having patient set of work for a couple of weeks to allow this to heal.  Patient was states she was unable to do this because she needs to get paid.  I discussed that that has fine but that this will likely ultimately prolong the amount of time it takes for her to heal.  I fitted patient for a Modabber with thumb stay brace today.  I discussed that she can try to wear this while doing her job as a thumb spica brace it was not an option for her.  Minimize use of the right-hand as much as possible.  Follow up in 4 weeks for re-evaluation.  Obtain right thumb x-rays at that visit    Marley Sue MD  Orthopaedic Hand Surgery

## 2024-12-30 ENCOUNTER — HOSPITAL ENCOUNTER (OUTPATIENT)
Dept: RADIOLOGY | Facility: HOSPITAL | Age: 39
Discharge: HOME | End: 2024-12-30
Attending: STUDENT IN AN ORGANIZED HEALTH CARE EDUCATION/TRAINING PROGRAM
Payer: OTHER GOVERNMENT

## 2024-12-30 ENCOUNTER — OFFICE VISIT (OUTPATIENT)
Dept: ORTHOPEDICS | Facility: CLINIC | Age: 39
End: 2024-12-30
Payer: OTHER GOVERNMENT

## 2024-12-30 VITALS — HEIGHT: 69 IN | WEIGHT: 209 LBS | BODY MASS INDEX: 30.96 KG/M2

## 2024-12-30 DIAGNOSIS — M79.641 RIGHT HAND PAIN: ICD-10-CM

## 2024-12-30 DIAGNOSIS — S63.641D SPRAIN OF ULNAR COLLATERAL LIGAMENT OF METACARPOPHALANGEAL (MCP) JOINT OF RIGHT THUMB, SUBSEQUENT ENCOUNTER: Primary | ICD-10-CM

## 2024-12-30 PROCEDURE — 99214 OFFICE O/P EST MOD 30 MIN: CPT | Mod: S$PBB,,, | Performed by: STUDENT IN AN ORGANIZED HEALTH CARE EDUCATION/TRAINING PROGRAM

## 2024-12-30 PROCEDURE — 99213 OFFICE O/P EST LOW 20 MIN: CPT | Mod: PBBFAC,25 | Performed by: STUDENT IN AN ORGANIZED HEALTH CARE EDUCATION/TRAINING PROGRAM

## 2024-12-30 PROCEDURE — 99999 PR PBB SHADOW E&M-EST. PATIENT-LVL III: CPT | Mod: PBBFAC,,, | Performed by: STUDENT IN AN ORGANIZED HEALTH CARE EDUCATION/TRAINING PROGRAM

## 2024-12-30 PROCEDURE — 73140 X-RAY EXAM OF FINGER(S): CPT | Mod: TC,RT

## 2024-12-30 PROCEDURE — 73140 X-RAY EXAM OF FINGER(S): CPT | Mod: 26,RT,, | Performed by: RADIOLOGY

## 2024-12-30 NOTE — PROGRESS NOTES
Hand Surgery Clinic Note    Chief Complaint  Chief Complaint   Patient presents with    Right Hand - Injury, Pain     thumb     History of Present Illness  39-year-old right-hand dominant female  returns today for follow up of right thumb MCP UCL injury that occurred in mid November 2024, approximately 6-7 weeks ago.  She has been treated with a thumb spica brace, transitioned to a Modabber thumb brace to allow her to try to return to work per patient request.  She reports that she has tolerated her work responsibilities well in spite of some mild soreness that has persisted as she has recovered.  She is overall pleased with her progress and understands that her recovery is going to take a bit more time than normal before she is pain free.  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  Pain level is a 2/10.    Review of Systems  Review of systems negative for chest pain, shortness of breath, fevers, chills, nausea/vomiting.    Past Medical History  Past Medical History:   Diagnosis Date    Mild intermittent asthma, uncomplicated     Sleep apnea        Past Surgical History  Past Surgical History:   Procedure Laterality Date    COLONOSCOPY N/A 7/17/2024    Procedure: COLONOSCOPY;  Surgeon: Chris Rosenthal MD;  Location: [facility];  Service: General;  Laterality: N/A;    FLEXIBLE SIGMOIDOSCOPY  8/27/2024    Procedure: SIGMOIDOSCOPY, FLEXIBLE;  Surgeon: Chris Rosenthal MD;  Location: [facility] OR;  Service: General;;    INJECTION OF ANESTHETIC AGENT INTO TISSUE PLANE DEFINED BY TRANSVERSUS ABDOMINIS MUSCLE N/A 8/27/2024    Procedure: BLOCK, TRANSVERSUS ABDOMINIS PLANE;  Surgeon: Chris Rosenthal MD;  Location: [facility] OR;  Service: General;  Laterality: N/A;    ROBOT-ASSISTED LAPAROSCOPIC RESECTION OF SIGMOID COLON USING DA TYRONE XI Left 8/27/2024    Procedure: XI ROBOTIC COLECTOMY, SIGMOID;  Surgeon: Chris Rosenthal MD;   Location: [facility] OR;  Service: General;  Laterality: Left;  mobilization of splenic flexure    TONSILLECTOMY Bilateral 4/5/2024    Procedure: TONSILLECTOMY;  Surgeon: Juan M Cevallos MD;  Location: [facility] OR;  Service: ENT;  Laterality: Bilateral;    WRAP-OMENTAL N/A 8/27/2024    Procedure: WRAP-OMENTAL;  Surgeon: Chris Rosenthal MD;  Location: [facility] OR;  Service: General;  Laterality: N/A;       Allergies  Review of patient's allergies indicates:  No Known Allergies    Family History  Family History   Problem Relation Name Age of Onset    Heart disease Father      Seizures Sister         Social History  Social History     Socioeconomic History    Marital status: Single   Tobacco Use    Smoking status: Never     Passive exposure: Never   Substance and Sexual Activity    Alcohol use: Yes     Comment: 3 cocktails weekly    Drug use: Never    Sexual activity: Yes     Social Drivers of Health     Financial Resource Strain: Low Risk  (6/23/2024)    Overall Financial Resource Strain (CARDIA)     Difficulty of Paying Living Expenses: Not hard at all   Food Insecurity: No Food Insecurity (6/23/2024)    Hunger Vital Sign     Worried About Running Out of Food in the Last Year: Never true     Ran Out of Food in the Last Year: Never true   Transportation Needs: No Transportation Needs (8/28/2024)    TRANSPORTATION NEEDS     Transportation : No   Physical Activity: Inactive (6/23/2024)    Exercise Vital Sign     Days of Exercise per Week: 0 days     Minutes of Exercise per Session: 0 min   Stress: No Stress Concern Present (6/23/2024)    [employer] [place] [employer] - Occupational Stress Questionnaire     Feeling of Stress : Only a little   Housing Stability: Unknown (6/23/2024)    Housing Stability Vital Sign     Unable to Pay for Housing in the Last Year: No       Vital Signs  There were no vitals filed for this visit.    Physical Exam  Constitutional: Appears well-developed and well-nourished. No distress.    HENT:   Head: Normocephalic.   Eyes: EOM are normal.   Pulmonary/Chest: Effort normal.   Neurological: Oriented to person, place, and time.   Psychiatric: Normal mood and affect.     Right Upper Extremity:  No swelling.  Minimal tenderness over the thumb MCP ulnar collateral ligament.  No tenderness over the radial collateral ligament.  No ecchymosis.  Full active flexion and extension at the thumb IP and MCP joints.  No laxity with stress of the thumb ulnar collateral ligament at 0 and 30°.  No pain or laxity with stress of the thumb radial collateral ligament at 0 and 30°.  Sensation is intact in the median, radial, ulnar nerve distributions.  Palpable radial pulse.    Imaging  Right thumb x-rays three views were obtained today and independently reviewed by myself.  No fracture. No dislocation or subluxation. No malalignment. No foreign body.    Assessment and Plan  39-year-old right-hand dominant female approx 6 weeks s/p right thumb ulnar collateral ligament sprain.  Doing great.  At this point she has returned to all activities.  She has been longer wearing this brace.  The ulnar collateral ligament is stable on exam today.  Patient states she is 90% better.  She has full motion.  She can continue with activities as tolerated.  No restrictions.  Discussed that this injury can sometimes take a few months to fully recover from.  Patient is aware of this.  Follow up in clinic as needed if symptoms recur or do not resolve.    Marley Sue MD  Orthopaedic Hand Surgery

## 2025-06-03 ENCOUNTER — HOSPITAL ENCOUNTER (EMERGENCY)
Facility: HOSPITAL | Age: 40
Discharge: HOME | End: 2025-06-03
Attending: EMERGENCY MEDICINE
Payer: OTHER GOVERNMENT

## 2025-06-03 VITALS
BODY MASS INDEX: 32.12 KG/M2 | HEIGHT: 69 IN | WEIGHT: 216.88 LBS | TEMPERATURE: 99 F | HEART RATE: 93 BPM | OXYGEN SATURATION: 100 % | SYSTOLIC BLOOD PRESSURE: 137 MMHG | RESPIRATION RATE: 16 BRPM | DIASTOLIC BLOOD PRESSURE: 77 MMHG

## 2025-06-03 DIAGNOSIS — N83.202 CYST OF LEFT OVARY: Primary | ICD-10-CM

## 2025-06-03 LAB
ABSOLUTE EOSINOPHIL (OHS): 0.12 K/UL
ABSOLUTE MONOCYTE (OHS): 0.46 K/UL (ref 0.3–1)
ABSOLUTE NEUTROPHIL COUNT (OHS): 3.3 K/UL (ref 1.8–7.7)
ALBUMIN SERPL BCP-MCNC: 4 G/DL (ref 3.5–5.2)
ALP SERPL-CCNC: 68 UNIT/L (ref 40–150)
ALT SERPL W/O P-5'-P-CCNC: 20 UNIT/L (ref 10–44)
ANION GAP (OHS): 12 MMOL/L (ref 8–16)
AST SERPL-CCNC: 17 UNIT/L (ref 11–45)
B-HCG UR QL: NEGATIVE
BASOPHILS # BLD AUTO: 0.03 K/UL
BASOPHILS NFR BLD AUTO: 0.5 %
BILIRUB SERPL-MCNC: 0.4 MG/DL (ref 0.1–1)
BILIRUB UR QL STRIP.AUTO: NEGATIVE
BUN SERPL-MCNC: 11 MG/DL (ref 6–20)
CALCIUM SERPL-MCNC: 9.5 MG/DL (ref 8.7–10.5)
CHLORIDE SERPL-SCNC: 105 MMOL/L (ref 95–110)
CLARITY UR: ABNORMAL
CO2 SERPL-SCNC: 20 MMOL/L (ref 23–29)
COLOR UR AUTO: YELLOW
CREAT SERPL-MCNC: 1 MG/DL (ref 0.5–1.4)
ERYTHROCYTE [DISTWIDTH] IN BLOOD BY AUTOMATED COUNT: 14 % (ref 11.5–14.5)
GFR SERPLBLD CREATININE-BSD FMLA CKD-EPI: >60 ML/MIN/1.73/M2
GLUCOSE SERPL-MCNC: 117 MG/DL (ref 70–110)
GLUCOSE UR QL STRIP: NEGATIVE
HCT VFR BLD AUTO: 39.7 % (ref 37–48.5)
HCV AB SERPL QL IA: NEGATIVE
HGB BLD-MCNC: 12.5 GM/DL (ref 12–16)
HGB UR QL STRIP: NEGATIVE
HIV 1+2 AB+HIV1 P24 AG SERPL QL IA: NEGATIVE
HOLD SPECIMEN: NORMAL
HOLD SPECIMEN: NORMAL
IMM GRANULOCYTES # BLD AUTO: 0.01 K/UL (ref 0–0.04)
IMM GRANULOCYTES NFR BLD AUTO: 0.2 % (ref 0–0.5)
KETONES UR QL STRIP: NEGATIVE
LEUKOCYTE ESTERASE UR QL STRIP: NEGATIVE
LYMPHOCYTES # BLD AUTO: 1.97 K/UL (ref 1–4.8)
MCH RBC QN AUTO: 25.3 PG (ref 27–31)
MCHC RBC AUTO-ENTMCNC: 31.5 G/DL (ref 32–36)
MCV RBC AUTO: 80 FL (ref 82–98)
NITRITE UR QL STRIP: NEGATIVE
NUCLEATED RBC (/100WBC) (OHS): 0 /100 WBC
PH UR STRIP: 6 [PH]
PLATELET # BLD AUTO: 411 K/UL (ref 150–450)
PMV BLD AUTO: 9.1 FL (ref 9.2–12.9)
POTASSIUM SERPL-SCNC: 3.6 MMOL/L (ref 3.5–5.1)
PROT SERPL-MCNC: 9 GM/DL (ref 6–8.4)
PROT UR QL STRIP: ABNORMAL
RBC # BLD AUTO: 4.94 M/UL (ref 4–5.4)
RELATIVE EOSINOPHIL (OHS): 2 %
RELATIVE LYMPHOCYTE (OHS): 33.4 % (ref 18–48)
RELATIVE MONOCYTE (OHS): 7.8 % (ref 4–15)
RELATIVE NEUTROPHIL (OHS): 56.1 % (ref 38–73)
SODIUM SERPL-SCNC: 137 MMOL/L (ref 136–145)
SP GR UR STRIP: >=1.03
UROBILINOGEN UR STRIP-ACNC: NEGATIVE EU/DL
WBC # BLD AUTO: 5.89 K/UL (ref 3.9–12.7)

## 2025-06-03 PROCEDURE — 81025 URINE PREGNANCY TEST: CPT | Performed by: REGISTERED NURSE

## 2025-06-03 PROCEDURE — 86803 HEPATITIS C AB TEST: CPT | Performed by: EMERGENCY MEDICINE

## 2025-06-03 PROCEDURE — 80053 COMPREHEN METABOLIC PANEL: CPT | Performed by: REGISTERED NURSE

## 2025-06-03 PROCEDURE — 81003 URINALYSIS AUTO W/O SCOPE: CPT | Performed by: REGISTERED NURSE

## 2025-06-03 PROCEDURE — 85025 COMPLETE CBC W/AUTO DIFF WBC: CPT | Performed by: REGISTERED NURSE

## 2025-06-03 PROCEDURE — 99284 EMERGENCY DEPT VISIT MOD MDM: CPT | Mod: 25

## 2025-06-03 PROCEDURE — 87389 HIV-1 AG W/HIV-1&-2 AB AG IA: CPT | Performed by: EMERGENCY MEDICINE

## 2025-06-03 RX ORDER — HYDROCODONE BITARTRATE AND ACETAMINOPHEN 5; 325 MG/1; MG/1
1 TABLET ORAL EVERY 6 HOURS PRN
Qty: 12 TABLET | Refills: 0 | Status: SHIPPED | OUTPATIENT
Start: 2025-06-03

## 2025-06-03 NOTE — ED PROVIDER NOTES
Encounter Date: 6/3/2025       History     Chief Complaint   Patient presents with    Abdominal Pain     Pt reports lower ABD,pubic and right flank pain x two weeks. Pt reports painful intercourse x 2 days ago. Pain rating 7/10     39-year-old female presents emergency department complaints of pelvic pain.  Patient states she has intermittent pelvic pain for the last 2 weeks.  She reports 1 episode of painful intercourse 2 days ago.  She denies fever, chills, dysuria, vaginal discharge, vomiting, diarrhea or any other symptoms.    The history is provided by the patient.     Review of patient's allergies indicates:  No Known Allergies  Past Medical History:   Diagnosis Date    Mild intermittent asthma, uncomplicated     Sleep apnea      Past Surgical History:   Procedure Laterality Date    COLONOSCOPY N/A 7/17/2024    Procedure: COLONOSCOPY;  Surgeon: Chris Rosenthal MD;  Location: [facility] ENDO;  Service: General;  Laterality: N/A;    FLEXIBLE SIGMOIDOSCOPY  8/27/2024    Procedure: SIGMOIDOSCOPY, FLEXIBLE;  Surgeon: Chris Rosenthal MD;  Location: [facility] OR;  Service: General;;    INJECTION OF ANESTHETIC AGENT INTO TISSUE PLANE DEFINED BY TRANSVERSUS ABDOMINIS MUSCLE N/A 8/27/2024    Procedure: BLOCK, TRANSVERSUS ABDOMINIS PLANE;  Surgeon: Chris Rosenthal MD;  Location: [facility] OR;  Service: General;  Laterality: N/A;    ROBOT-ASSISTED LAPAROSCOPIC RESECTION OF SIGMOID COLON USING DA TYRONE XI Left 8/27/2024    Procedure: XI ROBOTIC COLECTOMY, SIGMOID;  Surgeon: Chris Rosenthal MD;  Location: [facility] OR;  Service: General;  Laterality: Left;  mobilization of splenic flexure    TONSILLECTOMY Bilateral 4/5/2024    Procedure: TONSILLECTOMY;  Surgeon: Juan M Cevallos MD;  Location: [facility] OR;  Service: ENT;  Laterality: Bilateral;    WRAP-OMENTAL N/A 8/27/2024    Procedure: WRAP-OMENTAL;  Surgeon: Chris Rosenthal MD;  Location: [facility] OR;  Service: General;  Laterality: N/A;     Family History   Problem Relation  Name Age of Onset    Heart disease Father      Seizures Sister       Social History[1]  Review of Systems   Constitutional:  Negative for fever.   HENT:  Negative for sore throat.    Respiratory:  Negative for shortness of breath.    Cardiovascular:  Negative for chest pain.   Gastrointestinal:  Negative for nausea.   Genitourinary:  Positive for pelvic pain. Negative for dysuria.   Musculoskeletal:  Positive for back pain.   Skin:  Negative for rash.   Neurological:  Negative for weakness.   Hematological:  Does not bruise/bleed easily.   All other systems reviewed and are negative.      Physical Exam     Initial Vitals   BP Pulse Resp Temp SpO2   06/03/25 1406 06/03/25 1406 06/03/25 1406 06/03/25 1407 06/03/25 1406   132/69 110 18 99 °F (37.2 °C) 98 %      MAP       --                Physical Exam    Constitutional: She appears well-developed and well-nourished. She is not diaphoretic. No distress.   HENT:   Head: Normocephalic and atraumatic.   Eyes: Conjunctivae and EOM are normal. Pupils are equal, round, and reactive to light.   Neck: Neck supple.   Normal range of motion.  Cardiovascular:  Normal rate, regular rhythm and normal heart sounds.           No murmur heard.  Pulmonary/Chest: Breath sounds normal. No respiratory distress. She has no wheezes. She has no rales.   Abdominal: Abdomen is soft. Bowel sounds are normal. There is abdominal tenderness.     There is no rebound and no guarding.   Musculoskeletal:         General: No tenderness or edema. Normal range of motion.      Cervical back: Normal range of motion and neck supple.     Neurological: She is alert and oriented to person, place, and time. No cranial nerve deficit. GCS score is 15. GCS eye subscore is 4. GCS verbal subscore is 5. GCS motor subscore is 6.   Skin: Skin is warm and dry. Capillary refill takes less than 2 seconds.   Psychiatric: She has a normal mood and affect. Thought content normal.         ED Course   Procedures  Labs Reviewed    URINALYSIS, REFLEX TO URINE CULTURE - Abnormal       Result Value    Color, UA Yellow      Appearance, UA Hazy (*)     pH, UA 6.0      Spec Grav UA >=1.030 (*)     Protein, UA Trace (*)     Glucose, UA Negative      Ketones, UA Negative      Bilirubin, UA Negative      Blood, UA Negative      Nitrites, UA Negative      Urobilinogen, UA Negative      Leukocyte Esterase, UA Negative     COMPREHENSIVE METABOLIC PANEL - Abnormal    Sodium 137      Potassium 3.6      Chloride 105      CO2 20 (*)     Glucose 117 (*)     BUN 11      Creatinine 1.0      Calcium 9.5      Protein Total 9.0 (*)     Albumin 4.0      Bilirubin Total 0.4      ALP 68      AST 17      ALT 20      Anion Gap 12      eGFR >60     CBC WITH DIFFERENTIAL - Abnormal    WBC 5.89      RBC 4.94      HGB 12.5      HCT 39.7      MCV 80 (*)     MCH 25.3 (*)     MCHC 31.5 (*)     RDW 14.0      Platelet Count 411      MPV 9.1 (*)     Nucleated RBC 0      Neut % 56.1      Lymph % 33.4      Mono % 7.8      Eos % 2.0      Basophil % 0.5      Imm Grans % 0.2      Neut # 3.30      Lymph # 1.97      Mono # 0.46      Eos # 0.12      Baso # 0.03      Imm Grans # 0.01     HEPATITIS C ANTIBODY - Normal    Hep C Ab Interp Negative     HIV 1 / 2 ANTIBODY - Normal    HIV 1/2 Ag/Ab Negative     PREGNANCY TEST, URINE RAPID - Normal    hCG Qualitative, Urine Negative     HEP C VIRUS HOLD SPECIMEN    Extra Tube Hold for add-ons.     CBC W/ AUTO DIFFERENTIAL    Narrative:     The following orders were created for panel order CBC auto differential.  Procedure                               Abnormality         Status                     ---------                               -----------         ------                     CBC with Differential[[id number]]       Abnormal            Final result                 Please view results for these tests on the individual orders.   GREY TOP URINE HOLD    Extra Tube Hold for add-ons.            Imaging Results              US Transvaginal Non  OB (Final result)  Result time 06/03/25 18:07:11   Procedure changed from US Pelvis Complete Non OB     Final result by Brandon Sanon MD (06/03/25 18:07:11)                   Impression:     Normal and transvaginal pelvic ultrasound    Finalized on: 6/3/2025 6:07 PM By:  Brandon Sanon MD  [facility]# [id number]      2025-06-03 18:09:20.061     [facility]               Narrative:    EXAM: US TRANSVAGINAL NON OB    CLINICAL HISTORY:  Pelvic pain.    FINDINGS:  Transvaginal pelvic ultrasound performed.  Normal size retroflexed uterus, 7.2 x 4.6 x 5 cm, with normal homogeneous echogenicity.  Normal endometrium, 4 mm.    Normal right ovary, 3.3 x 1.7 x 2.3 cm, with a anechoic follicle, 1.7 cm.  Normal left ovary, 3.4 x 1.2 x 2.4 cm, with a small follicle.    Normal ovarian color Doppler flow and waveforms.    There is no adnexal mass.    There is no pelvic free fluid.                                           Medications - No data to display  Medical Decision Making  Differential diagnosis  Pregnancy, ectopic pregnancy, cyst    Amount and/or Complexity of Data Reviewed  Labs: ordered.  Radiology: ordered.  Discussion of management or test interpretation with external provider(s): I discussed with patient that the evaluation in the emergency department does not suggest any emergent or life threatening medical condition requiring immediate intervention beyond what was provided in the ED, and I believe patient is safe for discharge.  Regardless, an unremarkable evaluation in the ED does not preclude the development or presence of a serious of life threatening condition. As such, patient was instructed to return immediately for any worsening or change in current symptoms. I also discussed the results of my evaluation and diagnosis with patient and she concurs with the evaluation and management plan.  Detailed written and verbal instructions provided to patient and she expressed a verbal understanding of the discharge instructions and  overall management plan. Reiterated the importance of medication administration and safety and advised patient to follow up with primary care provider in 3-5 days or sooner if needed.  Also advised patient to return to the ER for any complications.       Risk  Prescription drug management.                                      Clinical Impression:  Final diagnoses:  [N83.202] Cyst of left ovary (Primary)          ED Disposition Condition    Discharge Stable          ED Prescriptions       Medication Sig Dispense Start Date End Date Auth. Provider    HYDROcodone-acetaminophen (NORCO) 5-325 mg per tablet Take 1 tablet by mouth every 6 (six) hours as needed for Pain. 12 tablet 6/3/2025 -- Silvano Wharton NP          Follow-up Information       Follow up With Specialties Details Why Contact Info Additional Information    [facility] Gynecology  As needed [address] [place] [address] [phone] Please take [address] for the Medical Office Building. Check in on the 3rd floor, to the right.    [facility] Emergency Dept. Emergency Medicine  If symptoms worsen [id number] [address] [phone]                    [1]   Social History  Tobacco Use    Smoking status: Never     Passive exposure: Never   Substance Use Topics    Alcohol use: Yes     Comment: 3 cocktails weekly    Drug use: Never        Silvano Wharton NP  06/03/25 [id number]

## 2025-08-20 ENCOUNTER — OFFICE VISIT (OUTPATIENT)
Dept: CARDIOLOGY | Facility: CLINIC | Age: 40
End: 2025-08-20

## 2025-08-20 VITALS
HEART RATE: 79 BPM | OXYGEN SATURATION: 98 % | DIASTOLIC BLOOD PRESSURE: 70 MMHG | WEIGHT: 212.63 LBS | BODY MASS INDEX: 31.4 KG/M2 | SYSTOLIC BLOOD PRESSURE: 108 MMHG

## 2025-08-20 DIAGNOSIS — N39.498 OTHER URINARY INCONTINENCE: Primary | ICD-10-CM

## 2025-08-20 DIAGNOSIS — G47.33 OBSTRUCTIVE SLEEP APNEA ON CPAP: ICD-10-CM

## 2025-08-20 PROCEDURE — 99214 OFFICE O/P EST MOD 30 MIN: CPT | Mod: S$PBB,,, | Performed by: INTERNAL MEDICINE

## 2025-08-20 PROCEDURE — 99214 OFFICE O/P EST MOD 30 MIN: CPT | Mod: PBBFAC | Performed by: INTERNAL MEDICINE

## 2025-08-20 PROCEDURE — 99999 PR PBB SHADOW E&M-EST. PATIENT-LVL IV: CPT | Mod: PBBFAC,,, | Performed by: INTERNAL MEDICINE

## 2025-08-20 RX ORDER — METRONIDAZOLE 500 MG/1
500 TABLET ORAL EVERY 8 HOURS
COMMUNITY
Start: 2025-08-14

## 2025-08-20 RX ORDER — CLINDAMYCIN PHOSPHATE 10 UG/ML
LOTION TOPICAL 2 TIMES DAILY
COMMUNITY
Start: 2025-08-11

## 2025-08-20 RX ORDER — ERGOCALCIFEROL 1.25 MG/1
50000 CAPSULE ORAL
COMMUNITY
Start: 2025-05-02

## 2025-08-28 ENCOUNTER — TELEPHONE (OUTPATIENT)
Dept: HEPATOLOGY | Facility: CLINIC | Age: 40
End: 2025-08-28
Payer: OTHER GOVERNMENT

## 2025-09-02 ENCOUNTER — HOSPITAL ENCOUNTER (EMERGENCY)
Facility: HOSPITAL | Age: 40
Discharge: HOME | End: 2025-09-02
Payer: OTHER GOVERNMENT

## 2025-09-02 VITALS
BODY MASS INDEX: 31.16 KG/M2 | WEIGHT: 211 LBS | HEART RATE: 77 BPM | TEMPERATURE: 99 F | RESPIRATION RATE: 18 BRPM | DIASTOLIC BLOOD PRESSURE: 60 MMHG | SYSTOLIC BLOOD PRESSURE: 123 MMHG | OXYGEN SATURATION: 99 %

## 2025-09-02 DIAGNOSIS — S76.011A HIP STRAIN, RIGHT, INITIAL ENCOUNTER: Primary | ICD-10-CM

## 2025-09-02 DIAGNOSIS — M54.31 SCIATICA, RIGHT SIDE: ICD-10-CM

## 2025-09-02 LAB — B-HCG UR QL: NEGATIVE

## 2025-09-02 PROCEDURE — 99284 EMERGENCY DEPT VISIT MOD MDM: CPT | Mod: 25

## 2025-09-02 PROCEDURE — 96372 THER/PROPH/DIAG INJ SC/IM: CPT

## 2025-09-02 PROCEDURE — 63600175 PHARM REV CODE 636 W HCPCS: Mod: JZ,TB

## 2025-09-02 PROCEDURE — 81025 URINE PREGNANCY TEST: CPT | Performed by: NURSE PRACTITIONER

## 2025-09-02 RX ORDER — KETOROLAC TROMETHAMINE 30 MG/ML
30 INJECTION, SOLUTION INTRAMUSCULAR; INTRAVENOUS
Status: COMPLETED | OUTPATIENT
Start: 2025-09-02 | End: 2025-09-02

## 2025-09-02 RX ORDER — METHOCARBAMOL 500 MG/1
500 TABLET, FILM COATED ORAL 3 TIMES DAILY
Qty: 15 TABLET | Refills: 0 | Status: SHIPPED | OUTPATIENT
Start: 2025-09-02 | End: 2025-09-07

## 2025-09-02 RX ORDER — DICLOFENAC SODIUM 75 MG/1
75 TABLET, DELAYED RELEASE ORAL 2 TIMES DAILY
Qty: 10 TABLET | Refills: 0 | Status: SHIPPED | OUTPATIENT
Start: 2025-09-02 | End: 2025-09-07

## 2025-09-02 RX ADMIN — KETOROLAC TROMETHAMINE 30 MG: 30 INJECTION, SOLUTION INTRAMUSCULAR at 10:09

## (undated) DEVICE — SET TUBE DAVINCI 5 HI FLOW INS

## (undated) DEVICE — DRAPE THREE-QUARTER 53X77IN

## (undated) DEVICE — CANNULA REDUCER 12-8MM

## (undated) DEVICE — COVER PROXIMA MAYO STAND

## (undated) DEVICE — TROCAR ENDOPATH XCEL 8MM 10CM

## (undated) DEVICE — SET PNEUMOCLEAR HEAT HUM SE HF

## (undated) DEVICE — SUT MCRYL PLUS 4-0 PS2 27IN

## (undated) DEVICE — SUT VICRYL PLUS 3-0 SH 18IN

## (undated) DEVICE — ADHESIVE DERMABOND ADVANCED

## (undated) DEVICE — SYR LUER-LOCK STERILE 3ML

## (undated) DEVICE — COVER CAMERA OPERATING ROOM

## (undated) DEVICE — ELECTRODE BLADE INSULATED 1 IN

## (undated) DEVICE — NDL SAFETY 21G X 1 1/2 ECLPSE

## (undated) DEVICE — SEAL CANN UNIVERSAL 5-12MM

## (undated) DEVICE — SCISSOR 5MMX35CM DIRECT DRIVE

## (undated) DEVICE — TIP YANKAUERS BULB NO VENT

## (undated) DEVICE — GLOVE SIGNATURE ESSNTL LTX 8

## (undated) DEVICE — GLOVE SENSICARE PI GRN 8

## (undated) DEVICE — RELOAD SUREFORM 60 3.5 BLU 6R

## (undated) DEVICE — TOWEL OR DISP STRL BLUE 4/PK

## (undated) DEVICE — DRAPE STERI LONG

## (undated) DEVICE — GOWN POLY REINF BRTH SLV XL

## (undated) DEVICE — IRRIGATION SET Y-TYPE TUR/BLAD

## (undated) DEVICE — SOL 9P NACL IRR PIC IL

## (undated) DEVICE — DRAPE CORETEMP FLD WRM 56X62IN

## (undated) DEVICE — KIT ANTIFOG

## (undated) DEVICE — DEVICE CLOSURE DISP 14G

## (undated) DEVICE — ELECTRODE REM PLYHSV RETURN 9

## (undated) DEVICE — SYR 30CC LUER LOCK

## (undated) DEVICE — CATH MALECOT 30FR 6/BX

## (undated) DEVICE — TRAY CATH 1-LYR URIMTR 16FR

## (undated) DEVICE — SUT PROLENE 2-0 30 SH

## (undated) DEVICE — CATH URETHRAL 12FR

## (undated) DEVICE — SPONGE COTTON TRAY 4X4IN

## (undated) DEVICE — APPLICATOR CHLORAPREP ORN 26ML

## (undated) DEVICE — SUT CTD VICRYL 0 UND BR SUT

## (undated) DEVICE — SEALER VESSEL EXTEND

## (undated) DEVICE — KIT ANTIFOG W/SPONG & FLUID

## (undated) DEVICE — NDL ECLIPSE SAFETY 23G 1.5IN

## (undated) DEVICE — PAD PINK TRENDELENBURG POS XL

## (undated) DEVICE — MANIFOLD 4 PORT

## (undated) DEVICE — PACK SPY-PHI DRUG DRAPE

## (undated) DEVICE — TIP SUCTION YANKAUER

## (undated) DEVICE — SUT SILK 0 SUTUPAK SA86H

## (undated) DEVICE — DRAPE LAPSCP CHOLE 122X102X78

## (undated) DEVICE — BAG INZII TISS RETRV 12/15MM

## (undated) DEVICE — COVER TIP CURVED SCISSORS XI

## (undated) DEVICE — SUT VICRYL 0 27 CT-2

## (undated) DEVICE — SUT MONOCRYL 4.0 PS2 CP496G

## (undated) DEVICE — IRRIGATOR ENDOSCOPY DISP.

## (undated) DEVICE — DRAPE COLUMN DAVINCI XI

## (undated) DEVICE — GLOVE SIGNATURE ESSNTL LTX 6

## (undated) DEVICE — CONTAINER SPECIMEN OR STER 4OZ

## (undated) DEVICE — PACK SURG LITHOTOMY 3 SIRUS

## (undated) DEVICE — DRAPE THREE-QTR REINF 53X77IN

## (undated) DEVICE — DRAPE ARM DAVINCI XI

## (undated) DEVICE — SYR IRRIGATION BULB STER 60ML

## (undated) DEVICE — SOL NORMAL USPCA 0.9%

## (undated) DEVICE — SUPPORT ULNA NERVE PROTECTOR

## (undated) DEVICE — COVER LIGHT HANDLE 80/CA

## (undated) DEVICE — NDL PNEUMO INSUFFLATI 120MM

## (undated) DEVICE — KIT SUCTION CATH 10FR

## (undated) DEVICE — GLOVE SENSICARE PI GRN 6.5

## (undated) DEVICE — SUCTION COAGULATOR 10FR 6IN

## (undated) DEVICE — TUBING SUCTION STRAIGHT .25X20

## (undated) DEVICE — CANNULA SEAL 12MM

## (undated) DEVICE — STAPLER ECHELON PWR CIR 29MM

## (undated) DEVICE — STAPLER SUREFORM 60 SPU

## (undated) DEVICE — PACK BASIC SETUP SC BR

## (undated) DEVICE — GLOVE SURGICAL LATEX SZ 8

## (undated) DEVICE — TUBING MEDI-VAC 20FT .25IN

## (undated) DEVICE — TIP GRASPER FENESTRATED DISP

## (undated) DEVICE — KIT TURNOVER

## (undated) DEVICE — PENCIL ELECTROCAUTERY W/ HLSTR

## (undated) DEVICE — SOL NS 1000CC